# Patient Record
Sex: FEMALE | Race: WHITE | Employment: OTHER | URBAN - METROPOLITAN AREA
[De-identification: names, ages, dates, MRNs, and addresses within clinical notes are randomized per-mention and may not be internally consistent; named-entity substitution may affect disease eponyms.]

---

## 2017-01-23 DIAGNOSIS — F32.0 MAJOR DEPRESSIVE DISORDER, SINGLE EPISODE, MILD (H): Primary | ICD-10-CM

## 2017-01-23 DIAGNOSIS — F32.0 MAJOR DEPRESSIVE DISORDER, SINGLE EPISODE, MILD (H): ICD-10-CM

## 2017-01-23 NOTE — TELEPHONE ENCOUNTER
Fluphenazine    Last Written Prescription Date: 12/22/16  Last Fill Quantity: 15,  # refills: 0   Last Office Visit with [facility], [facility] or [facility] prescribing provider: 01/28/16

## 2017-01-24 RX ORDER — FLUPHENAZINE HYDROCHLORIDE 1 MG/1
0.5 TABLET ORAL DAILY
Qty: 15 TABLET | Refills: 0 | Status: ON HOLD | OUTPATIENT
Start: 2017-01-24 | End: 2019-12-31

## 2017-02-15 ENCOUNTER — ALLIED HEALTH/NURSE VISIT (OUTPATIENT)
Dept: CARDIOLOGY | Facility: CLINIC | Age: 82
End: 2017-02-15
Attending: INTERNAL MEDICINE
Payer: MEDICARE

## 2017-02-15 DIAGNOSIS — I44.2 CHB (COMPLETE HEART BLOCK) (H): Primary | ICD-10-CM

## 2017-02-15 PROCEDURE — 93294 REM INTERROG EVL PM/LDLS PM: CPT | Performed by: INTERNAL MEDICINE

## 2017-02-15 PROCEDURE — 93296 REM INTERROG EVL PM/IDS: CPT | Mod: ZF

## 2017-02-15 NOTE — MR AVS SNAPSHOT
"              After Visit Summary   2/15/2017    Michaela Soria    MRN: [id number]           Patient Information     Date Of Birth          1927        Visit Information        Provider Department      2/15/2017 6:00 AM  ICD [facility]        Today's Diagnoses     CHB (complete heart block) (H)    -  1       Follow-ups after your visit        Who to contact     If you have questions or need follow up information about today's clinic visit or your schedule please contact [facility] directly at [phone].  Normal or non-critical lab and imaging results will be communicated to you by CircleUphart, letter or phone within 4 business days after the clinic has received the results. If you do not hear from us within 7 days, please contact the clinic through CircleUphart or phone. If you have a critical or abnormal lab result, we will notify you by phone as soon as possible.  Submit refill requests through Fiberstar or call your pharmacy and they will forward the refill request to us. Please allow 3 business days for your refill to be completed.          Additional Information About Your Visit        MyChart Information     Fiberstar lets you send messages to your doctor, view your test results, renew your prescriptions, schedule appointments and more. To sign up, go to [email or web address] . Click on \"Log in\" on the left side of the screen, which will take you to the Welcome page. Then click on \"Sign up Now\" on the right side of the page.     You will be asked to enter the access code listed below, as well as some personal information. Please follow the directions to create your username and password.     Your access code is: [email or web address]  Expires: 2017  3:23 PM     Your access code will  in 90 days. If you need help or a new code, please call your [place] clinic or [phone].        Care EveryWhere ID     This is your Care EveryWhere ID. This could be used by other organizations to " access your [facility] [employer] medical records  [id number]         Blood Pressure from Last 3 Encounters:   09/16/16 99/60   03/08/16 142/72   02/09/16 100/47    Weight from Last 3 Encounters:   09/16/16 63.3 kg (139 lb 8 oz)   03/08/16 61.7 kg (136 lb)   01/28/16 61.7 kg (136 lb)              We Performed the Following     INTERROGATION DEVICE EVAL REMOTE, PACER/ICD        Primary Care Provider Office Phone # Fax #    Lulufilipekiah Read [phone]       [facility] [address] [address]        Thank you!     Thank you for choosing [facility]  for your care. Our goal is always to provide you with excellent care. Hearing back from our patients is one way we can continue to improve our services. Please take a few minutes to complete the written survey that you may receive in the mail after your visit with us. Thank you!             Your Updated Medication List - Protect others around you: Learn how to safely use, store and throw away your medicines at www.disposemymeds.org.          This list is accurate as of: 2/15/17 11:59 PM.  Always use your most recent med list.                   Brand Name Dispense Instructions for use    acetaminophen 650 MG 8 hour tablet     100 tablet    Take 650 mg by mouth every 4 hours as needed for mild pain       ampicillin 500 MG capsule    PRINCIPEN    21 capsule    Take 1 capsule (500 mg) by mouth 3 times daily       atenolol 25 MG tablet    TENORMIN    90 tablet    Take 1 tablet (25 mg) by mouth daily       atorvastatin 20 MG tablet    LIPITOR    90 tablet    Take 1 tablet (20 mg) by mouth daily (Needs fasting lab work)       benzonatate 200 MG capsule    TESSALON    21 capsule    Take 1 capsule (200 mg) by mouth 3 times daily as needed for cough       blood glucose monitoring test strip    no brand specified    3 Month    1 strip by In Vitro route 3 times daily       calcium carbonate 500 MG chewable tablet    TUMS     Take 1  chew tab by mouth 4 times daily as needed for heartburn       chlordiazePOXIDE 10 MG capsule    LIBRIUM    90 capsule    Take 1 capsule (10 mg) by mouth 3 times daily Needs to see PCP for further refills       docusate calcium 240 MG capsule    STOOL SOFTENER    90 capsule    Take 1 capsule (240 mg) by mouth daily IN AM       fluorometholone 0.1 % ophthalmic susp    FML LIQUIFILM     Place 1 drop into the right eye 3 times daily       fluPHENAZine 1 MG tablet    PROLIXIN    15 tablet    Take 0.5 tablets (0.5 mg) by mouth daily (Needs follow-up appointment for this medication)       furosemide 40 MG tablet    LASIX    90 tablet    Take 1 tablet (40 mg) by mouth daily       glipiZIDE 10 MG 24 hr tablet    GLUCOTROL XL    90 tablet    Take 1 tablet (10 mg) by mouth daily (Needs fasting lab work)       hydrocortisone 2.5 % cream    ANUSOL-HC     Place rectally 4 times daily as needed for hemorrhoids       lisinopril 10 MG tablet    PRINIVIL/ZESTRIL    90 tablet    Take 1 tablet (10 mg) by mouth daily       metFORMIN 500 MG 24 hr tablet    GLUCOPHAGE-XR    180 tablet    Take 1 tablet (500 mg) by mouth 2 times daily (with meals) (Needs fasting lab work)       polyethylene glycol 0.4%- propylene glycol 0.3% 0.4-0.3 % Soln ophthalmic solution    SYSTANE ULTRA     Place 1 drop into the right eye every 3 hours       potassium chloride 10 MEQ tablet    K-TAB,KLOR-CON    90 tablet    Take 1 tablet (10 mEq) by mouth daily       tamsulosin 0.4 MG capsule    FLOMAX    30 capsule    Take 1 capsule (0.4 mg) by mouth At Bedtime       timolol 0.5 % ophthalmic solution    TIMOPTIC    3 Bottle    Place 1 drop into both eyes daily       TUCKS 50 % Pads      Externally apply 1 pad topically 6 times daily

## 2017-02-16 NOTE — PROGRESS NOTES
Remote pacemaker transmission received and reviewed.  Device transmission sent per MD orders.  Patient has a Medtronic dual lead pacemaker.  Normal pacemaker function.  No arrythmias recorded.  Presenting EGM = AS- @ 68 bpm.  AP = 28.8%.   = 93.6%. Estimated battery longevity to ANGELLA = 6 years.  Patient's daughter notified of interrogation results.  Patient's daughter reports that her mother is feeling well and denies specific complaints.  Plan for patient to send a remote transmission in 3 months as scheduled.    Remote pacemaker transmission

## 2017-06-26 ENCOUNTER — ALLIED HEALTH/NURSE VISIT (OUTPATIENT)
Dept: CARDIOLOGY | Facility: CLINIC | Age: 82
End: 2017-06-26
Attending: INTERNAL MEDICINE
Payer: MEDICARE

## 2017-06-26 DIAGNOSIS — I44.2 CHB (COMPLETE HEART BLOCK) (H): Primary | ICD-10-CM

## 2017-06-26 PROCEDURE — 93295 DEV INTERROG REMOTE 1/2/MLT: CPT | Performed by: INTERNAL MEDICINE

## 2017-06-26 PROCEDURE — 93280 PM DEVICE PROGR EVAL DUAL: CPT | Mod: ZF

## 2017-06-26 PROCEDURE — 93296 REM INTERROG EVL PM/IDS: CPT | Mod: ZF

## 2017-06-26 NOTE — MR AVS SNAPSHOT
"              After Visit Summary   2017    Michaela Soria    MRN: [id number]           Patient Information     Date Of Birth          1927        Visit Information        Provider Department      2017 6:00 AM  ICD [facility]        Today's Diagnoses     CHB (complete heart block) (H)    -  1       Follow-ups after your visit        Who to contact     If you have questions or need follow up information about today's clinic visit or your schedule please contact [facility] directly at [phone].  Normal or non-critical lab and imaging results will be communicated to you by BioPolyhart, letter or phone within 4 business days after the clinic has received the results. If you do not hear from us within 7 days, please contact the clinic through BioPolyhart or phone. If you have a critical or abnormal lab result, we will notify you by phone as soon as possible.  Submit refill requests through Runtastic or call your pharmacy and they will forward the refill request to us. Please allow 3 business days for your refill to be completed.          Additional Information About Your Visit        MyChart Information     Runtastic lets you send messages to your doctor, view your test results, renew your prescriptions, schedule appointments and more. To sign up, go to [email or web address] . Click on \"Log in\" on the left side of the screen, which will take you to the Welcome page. Then click on \"Sign up Now\" on the right side of the page.     You will be asked to enter the access code listed below, as well as some personal information. Please follow the directions to create your username and password.     Your access code is: [email or web address]  Expires: 2017  9:59 AM     Your access code will  in 90 days. If you need help or a new code, please call your [place] clinic or [phone].        Care EveryWhere ID     This is your Care EveryWhere ID. This could be used by other organizations to " access your [facility] medical records  [id number]         Blood Pressure from Last 3 Encounters:   09/16/16 99/60   03/08/16 142/72   02/09/16 100/47    Weight from Last 3 Encounters:   09/16/16 63.3 kg (139 lb 8 oz)   03/08/16 61.7 kg (136 lb)   01/28/16 61.7 kg (136 lb)              We Performed the Following     INTERROGATION DEVICE EVAL REMOTE, PACER/ICD     PM DEVICE PROGRAMMING EVAL, DUAL LEAD PACER        Primary Care Provider Office Phone # Fax #    Do Josh Read [phone]       [facility] [address] [address]        Equal Access to Services     AMANDEEP ORTIZ : Hadii aad ku hadasho Soomaali, waaxda luqadaha, qaybta kaalmada adeegyada, waxay pilar lisa. So [facility] [phone].    ATENCIÓN: Si habla español, tiene a bingham disposición servicios gratuitos de asistencia lingüística. [facility] [phone].    We comply with applicable federal civil rights laws and [place] laws. We do not discriminate on the basis of race, color, national origin, age, disability sex, sexual orientation or gender identity.            Thank you!     Thank you for choosing [facility]  for your care. Our goal is always to provide you with excellent care. Hearing back from our patients is one way we can continue to improve our services. Please take a few minutes to complete the written survey that you may receive in the mail after your visit with us. Thank you!             Your Updated Medication List - Protect others around you: Learn how to safely use, store and throw away your medicines at www.disposemymeds.org.          This list is accurate as of: 6/26/17 11:59 PM.  Always use your most recent med list.                   Brand Name Dispense Instructions for use Diagnosis    acetaminophen 650 MG 8 hour tablet     100 tablet    Take 650 mg by mouth every 4 hours as needed for mild pain    Pneumonia of right lower lobe due to infectious  organism (H)       ampicillin 500 MG capsule    PRINCIPEN    21 capsule    Take 1 capsule (500 mg) by mouth 3 times daily    Personal history of urinary tract infection       atenolol 25 MG tablet    TENORMIN    90 tablet    Take 1 tablet (25 mg) by mouth daily    Essential hypertension, benign       atorvastatin 20 MG tablet    LIPITOR    90 tablet    Take 1 tablet (20 mg) by mouth daily (Needs fasting lab work)    Hyperlipidemia LDL goal <100       benzonatate 200 MG capsule    TESSALON    21 capsule    Take 1 capsule (200 mg) by mouth 3 times daily as needed for cough    Pneumonia, organism unspecified, unspecified laterality, unspecified part of lung       blood glucose monitoring test strip    no brand specified    3 Month    1 strip by In Vitro route 3 times daily    Type 2 diabetes, HbA1C goal < 8% (H)       calcium carbonate 500 MG chewable tablet    TUMS     Take 1 chew tab by mouth 4 times daily as needed for heartburn        chlordiazePOXIDE 10 MG capsule    LIBRIUM    90 capsule    Take 1 capsule (10 mg) by mouth 3 times daily Needs to see PCP for further refills    NISHI (generalized anxiety disorder)       docusate calcium 240 MG capsule    STOOL SOFTENER    90 capsule    Take 1 capsule (240 mg) by mouth daily IN AM    Congestive heart failure, unspecified, AV block, 2nd degree       fluorometholone 0.1 % ophthalmic susp    FML LIQUIFILM     Place 1 drop into the right eye 3 times daily        fluPHENAZine 1 MG tablet    PROLIXIN    15 tablet    Take 0.5 tablets (0.5 mg) by mouth daily (Needs follow-up appointment for this medication)    Major depressive disorder, single episode, mild (H), Major depressive disorder, single episode, mild (H)       furosemide 40 MG tablet    LASIX    90 tablet    Take 1 tablet (40 mg) by mouth daily    Essential hypertension, benign       glipiZIDE 10 MG 24 hr tablet    GLUCOTROL XL    90 tablet    Take 1 tablet (10 mg) by mouth daily (Needs fasting lab work)    Type 2  diabetes mellitus without complication (H)       hydrocortisone 2.5 % cream    ANUSOL-HC     Place rectally 4 times daily as needed for hemorrhoids        lisinopril 10 MG tablet    PRINIVIL/ZESTRIL    90 tablet    Take 1 tablet (10 mg) by mouth daily    Essential hypertension, benign       metFORMIN 500 MG 24 hr tablet    GLUCOPHAGE-XR    180 tablet    Take 1 tablet (500 mg) by mouth 2 times daily (with meals) (Needs fasting lab work)    Type 2 diabetes mellitus without complication (H)       polyethylene glycol 0.4%- propylene glycol 0.3% 0.4-0.3 % Soln ophthalmic solution    SYSTANE ULTRA     Place 1 drop into the right eye every 3 hours        potassium chloride 10 MEQ tablet    K-TAB,KLOR-CON    90 tablet    Take 1 tablet (10 mEq) by mouth daily    Essential hypertension, benign       tamsulosin 0.4 MG capsule    FLOMAX    30 capsule    Take 1 capsule (0.4 mg) by mouth At Bedtime    Urinary retention       timolol 0.5 % ophthalmic solution    TIMOPTIC    3 Bottle    Place 1 drop into both eyes daily    Glaucoma       TUCKS 50 % Pads      Externally apply 1 pad topically 6 times daily

## 2017-06-27 NOTE — PROGRESS NOTES
Scheduled Medtronic Dual Lead remote transmission received and reviewed. Device transmission sent per MD orders. Her presenting rhythm is AS/ @ 68 BPM. 0 episodes recorded. Normal device function. AP= 31.6% and = 99.6%. 0 short V-V intervals recorded. Lead trends appear stable. Battery estimates 5.5 years to ANGELLA. Pts daughter (Tessa) notified of transmission results. Plan for pt to RTC in September with Dr Smith and Device.     Pacemaker Dual Lead Remote

## 2017-08-13 ENCOUNTER — HOSPITAL ENCOUNTER (EMERGENCY)
Facility: CLINIC | Age: 82
Discharge: HOME | End: 2017-08-13
Attending: FAMILY MEDICINE | Admitting: FAMILY MEDICINE
Payer: MEDICARE

## 2017-08-13 ENCOUNTER — APPOINTMENT (OUTPATIENT)
Dept: GENERAL RADIOLOGY | Facility: CLINIC | Age: 82
End: 2017-08-13
Attending: EMERGENCY MEDICINE
Payer: MEDICARE

## 2017-08-13 VITALS
OXYGEN SATURATION: 99 % | TEMPERATURE: 98.6 F | WEIGHT: 138 LBS | RESPIRATION RATE: 16 BRPM | DIASTOLIC BLOOD PRESSURE: 53 MMHG | SYSTOLIC BLOOD PRESSURE: 127 MMHG | HEART RATE: 83 BPM | BODY MASS INDEX: 27.87 KG/M2

## 2017-08-13 DIAGNOSIS — J06.9 UPPER RESPIRATORY TRACT INFECTION, UNSPECIFIED TYPE: ICD-10-CM

## 2017-08-13 DIAGNOSIS — R05.9 COUGH: ICD-10-CM

## 2017-08-13 LAB
ALBUMIN SERPL-MCNC: 3.4 G/DL (ref 3.4–5)
ALP SERPL-CCNC: 111 U/L (ref 40–150)
ALT SERPL W P-5'-P-CCNC: 34 U/L (ref 0–50)
ANION GAP SERPL CALCULATED.3IONS-SCNC: 7 MMOL/L (ref 3–14)
AST SERPL W P-5'-P-CCNC: 25 U/L (ref 0–45)
BASOPHILS # BLD AUTO: 0 10E9/L (ref 0–0.2)
BASOPHILS NFR BLD AUTO: 0.5 %
BILIRUB SERPL-MCNC: 0.3 MG/DL (ref 0.2–1.3)
BUN SERPL-MCNC: 25 MG/DL (ref 7–30)
CALCIUM SERPL-MCNC: 9.3 MG/DL (ref 8.5–10.1)
CHLORIDE SERPL-SCNC: 108 MMOL/L (ref 94–109)
CO2 SERPL-SCNC: 26 MMOL/L (ref 20–32)
CREAT SERPL-MCNC: 1.32 MG/DL (ref 0.52–1.04)
DIFFERENTIAL METHOD BLD: ABNORMAL
EOSINOPHIL # BLD AUTO: 0.2 10E9/L (ref 0–0.7)
EOSINOPHIL NFR BLD AUTO: 2.8 %
ERYTHROCYTE [DISTWIDTH] IN BLOOD BY AUTOMATED COUNT: 16.2 % (ref 10–15)
GFR SERPL CREATININE-BSD FRML MDRD: 38 ML/MIN/1.7M2
GLUCOSE SERPL-MCNC: 177 MG/DL (ref 70–99)
HCT VFR BLD AUTO: 35.9 % (ref 35–47)
HGB BLD-MCNC: 11.1 G/DL (ref 11.7–15.7)
IMM GRANULOCYTES # BLD: 0 10E9/L (ref 0–0.4)
IMM GRANULOCYTES NFR BLD: 0.2 %
LYMPHOCYTES # BLD AUTO: 1 10E9/L (ref 0.8–5.3)
LYMPHOCYTES NFR BLD AUTO: 15.4 %
MCH RBC QN AUTO: 25.1 PG (ref 26.5–33)
MCHC RBC AUTO-ENTMCNC: 30.9 G/DL (ref 31.5–36.5)
MCV RBC AUTO: 81 FL (ref 78–100)
MONOCYTES # BLD AUTO: 0.9 10E9/L (ref 0–1.3)
MONOCYTES NFR BLD AUTO: 13.9 %
NEUTROPHILS # BLD AUTO: 4.3 10E9/L (ref 1.6–8.3)
NEUTROPHILS NFR BLD AUTO: 67.2 %
PLATELET # BLD AUTO: 189 10E9/L (ref 150–450)
POTASSIUM SERPL-SCNC: 4.5 MMOL/L (ref 3.4–5.3)
PROT SERPL-MCNC: 6.7 G/DL (ref 6.8–8.8)
RBC # BLD AUTO: 4.43 10E12/L (ref 3.8–5.2)
SODIUM SERPL-SCNC: 141 MMOL/L (ref 133–144)
WBC # BLD AUTO: 6.4 10E9/L (ref 4–11)

## 2017-08-13 PROCEDURE — 99284 EMERGENCY DEPT VISIT MOD MDM: CPT | Mod: 25

## 2017-08-13 PROCEDURE — 25000125 ZZHC RX 250: Performed by: EMERGENCY MEDICINE

## 2017-08-13 PROCEDURE — 71020 XR CHEST 2 VW: CPT

## 2017-08-13 PROCEDURE — 99284 EMERGENCY DEPT VISIT MOD MDM: CPT | Mod: Z6 | Performed by: EMERGENCY MEDICINE

## 2017-08-13 PROCEDURE — 94640 AIRWAY INHALATION TREATMENT: CPT

## 2017-08-13 PROCEDURE — 85025 COMPLETE CBC W/AUTO DIFF WBC: CPT | Performed by: EMERGENCY MEDICINE

## 2017-08-13 PROCEDURE — 96360 HYDRATION IV INFUSION INIT: CPT

## 2017-08-13 PROCEDURE — 96361 HYDRATE IV INFUSION ADD-ON: CPT

## 2017-08-13 PROCEDURE — 80053 COMPREHEN METABOLIC PANEL: CPT | Performed by: EMERGENCY MEDICINE

## 2017-08-13 PROCEDURE — 25000128 H RX IP 250 OP 636: Performed by: EMERGENCY MEDICINE

## 2017-08-13 RX ORDER — METFORMIN HCL 500 MG
1000 TABLET, EXTENDED RELEASE 24 HR ORAL 2 TIMES DAILY WITH MEALS
COMMUNITY
End: 2019-04-25 | Stop reason: DRUGHIGH

## 2017-08-13 RX ORDER — DOXYCYCLINE HYCLATE 100 MG
100 TABLET ORAL 2 TIMES DAILY
Qty: 14 TABLET | Refills: 0 | Status: SHIPPED | OUTPATIENT
Start: 2017-08-13 | End: 2018-05-25

## 2017-08-13 RX ORDER — IPRATROPIUM BROMIDE AND ALBUTEROL SULFATE 2.5; .5 MG/3ML; MG/3ML
3 SOLUTION RESPIRATORY (INHALATION) ONCE
Status: COMPLETED | OUTPATIENT
Start: 2017-08-13 | End: 2017-08-13

## 2017-08-13 RX ORDER — GUAIFENESIN/DEXTROMETHORPHAN 100-10MG/5
5 SYRUP ORAL EVERY 4 HOURS PRN
Qty: 560 ML | Refills: 0 | Status: SHIPPED | OUTPATIENT
Start: 2017-08-13 | End: 2018-05-25

## 2017-08-13 RX ADMIN — IPRATROPIUM BROMIDE AND ALBUTEROL SULFATE 3 ML: .5; 3 SOLUTION RESPIRATORY (INHALATION) at 12:38

## 2017-08-13 RX ADMIN — SODIUM CHLORIDE 500 ML: 9 INJECTION, SOLUTION INTRAVENOUS at 12:56

## 2017-08-13 ASSESSMENT — ENCOUNTER SYMPTOMS
SHORTNESS OF BREATH: 1
MUSCULOSKELETAL NEGATIVE: 1
HEMATOLOGIC/LYMPHATIC NEGATIVE: 1
PSYCHIATRIC NEGATIVE: 1
CONSTITUTIONAL NEGATIVE: 1
DIARRHEA: 1
NEUROLOGICAL NEGATIVE: 1
ENDOCRINE NEGATIVE: 1
ALLERGIC/IMMUNOLOGIC NEGATIVE: 1
COUGH: 1

## 2017-08-13 NOTE — ED AVS SNAPSHOT
[facility] Emergency Department    [address] [address]    Phone:  [phone]    Fax:  [phone]                                       Michaela Soria   MRN: [id number]    Department:  [facility] Emergency Department   Date of Visit:  8/13/2017           After Visit Summary Signature Page     I have received my discharge instructions, and my questions have been answered. I have discussed any challenges I see with this plan with the nurse or doctor.    ..........................................................................................................................................  Patient/Patient Representative Signature      ..........................................................................................................................................  Patient Representative Print Name and Relationship to Patient    ..................................................               ................................................  Date                                            Time    ..........................................................................................................................................  Reviewed by Signature/Title    ...................................................              ..............................................  Date                                                            Time

## 2017-08-13 NOTE — ED PROVIDER NOTES
"  History     Chief Complaint   Patient presents with     Cough     cough congestion and cold former smoker quit 18 years ago.      Diarrhea     HPI  Michaela Soria is a 89 year old female with a past medical history of pneumonia, CHF, hypertension, diverticulosis, hyperlipidemia, type II diabetes mellitus who presents to the ED with her son-in-law for the evaluation of cough and shortness of breath. Patient states her breath has been \"rattling around\" in her chest. Per son-in-law, patient has a history of pneumonia, most recently spring of 2015. He suspects pneumonia. Of note, patient lives with her son-in-law and her daughter and is [facility]. Her son-in-law has the same symptoms; he suspects she gave him the illness. She has a cardiac pacemaker. Current medications include atenolol, atorvastatin, metformin, furosemide, glipizide, tamsulosin, lisinopril, docusate.     Social History: Lives with and is cared for by son-in-law and daughter. From [place]. Presents with son-in-law via private vehicle.      Past Medical History:  Past Medical History:   Diagnosis Date     Congestive heart failure, unspecified      Diabetes mellitus (H)      Hypertension      Malignant neoplasm (H)     Breast       Medications:  Current Outpatient Prescriptions   Medication Sig Dispense Refill     GLIPIZIDE PO Take 5 mg by mouth 2 times daily       metFORMIN (GLUCOPHAGE-XR) 500 MG 24 hr tablet Take 1,000 mg by mouth 2 times daily (with meals)       guaiFENesin-dextromethorphan (ROBITUSSIN DM) 100-10 MG/5ML syrup Take 5 mLs by mouth every 4 hours as needed for cough 560 mL 0     doxycycline (VIBRA-TABS) 100 MG tablet Take 1 tablet (100 mg) by mouth 2 times daily 14 tablet 0     fluPHENAZine (PROLIXIN) 1 MG tablet Take 0.5 tablets (0.5 mg) by mouth daily (Needs follow-up appointment for this medication) 15 tablet 0     chlordiazePOXIDE (LIBRIUM) 10 MG capsule Take 1 capsule (10 mg) by mouth 3 times daily Needs to see PCP for further refills " 90 capsule 0     atenolol (TENORMIN) 25 MG tablet Take 1 tablet (25 mg) by mouth daily 90 tablet 0     atorvastatin (LIPITOR) 20 MG tablet Take 1 tablet (20 mg) by mouth daily (Needs fasting lab work) 90 tablet 1     furosemide (LASIX) 40 MG tablet Take 1 tablet (40 mg) by mouth daily 90 tablet 0     potassium chloride (K-TAB,KLOR-CON) 10 MEQ tablet Take 1 tablet (10 mEq) by mouth daily 90 tablet 3     timolol (TIMOPTIC) 0.5 % ophthalmic solution Place 1 drop into both eyes daily 3 Bottle 3     lisinopril (PRINIVIL,ZESTRIL) 10 MG tablet Take 1 tablet (10 mg) by mouth daily 90 tablet 3     [DISCONTINUED] glipiZIDE (GLUCOTROL XL) 10 MG 24 hr tablet Take 1 tablet (10 mg) by mouth daily (Needs fasting lab work) 90 tablet 0     hydrocortisone (ANUSOL-HC) 2.5 % rectal cream Place rectally 4 times daily as needed for hemorrhoids       Witch Hazel (TUCKS) 50 % PADS Externally apply 1 pad topically 6 times daily       calcium carbonate (TUMS) 500 MG chewable tablet Take 1 chew tab by mouth 4 times daily as needed for heartburn       polyethylene glycol 0.4%- propylene glycol 0.3% (SYSTANE ULTRA) 0.4-0.3 % SOLN ophthalmic solution Place 1 drop into the right eye every 3 hours       acetaminophen 650 MG TABS Take 650 mg by mouth every 4 hours as needed for mild pain 100 tablet      docusate calcium (STOOL SOFTENER) 240 MG capsule Take 1 capsule (240 mg) by mouth daily IN AM 90 capsule 3     blood glucose test strip 1 strip by In Vitro route 3 times daily 3 Month 2       Allergies:  Allergies   Allergen Reactions     Sulfa Drugs Nausea     mouth sores       Zomig [Zolmitriptan] Other (See Comments)     depression         I have reviewed the Medications, Allergies, Past Medical and Surgical History, and Social History in the Epic system.         Review of Systems   Constitutional: Negative.    HENT: Negative.    Respiratory: Positive for cough and shortness of breath.    Gastrointestinal: Positive for diarrhea.   Endocrine:  Negative.    Genitourinary: Negative.    Musculoskeletal: Negative.    Skin: Negative.    Allergic/Immunologic: Negative.    Neurological: Negative.    Hematological: Negative.    Psychiatric/Behavioral: Negative.        Physical Exam   BP: 128/72  Pulse: 83  Temp: 98.6  F (37  C)  Resp: 18  Weight: 62.6 kg (138 lb)  SpO2: 97 %  Physical Exam   Constitutional: She is oriented to person, place, and time. She appears well-developed and well-nourished. No distress.   HENT:   Head: Normocephalic and atraumatic.   Eyes: Conjunctivae and EOM are normal. Pupils are equal, round, and reactive to light. Right eye exhibits no discharge. Left eye exhibits no discharge. No scleral icterus.   Neck: Normal range of motion. Neck supple.   Pulmonary/Chest: Effort normal. She has rhonchi in the right middle field and the right lower field. She has rales in the right middle field.               Neurological: She is alert and oriented to person, place, and time.   Skin: No rash noted. She is not diaphoretic. No erythema. No pallor.   Psychiatric: She has a normal mood and affect. Her behavior is normal. Judgment and thought content normal.       ED Course     ED Course     Procedures             Critical Care time:  none                 ED medications:  Medications   0.9% sodium chloride BOLUS (500 mLs Intravenous New Bag 8/13/17 1256)   ipratropium - albuterol 0.5 mg/2.5 mg/3 mL (DUONEB) neb solution 3 mL (3 mLs Nebulization Given 8/13/17 1238)       ED labs and imaging:  Results for orders placed or performed during the hospital encounter of 08/13/17   Chest XR,  PA & LAT    Narrative    XR CHEST 2 VW 8/13/2017 1:18 PM    COMPARISON: 12/30/2015    HISTORY: Cough, history of pneumonia.      Impression    IMPRESSION: 2-lead implanted cardiac pacer over the left chest in  unchanged position. Cardiac silhouette within normal limits. No focal  airspace disease, pleural effusion or pneumothorax.    ALESSIO ESCOTO   CBC with platelets  differential   Result Value Ref Range    WBC 6.4 4.0 - 11.0 10e9/L    RBC Count 4.43 3.8 - 5.2 10e12/L    Hemoglobin 11.1 (L) 11.7 - 15.7 g/dL    Hematocrit 35.9 35.0 - 47.0 %    MCV 81 78 - 100 fl    MCH 25.1 (L) 26.5 - 33.0 pg    MCHC 30.9 (L) 31.5 - 36.5 g/dL    RDW 16.2 (H) 10.0 - 15.0 %    Platelet Count 189 150 - 450 10e9/L    Diff Method Automated Method     % Neutrophils 67.2 %    % Lymphocytes 15.4 %    % Monocytes 13.9 %    % Eosinophils 2.8 %    % Basophils 0.5 %    % Immature Granulocytes 0.2 %    Absolute Neutrophil 4.3 1.6 - 8.3 10e9/L    Absolute Lymphocytes 1.0 0.8 - 5.3 10e9/L    Absolute Monocytes 0.9 0.0 - 1.3 10e9/L    Absolute Eosinophils 0.2 0.0 - 0.7 10e9/L    Absolute Basophils 0.0 0.0 - 0.2 10e9/L    Abs Immature Granulocytes 0.0 0 - 0.4 10e9/L   Comprehensive metabolic panel   Result Value Ref Range    Sodium 141 133 - 144 mmol/L    Potassium 4.5 3.4 - 5.3 mmol/L    Chloride 108 94 - 109 mmol/L    Carbon Dioxide 26 20 - 32 mmol/L    Anion Gap 7 3 - 14 mmol/L    Glucose 177 (H) 70 - 99 mg/dL    Urea Nitrogen 25 7 - 30 mg/dL    Creatinine 1.32 (H) 0.52 - 1.04 mg/dL    GFR Estimate 38 (L) >60 mL/min/1.7m2    GFR Estimate If Black 46 (L) >60 mL/min/1.7m2    Calcium 9.3 8.5 - 10.1 mg/dL    Bilirubin Total 0.3 0.2 - 1.3 mg/dL    Albumin 3.4 3.4 - 5.0 g/dL    Protein Total 6.7 (L) 6.8 - 8.8 g/dL    Alkaline Phosphatase 111 40 - 150 U/L    ALT 34 0 - 50 U/L    AST 25 0 - 45 U/L         ED Vitals:  Vitals:    08/13/17 1330 08/13/17 1345 08/13/17 1400 08/13/17 1415   BP: 112/49 (!) 77/51 130/69 127/53   Pulse:       Resp: 17 (!) 0 22 16   Temp:       TempSrc:       SpO2: 99% 99% 98% 99%   Weight:         12:41 PM Patient assessed.    Assessments & Plan (with Medical Decision Making)   Clinical impression: 89-year-old female who presented with her son-in-law for concern for cough, shortness of breath and concern for possible pneumonia.  Son reports she's had pneumonia in the past most recently spring  of 2017.  On my exam she has coughing spells which sound wet.  She has some rales and rhonchi in the right chest.  She is 97% on room air normal blood pressure and afebrile.      ED Course and Plan:  Labs were obtained, x-ray of the chest was obtained as well as a dual neb.  She does live at home with her son-in-law.  She has normal labs today.  No white count.  Chest x-ray did not show any focal consolidation however given her age and report of history of pneumonia with patient, she has rattling in her chest she was discharged home with cough medication and also given doxycycline twice a day ×1 week.  I did review with her son-in-law who was comfortable taking her home that if she has worsening symptoms he may need to return for observation and admission for IV antibiotics. Family was comfortable taking the patient home.         Disclaimer: This note consists of symbols derived from keyboarding, dictation and/or voice recognition software. As a result, there may be errors in the script that have gone undetected. Please consider this when interpreting information found in this chart.  I have reviewed the nursing notes.    I have reviewed the findings, diagnosis, plan and need for follow up with the patient.       New Prescriptions    DOXYCYCLINE (VIBRA-TABS) 100 MG TABLET    Take 1 tablet (100 mg) by mouth 2 times daily    GUAIFENESIN-DEXTROMETHORPHAN (ROBITUSSIN DM) 100-10 MG/5ML SYRUP    Take 5 mLs by mouth every 4 hours as needed for cough       Final diagnoses:   Cough - history of history of pneumonia pneumonia   Upper respiratory tract infection, unspecified type     This document serves as a record of the services and decisions personally performed and made by Dustin Lew, *. The HPI was created on HIS/HER behalf by   Manuela Moya, a trained medical scribe. The creation of this document is based the provider's statements to the medical scribe.  Manuela Moya 12:41 PM 8/13/2017    Provider:   The  information in this document, created by the medical scribe for me, accurately reflects the services I personally performed and the decisions made by me. I have reviewed and approved this document for accuracy prior to leaving the patient care area.  Dustin Lew, * 12:41 PM 8/13/2017 8/13/2017   [facility] EMERGENCY DEPARTMENT     Dustin Lew MD  08/13/17 1432

## 2017-08-13 NOTE — ED AVS SNAPSHOT
[facility] Emergency Department    [address] [facility] [address]    Phone:  [phone]    Fax:  [phone]                                       Michaela Soria   MRN: [id number]    Department:  [facility] Emergency Department   Date of Visit:  8/13/2017           Patient Information     Date Of Birth          2/13/1927        Your diagnoses for this visit were:     Cough history of history of pneumonia pneumonia    Upper respiratory tract infection, unspecified type        You were seen by Jony Ivy MD and Dustin Lew MD.      Follow-up Information     Follow up with [facility] Emergency [facility].    Specialty:  EMERGENCY MEDICINE    Why:  As needed, If symptoms worsen    Contact information:    [address] [place] [address] [id number] [phone]    Additional information:    The medical center is located at   [address] [place] [address] (between [facility] and   [facility] in [place], [place] miles north   of [place]).        Follow up with Do Read MD In 3 days.    Specialty:  Family Practice    Why:  For recheck and follow-up    Contact information:    [address] [place] [phone]  [phone]        Discharge References/Attachments     DOXYCYCLINE HYCLATE ORAL TABLET (ENGLISH)      Future Appointments        Provider Department Dept Phone Center    10/9/2017 3:00 PM UC CV DEVICE 1              se [facility] [phone] [facility]    10/9/2017 3:20 PM Junaid Smith MD [facility] [phone] [facility]      24 Hour Appointment Hotline       To make an appointment at any [facility], call [phone] ([phone]). If you don't have a family doctor or clinic, we will help you find one. [facility] are conveniently located to serve the needs of you and your family.             Review of your medicines      START taking        Dose / Directions Last dose taken    doxycycline 100 MG tablet   Commonly known  as:  VIBRA-TABS   Dose:  100 mg   Quantity:  14 tablet        Take 1 tablet (100 mg) by mouth 2 times daily   Refills:  0        guaiFENesin-dextromethorphan 100-10 MG/5ML syrup   Commonly known as:  ROBITUSSIN DM   Dose:  5 mL   Quantity:  560 mL        Take 5 mLs by mouth every 4 hours as needed for cough   Refills:  0          Our records show that you are taking the medicines listed below. If these are incorrect, please call your family doctor or clinic.        Dose / Directions Last dose taken    acetaminophen 650 MG 8 hour tablet   Dose:  650 mg   Quantity:  100 tablet        Take 650 mg by mouth every 4 hours as needed for mild pain   Refills:  0        atenolol 25 MG tablet   Commonly known as:  TENORMIN   Dose:  25 mg   Quantity:  90 tablet        Take 1 tablet (25 mg) by mouth daily   Refills:  0        atorvastatin 20 MG tablet   Commonly known as:  LIPITOR   Dose:  20 mg   Quantity:  90 tablet        Take 1 tablet (20 mg) by mouth daily (Needs fasting lab work)   Refills:  1        blood glucose monitoring test strip   Commonly known as:  no brand specified   Dose:  1 strip   Quantity:  3 Month        1 strip by In Vitro route 3 times daily   Refills:  2        calcium carbonate 500 MG chewable tablet   Commonly known as:  TUMS   Dose:  1 chew tab        Take 1 chew tab by mouth 4 times daily as needed for heartburn   Refills:  0        chlordiazePOXIDE 10 MG capsule   Commonly known as:  LIBRIUM   Dose:  10 mg   Quantity:  90 capsule        Take 1 capsule (10 mg) by mouth 3 times daily Needs to see PCP for further refills   Refills:  0        docusate calcium 240 MG capsule   Commonly known as:  STOOL SOFTENER   Dose:  240 mg   Quantity:  90 capsule        Take 1 capsule (240 mg) by mouth daily IN AM   Refills:  3        fluPHENAZine 1 MG tablet   Commonly known as:  PROLIXIN   Dose:  0.5 mg   Quantity:  15 tablet        Take 0.5 tablets (0.5 mg) by mouth daily (Needs follow-up appointment for this  medication)   Refills:  0        furosemide 40 MG tablet   Commonly known as:  LASIX   Dose:  40 mg   Indication:  Congestive Heart Failure   Quantity:  90 tablet        Take 1 tablet (40 mg) by mouth daily   Refills:  0        GLIPIZIDE PO   Dose:  5 mg        Take 5 mg by mouth 2 times daily   Refills:  0        hydrocortisone 2.5 % cream   Commonly known as:  ANUSOL-HC        Place rectally 4 times daily as needed for hemorrhoids   Refills:  0        lisinopril 10 MG tablet   Commonly known as:  PRINIVIL/ZESTRIL   Dose:  10 mg   Quantity:  90 tablet        Take 1 tablet (10 mg) by mouth daily   Refills:  3        metFORMIN 500 MG 24 hr tablet   Commonly known as:  GLUCOPHAGE-XR   Dose:  1000 mg        Take 1,000 mg by mouth 2 times daily (with meals)   Refills:  0        polyethylene glycol 0.4%- propylene glycol 0.3% 0.4-0.3 % Soln ophthalmic solution   Commonly known as:  SYSTANE ULTRA   Dose:  1 drop        Place 1 drop into the right eye every 3 hours   Refills:  0        potassium chloride 10 MEQ tablet   Commonly known as:  K-TAB,KLOR-CON   Dose:  10 mEq   Quantity:  90 tablet        Take 1 tablet (10 mEq) by mouth daily   Refills:  3        timolol 0.5 % ophthalmic solution   Commonly known as:  TIMOPTIC   Dose:  1 drop   Quantity:  3 Bottle        Place 1 drop into both eyes daily   Refills:  3        TUCKS 50 % Pads   Dose:  1 pad        Externally apply 1 pad topically 6 times daily   Refills:  0                Prescriptions were sent or printed at these locations (2 Prescriptions)                   [address] [address]    Telephone:  [phone]   Fax:  [phone]   Hours:                  E-Prescribed (2 of 2)         guaiFENesin-dextromethorphan (ROBITUSSIN DM) 100-10 MG/5ML syrup               doxycycline (VIBRA-TABS) 100 MG tablet                Procedures and tests performed during your visit     CBC with platelets  differential    Chest XR,  PA & LAT    Comprehensive metabolic panel      Orders Needing Specimen Collection     None      Pending Results     No orders found from 8/11/2017 to 8/14/2017.            Pending Culture Results     No orders found from 8/11/2017 to 8/14/2017.            Pending Results Instructions     If you had any lab results that were not finalized at the time of your Discharge, you can call the ED Lab Result RN at [phone]. You will be contacted by this team for any positive Lab results or changes in treatment. The nurses are available 7 days a week from 10A to 6:30P.  You can leave a message 24 hours per day and they will return your call.        Test Results From Your Hospital Stay        8/13/2017  1:03 PM      Component Results     Component Value Ref Range & Units Status    WBC 6.4 4.0 - 11.0 10e9/L Final    RBC Count 4.43 3.8 - 5.2 10e12/L Final    Hemoglobin 11.1 (L) 11.7 - 15.7 g/dL Final    Hematocrit 35.9 35.0 - 47.0 % Final    MCV 81 78 - 100 fl Final    MCH 25.1 (L) 26.5 - 33.0 pg Final    MCHC 30.9 (L) 31.5 - 36.5 g/dL Final    RDW 16.2 (H) 10.0 - 15.0 % Final    Platelet Count 189 150 - 450 10e9/L Final    Diff Method Automated Method  Final    % Neutrophils 67.2 % Final    % Lymphocytes 15.4 % Final    % Monocytes 13.9 % Final    % Eosinophils 2.8 % Final    % Basophils 0.5 % Final    % Immature Granulocytes 0.2 % Final    Absolute Neutrophil 4.3 1.6 - 8.3 10e9/L Final    Absolute Lymphocytes 1.0 0.8 - 5.3 10e9/L Final    Absolute Monocytes 0.9 0.0 - 1.3 10e9/L Final    Absolute Eosinophils 0.2 0.0 - 0.7 10e9/L Final    Absolute Basophils 0.0 0.0 - 0.2 10e9/L Final    Abs Immature Granulocytes 0.0 0 - 0.4 10e9/L Final         8/13/2017  1:18 PM      Component Results     Component Value Ref Range & Units Status    Sodium 141 133 - 144 mmol/L Final    Potassium 4.5 3.4 - 5.3 mmol/L Final    Chloride 108 94 - 109 mmol/L Final    Carbon Dioxide 26 20 - 32 mmol/L Final    Anion Gap 7  "3 - 14 mmol/L Final    Glucose 177 (H) 70 - 99 mg/dL Final    Urea Nitrogen 25 7 - 30 mg/dL Final    Creatinine 1.32 (H) 0.52 - 1.04 mg/dL Final    GFR Estimate 38 (L) >60 mL/min/1.7m2 Final    Non  GFR Calc    GFR Estimate If Black 46 (L) >60 mL/min/1.7m2 Final    African American GFR Calc    Calcium 9.3 8.5 - 10.1 mg/dL Final    Bilirubin Total 0.3 0.2 - 1.3 mg/dL Final    Albumin 3.4 3.4 - 5.0 g/dL Final    Protein Total 6.7 (L) 6.8 - 8.8 g/dL Final    Alkaline Phosphatase 111 40 - 150 U/L Final    ALT 34 0 - 50 U/L Final    AST 25 0 - 45 U/L Final         2017  1:20 PM      Narrative     XR CHEST 2 VW 2017 1:18 PM    COMPARISON: 2015    HISTORY: Cough, history of pneumonia.        Impression     IMPRESSION: 2-lead implanted cardiac pacer over the left chest in  unchanged position. Cardiac silhouette within normal limits. No focal  airspace disease, pleural effusion or pneumothorax.    ALESSIO ESCOTO                Thank you for choosing [facility]       Thank you for choosing [facility] for your care. Our goal is always to provide you with excellent care. Hearing back from our patients is one way we can continue to improve our services. Please take a few minutes to complete the written survey that you may receive in the mail after you visit with us. Thank you!        [employer] Information     [employer] lets you send messages to your doctor, view your test results, renew your prescriptions, schedule appointments and more. To sign up, go to [email or web address] . Click on \"Log in\" on the left side of the screen, which will take you to the Welcome page. Then click on \"Sign up Now\" on the right side of the page.     You will be asked to enter the access code listed below, as well as some personal information. Please follow the directions to create your username and password.     Your access code is: [email or web address]  Expires: 2017  9:59 AM     Your access code will  in 90 days. If you " need help or a new code, please call your [facility] clinic or [phone].        Care EveryWhere ID     This is your Care EveryWhere ID. This could be used by other organizations to access your [facility] medical records  [id number]        Equal Access to Services     AMANDEEP ORTIZ : Marcos Vides, natalia jensen, autumn kaaltorrey pyle, akash lisa. So [facility] [phone].    ATENCIÓN: Si habla español, tiene a bingham disposición servicios gratuitos de asistencia lingüística. Llame al [phone].    We comply with applicable federal civil rights laws and [place] laws. We do not discriminate on the basis of race, color, national origin, age, disability sex, sexual orientation or gender identity.            After Visit Summary       This is your record. Keep this with you and show to your community pharmacist(s) and doctor(s) at your next visit.

## 2017-10-09 ENCOUNTER — OFFICE VISIT (OUTPATIENT)
Dept: CARDIOLOGY | Facility: CLINIC | Age: 82
End: 2017-10-09
Attending: INTERNAL MEDICINE
Payer: MEDICARE

## 2017-10-09 ENCOUNTER — PRE VISIT (OUTPATIENT)
Dept: CARDIOLOGY | Facility: CLINIC | Age: 82
End: 2017-10-09

## 2017-10-09 VITALS
WEIGHT: 130.4 LBS | DIASTOLIC BLOOD PRESSURE: 60 MMHG | HEART RATE: 71 BPM | HEIGHT: 59 IN | OXYGEN SATURATION: 97 % | SYSTOLIC BLOOD PRESSURE: 131 MMHG | BODY MASS INDEX: 26.29 KG/M2

## 2017-10-09 DIAGNOSIS — I44.2 CHB (COMPLETE HEART BLOCK) (H): Primary | ICD-10-CM

## 2017-10-09 DIAGNOSIS — I44.1 AV BLOCK, 2ND DEGREE: Primary | ICD-10-CM

## 2017-10-09 DIAGNOSIS — Z95.0 CARDIAC PACEMAKER IN SITU: ICD-10-CM

## 2017-10-09 PROCEDURE — 99212 OFFICE O/P EST SF 10 MIN: CPT | Mod: ZF

## 2017-10-09 PROCEDURE — 99214 OFFICE O/P EST MOD 30 MIN: CPT | Mod: 25 | Performed by: INTERNAL MEDICINE

## 2017-10-09 PROCEDURE — 93280 PM DEVICE PROGR EVAL DUAL: CPT | Mod: ZF

## 2017-10-09 PROCEDURE — 93280 PM DEVICE PROGR EVAL DUAL: CPT | Mod: 26 | Performed by: INTERNAL MEDICINE

## 2017-10-09 ASSESSMENT — PAIN SCALES - GENERAL: PAINLEVEL: NO PAIN (0)

## 2017-10-09 NOTE — LETTER
10/9/2017      RE: Michaela Soria  [address] [facility] [address]       Dear Colleague,    Thank you for the opportunity to participate in the care of your patient, Michaela Soria, at the [facility]. Please see a copy of my visit note below.    Cardiac Electrophysiology Clinic    Chief Complaint:  PPM for CHB, paroxysmal AF    HPI:  Ms Soria had a dual chamber PPM implanted on 3/17/2014 for complete heart block.  She reports feeling well since we last saw her one year ago.  She denies chest pain, dyspnea, lightheadedness, and syncope.  However, her daughter reports that her SBP has been as low as the 80s.  She has been on atenolol for hypertension.  Paroxysmal AF was diagnosed incidentally on a device check.  She had previously declined to take anticoagulation.  She is practically deaf and relies on an xena that converts speech to text in order to communicate.  She enjoys watching movies, reading on the computer, and knitting.    Device interrogation today showed 97.2% RV pacing, <1% AF, and estimated ANGELLA ~5 years.  HR histograms showed good HR variation.  No short V-V intervals were noted.      Current Outpatient Prescriptions on File Prior to Visit:  GLIPIZIDE PO Take 5 mg by mouth 2 times daily   metFORMIN (GLUCOPHAGE-XR) 500 MG 24 hr tablet Take 1,000 mg by mouth 2 times daily (with meals)   guaiFENesin-dextromethorphan (ROBITUSSIN DM) 100-10 MG/5ML syrup Take 5 mLs by mouth every 4 hours as needed for cough   doxycycline (VIBRA-TABS) 100 MG tablet Take 1 tablet (100 mg) by mouth 2 times daily   fluPHENAZine (PROLIXIN) 1 MG tablet Take 0.5 tablets (0.5 mg) by mouth daily (Needs follow-up appointment for this medication)   chlordiazePOXIDE (LIBRIUM) 10 MG capsule Take 1 capsule (10 mg) by mouth 3 times daily Needs to see PCP for further refills   atenolol (TENORMIN) 25 MG tablet Take 1 tablet (25 mg) by mouth daily   atorvastatin  "(LIPITOR) 20 MG tablet Take 1 tablet (20 mg) by mouth daily (Needs fasting lab work)   furosemide (LASIX) 40 MG tablet Take 1 tablet (40 mg) by mouth daily   potassium chloride (K-TAB,KLOR-CON) 10 MEQ tablet Take 1 tablet (10 mEq) by mouth daily   timolol (TIMOPTIC) 0.5 % ophthalmic solution Place 1 drop into both eyes daily   hydrocortisone (ANUSOL-HC) 2.5 % rectal cream Place rectally 4 times daily as needed for hemorrhoids   Witch Hazel (TUCKS) 50 % PADS Externally apply 1 pad topically 6 times daily   calcium carbonate (TUMS) 500 MG chewable tablet Take 1 chew tab by mouth 4 times daily as needed for heartburn   polyethylene glycol 0.4%- propylene glycol 0.3% (SYSTANE ULTRA) 0.4-0.3 % SOLN ophthalmic solution Place 1 drop into the right eye every 3 hours   acetaminophen 650 MG TABS Take 650 mg by mouth every 4 hours as needed for mild pain   lisinopril (PRINIVIL,ZESTRIL) 10 MG tablet Take 1 tablet (10 mg) by mouth daily   docusate calcium (STOOL SOFTENER) 240 MG capsule Take 1 capsule (240 mg) by mouth daily IN AM   blood glucose test strip 1 strip by In Vitro route 3 times daily     No current facility-administered medications on file prior to visit.      Allergies   Allergen Reactions     Sulfa Drugs Nausea     mouth sores       Zomig [Zolmitriptan] Other (See Comments)     depression       Past Medical History:   Diagnosis Date     Congestive heart failure, unspecified      Diabetes mellitus (H)      Hypertension      Malignant neoplasm (H)     Breast     ROS: uses a wheelchair    Physical Examination:  Vitals: /60 (BP Location: Right arm, Patient Position: Chair, Cuff Size: Adult Small)  Pulse 71  Ht 1.499 m (4' 11\")  Wt 59.1 kg (130 lb 6.4 oz)  SpO2 97%  BMI 26.34 kg/m2  GENERAL APPEARANCE:  Comfortable appearing, frail female with unlabored breathing.  HEENT:  Anicteric sclerae.  Conjugate gaze.  NECK:  JVP is not visible when seated upright.  CHEST:  lungs are clear on " auscultation  CARDIOVASCULAR:  RRR.  Heart sounds are barely audible over her breathing.  Pacemaker pocket is not tender or erythematous.  Extremities are warm.  Trace lower leg edema is present bilaterally.  NEURO:  Alert, answers questions appropriately.  Deaf.  SKIN:  Not jaundiced.  No petechiae/ecchymoses.    Labs, imaging & procedures were reviewed.  Recent Labs   Lab Test  08/13/17   1245   01/07/16   0743  01/03/16   0930  01/02/16   0500   03/17/14   1405   CR  1.32*   --   1.16*  1.10*  1.10*   < >  1.42*   TSH   --    --    --    --    --    --   3.79   HGB  11.1*   < >   --   9.6*   --    < >  13.0    < > = values in this interval not displayed.         Assessment and recommendations:  1) Complete heart block  2) S/P permanent pacemaker 3/2014  3) Paroxysmal atrial fibrillation, CHADSVasc 5 (htn, age, female, diabetes), previously declined anticoagulated  4) Low systolic BPs, reportedly as low as the 80s.  5) History of hypertension, treated with atenolol.    - Again reviewed the indication for anticoagulation for primary prevention of stroke due to her high CHADSVasc score but she feels strongly against taking yet another medication and is willing to accept the risk of stroke.  - discontinue atenolol.  - remote device interrogation every 3 months.  - return to clinic in 1 year for formal device interrogation and clinic visit.    I appreciate the chance to help with Ms Soria's care. Please let me know if you have any questions or concerns.    I discussed the patient with Dr. Junaid Smith..    Giovanny Perez MD  Cardiovascular disease fellow    Attending: Patient seen and examined with Dr. Perez. The history and physical findings are accurate as recorded. My additional findings, if any, have been incorporated into the body of the note. All labs, imaging studies, ECG and telemetry data have been reviewed personally. The assessment and plans outlined reflect our joint decision making.    Junaid Smith,  MD

## 2017-10-09 NOTE — MR AVS SNAPSHOT
After Visit Summary   10/9/2017    Michaela Soria    MRN: [id number]           Patient Information     Date Of Birth          2/13/1927        Visit Information        Provider Department      10/9/2017 3:20 PM Junaid Smith MD [facility]        Care Instructions    Return to clinic in one year with device check.     Please do not hesitate to call if you have any cardiology related questions or concerns, or need to schedule an appointment, at [phone].         Cardiology Medication Refill Request Information:  * Please contact your pharmacy regarding ANY request for medication refills.  ** [facility] Prescription Fax = [phone]  * Please allow 3 business days for routine medication refills.    Cardiology Test Result notification information:  *You will be notified with in 7-10 days of your appointment day regarding the results of your test. If you are on MyChart you will be notified as soon as the provider has reviewed the results and signed off on them. Please call RN Care Coordinator with questions regarding results.              Follow-ups after your visit        Follow-up notes from your care team     Discussed this visit Return in about 1 year (around 10/9/2018) for eric Smith HB, Routine Visit, device check .      Who to contact     If you have questions or need follow up information about today's clinic visit or your schedule please contact [facility] directly at [phone].  Normal or non-critical lab and imaging results will be communicated to you by MyChart, letter or phone within 4 business days after the clinic has received the results. If you do not hear from us within 7 days, please contact the clinic through MyChart or phone. If you have a critical or abnormal lab result, we will notify you by phone as soon as possible.  Submit refill requests through Networked [employer]isms or call your pharmacy and they will forward the refill request to us. Please allow 3 business  "days for your refill to be completed.          Additional Information About Your Visit        Knowlarity Communicationshart Information     AppsBuilder lets you send messages to your doctor, view your test results, renew your prescriptions, schedule appointments and more. To sign up, go to [email or web address] . Click on \"Log in\" on the left side of the screen, which will take you to the Welcome page. Then click on \"Sign up Now\" on the right side of the page.     You will be asked to enter the access code listed below, as well as some personal information. Please follow the directions to create your username and password.     Your access code is: [email or web address]  Expires: 2018  3:09 PM     Your access code will  in 90 days. If you need help or a new code, please call your [place] clinic or [phone].        Care EveryWhere ID     This is your Care EveryWhere ID. This could be used by other organizations to access your [place] medical records  [id number]        Your Vitals Were     Pulse Height Pulse Oximetry BMI (Body Mass Index)          71 1.499 m (4' 11\") 97% 26.34 kg/m2         Blood Pressure from Last 3 Encounters:   10/09/17 131/60   17 127/53   16 99/60    Weight from Last 3 Encounters:   10/09/17 59.1 kg (130 lb 6.4 oz)   17 62.6 kg (138 lb)   16 63.3 kg (139 lb 8 oz)              Today, you had the following     No orders found for display       Primary Care Provider Office Phone # Fax #    Ruddy Tabatha Whitten [phone]       [facility] [address] [address]        Equal Access to Services     AMANDEEP ORTIZ : Marcos Vides, natalia jensen, akash vazquez So [facility] [phone].    ATENCIÓN: Si habla español, tiene a bingham disposición servicios gratuitos de asistencia lingüística. Pool al [phone].    We comply with applicable federal civil rights laws and [place] " laws. We do not discriminate on the basis of race, color, national origin, age, disability, sex, sexual orientation, or gender identity.            Thank you!     Thank you for choosing [facility]  for your care. Our goal is always to provide you with excellent care. Hearing back from our patients is one way we can continue to improve our services. Please take a few minutes to complete the written survey that you may receive in the mail after your visit with us. Thank you!             Your Updated Medication List - Protect others around you: Learn how to safely use, store and throw away your medicines at www.disposemymeds.org.          This list is accurate as of: 10/9/17  4:03 PM.  Always use your most recent med list.                   Brand Name Dispense Instructions for use Diagnosis    acetaminophen 650 MG 8 hour tablet     100 tablet    Take 650 mg by mouth every 4 hours as needed for mild pain    Pneumonia of right lower lobe due to infectious organism (H)       atenolol 25 MG tablet    TENORMIN    90 tablet    Take 1 tablet (25 mg) by mouth daily    Essential hypertension, benign       atorvastatin 20 MG tablet    LIPITOR    90 tablet    Take 1 tablet (20 mg) by mouth daily (Needs fasting lab work)    Hyperlipidemia LDL goal <100       blood glucose monitoring test strip    no brand specified    3 Month    1 strip by In Vitro route 3 times daily    Type 2 diabetes, HbA1C goal < 8% (H)       calcium carbonate 500 MG chewable tablet    TUMS     Take 1 chew tab by mouth 4 times daily as needed for heartburn        chlordiazePOXIDE 10 MG capsule    LIBRIUM    90 capsule    Take 1 capsule (10 mg) by mouth 3 times daily Needs to see PCP for further refills    NISHI (generalized anxiety disorder)       docusate calcium 240 MG capsule    STOOL SOFTENER    90 capsule    Take 1 capsule (240 mg) by mouth daily IN AM    Congestive heart failure, unspecified, AV block, 2nd degree       doxycycline 100 MG tablet     VIBRA-TABS    14 tablet    Take 1 tablet (100 mg) by mouth 2 times daily        fluPHENAZine 1 MG tablet    PROLIXIN    15 tablet    Take 0.5 tablets (0.5 mg) by mouth daily (Needs follow-up appointment for this medication)    Major depressive disorder, single episode, mild (H), Major depressive disorder, single episode, mild (H)       furosemide 40 MG tablet    LASIX    90 tablet    Take 1 tablet (40 mg) by mouth daily    Essential hypertension, benign       GLIPIZIDE PO      Take 5 mg by mouth 2 times daily        guaiFENesin-dextromethorphan 100-10 MG/5ML syrup    ROBITUSSIN DM    560 mL    Take 5 mLs by mouth every 4 hours as needed for cough        hydrocortisone 2.5 % cream    ANUSOL-HC     Place rectally 4 times daily as needed for hemorrhoids        lisinopril 10 MG tablet    PRINIVIL/ZESTRIL    90 tablet    Take 1 tablet (10 mg) by mouth daily    Essential hypertension, benign       metFORMIN 500 MG 24 hr tablet    GLUCOPHAGE-XR     Take 1,000 mg by mouth 2 times daily (with meals)        polyethylene glycol 0.4%- propylene glycol 0.3% 0.4-0.3 % Soln ophthalmic solution    SYSTANE ULTRA     Place 1 drop into the right eye every 3 hours        potassium chloride 10 MEQ tablet    K-TAB,KLOR-CON    90 tablet    Take 1 tablet (10 mEq) by mouth daily    Essential hypertension, benign       timolol 0.5 % ophthalmic solution    TIMOPTIC    3 Bottle    Place 1 drop into both eyes daily    Glaucoma       TUCKS 50 % Pads      Externally apply 1 pad topically 6 times daily

## 2017-10-09 NOTE — PROGRESS NOTES
Pt seen in clinic for evaluation and iterative programming of her Medtronic dual chamber pacemaker per MD order.  She looks well and she states that she is doing fine and she does not offer any complaints.  Her pacemaker check does not show any episodes of atrial or ventricular arrhythmias.  She is RV pacing 97.2% of the time, her heart rate histograms show good heart rate variation and her pacemaker battery estimates 5 years left.  She has a routine follow up with Dr Smith today in clinic.  We will plan to do quarterly remote checks and see her back in clinic in 1 year.  Normal pacemaker function.    Dual pacemaker

## 2017-10-09 NOTE — NURSING NOTE
Chief Complaint   Patient presents with     Follow Up For     1 year follow up for complete heart block s/p PPM, atrial fibrillation      Vitals were taken and medications were reconciled.     RASHAD Milian  3:01 PM

## 2017-10-09 NOTE — MR AVS SNAPSHOT
After Visit Summary   10/9/2017    Michaela Soria    MRN: [id number]           Patient Information     Date Of Birth          2/13/1927        Visit Information        Provider Department      10/9/2017 3:00 PM 1,  Cv Device [facility]        Today's Diagnoses     AV block, 2nd degree    -  1      Care Instructions    It was a pleasure to see you in clinic today.  Please do not hesitate to call us if you have any questions or concerns.  Please return to clinic in 1 year.    Next remote transmissions are scheduled on 1/9/18, 4/11/18 and 7/12/18.    Nita Ballard R.N.  Electrophysiology nurse specialist  [facility]  [address] [address]     Flora Banda  [phone] business hours    After business hours please call [phone], option #4, and ask for Job code [phone].                  Follow-ups after your visit        Follow-up notes from your care team     Discussed this visit Return in about 1 year (around 10/9/2018) for Pacemaker check, Dr Smith.      Who to contact     If you have questions or need follow up information about today's clinic visit or your schedule please contact [facility] directly at [phone].  Normal or non-critical lab and imaging results will be communicated to you by ParaShoothart, letter or phone within 4 business days after the clinic has received the results. If you do not hear from us within 7 days, please contact the clinic through ParaShoothart or phone. If you have a critical or abnormal lab result, we will notify you by phone as soon as possible.  Submit refill requests through Cur[employer]Bird or call your pharmacy and they will forward the refill request to us. Please allow 3 business days for your refill to be completed.          Additional Information About Your Visit        ParaShootharShoutly Information     CurrencyBird lets you send messages to your doctor, view your test results, renew your prescriptions,  "schedule appointments and more. To sign up, go to [email or web address] . Click on \"Log in\" on the left side of the screen, which will take you to the Welcome page. Then click on \"Sign up Now\" on the right side of the page.     You will be asked to enter the access code listed below, as well as some personal information. Please follow the directions to create your username and password.     Your access code is: [email or web address]  Expires: 2018  3:09 PM     Your access code will  in 90 days. If you need help or a new code, please call your [place] clinic or [phone].        Care EveryWhere ID     This is your Care EveryWhere ID. This could be used by other organizations to access your [place] medical records  [id number]         Blood Pressure from Last 3 Encounters:   10/09/17 131/60   17 127/53   16 99/60    Weight from Last 3 Encounters:   10/09/17 59.1 kg (130 lb 6.4 oz)   17 62.6 kg (138 lb)   16 63.3 kg (139 lb 8 oz)              We Performed the Following     PM DEVICE PROGRAMMING EVAL, DUAL LEAD PACER        Primary Care Provider Office Phone # Fax #    Ruddy Whitten [phone]       [facility] [address] [address]        Equal Access to Services     [facility]: Hadii julio cesar Vides, waaxda luqadaha, qaybta kaalmada lashanda, akash lisa. So [facility] [phone].    ATENCIÓN: Si habla español, tiene a bingham disposición servicios gratuitos de asistencia lingüística. Llame al [phone].    We comply with applicable federal civil rights laws and [place] laws. We do not discriminate on the basis of race, color, national origin, age, disability, sex, sexual orientation, or gender identity.            Thank you!     Thank you for choosing [facility]  for your care. Our goal is always to provide you with excellent care. Hearing back from our patients is one way we " can continue to improve our services. Please take a few minutes to complete the written survey that you may receive in the mail after your visit with us. Thank you!             Your Updated Medication List - Protect others around you: Learn how to safely use, store and throw away your medicines at www.disposemymeds.org.          This list is accurate as of: 10/9/17  4:09 PM.  Always use your most recent med list.                   Brand Name Dispense Instructions for use Diagnosis    acetaminophen 650 MG 8 hour tablet     100 tablet    Take 650 mg by mouth every 4 hours as needed for mild pain    Pneumonia of right lower lobe due to infectious organism (H)       atenolol 25 MG tablet    TENORMIN    90 tablet    Take 1 tablet (25 mg) by mouth daily    Essential hypertension, benign       atorvastatin 20 MG tablet    LIPITOR    90 tablet    Take 1 tablet (20 mg) by mouth daily (Needs fasting lab work)    Hyperlipidemia LDL goal <100       blood glucose monitoring test strip    no brand specified    3 Month    1 strip by In Vitro route 3 times daily    Type 2 diabetes, HbA1C goal < 8% (H)       calcium carbonate 500 MG chewable tablet    TUMS     Take 1 chew tab by mouth 4 times daily as needed for heartburn        chlordiazePOXIDE 10 MG capsule    LIBRIUM    90 capsule    Take 1 capsule (10 mg) by mouth 3 times daily Needs to see PCP for further refills    NISHI (generalized anxiety disorder)       docusate calcium 240 MG capsule    STOOL SOFTENER    90 capsule    Take 1 capsule (240 mg) by mouth daily IN AM    Congestive heart failure, unspecified, AV block, 2nd degree       doxycycline 100 MG tablet    VIBRA-TABS    14 tablet    Take 1 tablet (100 mg) by mouth 2 times daily        fluPHENAZine 1 MG tablet    PROLIXIN    15 tablet    Take 0.5 tablets (0.5 mg) by mouth daily (Needs follow-up appointment for this medication)    Major depressive disorder, single episode, mild (H), Major depressive disorder, single  episode, mild (H)       furosemide 40 MG tablet    LASIX    90 tablet    Take 1 tablet (40 mg) by mouth daily    Essential hypertension, benign       GLIPIZIDE PO      Take 5 mg by mouth 2 times daily        guaiFENesin-dextromethorphan 100-10 MG/5ML syrup    ROBITUSSIN DM    560 mL    Take 5 mLs by mouth every 4 hours as needed for cough        hydrocortisone 2.5 % cream    ANUSOL-HC     Place rectally 4 times daily as needed for hemorrhoids        lisinopril 10 MG tablet    PRINIVIL/ZESTRIL    90 tablet    Take 1 tablet (10 mg) by mouth daily    Essential hypertension, benign       metFORMIN 500 MG 24 hr tablet    GLUCOPHAGE-XR     Take 1,000 mg by mouth 2 times daily (with meals)        polyethylene glycol 0.4%- propylene glycol 0.3% 0.4-0.3 % Soln ophthalmic solution    SYSTANE ULTRA     Place 1 drop into the right eye every 3 hours        potassium chloride 10 MEQ tablet    K-TAB,KLOR-CON    90 tablet    Take 1 tablet (10 mEq) by mouth daily    Essential hypertension, benign       timolol 0.5 % ophthalmic solution    TIMOPTIC    3 Bottle    Place 1 drop into both eyes daily    Glaucoma       TUCKS 50 % Pads      Externally apply 1 pad topically 6 times daily

## 2017-10-09 NOTE — NURSING NOTE
Chief Complaint   Patient presents with     Follow Up For     1 year follow up for complete heart block s/p PPM, atrial fibrillation      Vitals were taken and medications were reconciled.     Hua Farrell MA  3:08 PM

## 2017-10-09 NOTE — PROGRESS NOTES
Cardiac Electrophysiology Clinic    Chief Complaint:  PPM for CHB, paroxysmal AF    HPI:  Ms Soria had a dual chamber PPM implanted on 3/17/2014 for complete heart block.  She reports feeling well since we last saw her one year ago.  She denies chest pain, dyspnea, lightheadedness, and syncope.  However, her daughter reports that her SBP has been as low as the 80s.  She has been on atenolol for hypertension.  Paroxysmal AF was diagnosed incidentally on a device check.  She had previously declined to take anticoagulation.  She is practically deaf and relies on an xena that converts speech to text in order to communicate.  She enjoys watching movies, reading on the computer, and knitting.    Device interrogation today showed 97.2% RV pacing, <1% AF, and estimated ANGELLA ~5 years.  HR histograms showed good HR variation.  No short V-V intervals were noted.      Current Outpatient Prescriptions on File Prior to Visit:  GLIPIZIDE PO Take 5 mg by mouth 2 times daily   metFORMIN (GLUCOPHAGE-XR) 500 MG 24 hr tablet Take 1,000 mg by mouth 2 times daily (with meals)   guaiFENesin-dextromethorphan (ROBITUSSIN DM) 100-10 MG/5ML syrup Take 5 mLs by mouth every 4 hours as needed for cough   doxycycline (VIBRA-TABS) 100 MG tablet Take 1 tablet (100 mg) by mouth 2 times daily   fluPHENAZine (PROLIXIN) 1 MG tablet Take 0.5 tablets (0.5 mg) by mouth daily (Needs follow-up appointment for this medication)   chlordiazePOXIDE (LIBRIUM) 10 MG capsule Take 1 capsule (10 mg) by mouth 3 times daily Needs to see PCP for further refills   atenolol (TENORMIN) 25 MG tablet Take 1 tablet (25 mg) by mouth daily   atorvastatin (LIPITOR) 20 MG tablet Take 1 tablet (20 mg) by mouth daily (Needs fasting lab work)   furosemide (LASIX) 40 MG tablet Take 1 tablet (40 mg) by mouth daily   potassium chloride (K-TAB,KLOR-CON) 10 MEQ tablet Take 1 tablet (10 mEq) by mouth daily   timolol (TIMOPTIC) 0.5 % ophthalmic solution Place 1 drop into both eyes daily  "  hydrocortisone (ANUSOL-HC) 2.5 % rectal cream Place rectally 4 times daily as needed for hemorrhoids   Witch Hazel (TUCKS) 50 % PADS Externally apply 1 pad topically 6 times daily   calcium carbonate (TUMS) 500 MG chewable tablet Take 1 chew tab by mouth 4 times daily as needed for heartburn   polyethylene glycol 0.4%- propylene glycol 0.3% (SYSTANE ULTRA) 0.4-0.3 % SOLN ophthalmic solution Place 1 drop into the right eye every 3 hours   acetaminophen 650 MG TABS Take 650 mg by mouth every 4 hours as needed for mild pain   lisinopril (PRINIVIL,ZESTRIL) 10 MG tablet Take 1 tablet (10 mg) by mouth daily   docusate calcium (STOOL SOFTENER) 240 MG capsule Take 1 capsule (240 mg) by mouth daily IN AM   blood glucose test strip 1 strip by In Vitro route 3 times daily     No current facility-administered medications on file prior to visit.      Allergies   Allergen Reactions     Sulfa Drugs Nausea     mouth sores       Zomig [Zolmitriptan] Other (See Comments)     depression       Past Medical History:   Diagnosis Date     Congestive heart failure, unspecified      Diabetes mellitus (H)      Hypertension      Malignant neoplasm (H)     Breast     ROS: uses a wheelchair    Physical Examination:  Vitals: /60 (BP Location: Right arm, Patient Position: Chair, Cuff Size: Adult Small)  Pulse 71  Ht 1.499 m (4' 11\")  Wt 59.1 kg (130 lb 6.4 oz)  SpO2 97%  BMI 26.34 kg/m2  GENERAL APPEARANCE:  Comfortable appearing, frail female with unlabored breathing.  HEENT:  Anicteric sclerae.  Conjugate gaze.  NECK:  JVP is not visible when seated upright.  CHEST:  lungs are clear on auscultation  CARDIOVASCULAR:  RRR.  Heart sounds are barely audible over her breathing.  Pacemaker pocket is not tender or erythematous.  Extremities are warm.  Trace lower leg edema is present bilaterally.  NEURO:  Alert, answers questions appropriately.  Deaf.  SKIN:  Not jaundiced.  No petechiae/ecchymoses.    Labs, imaging & procedures were " reviewed.  Recent Labs   Lab Test  08/13/17   1245   01/07/16   0743  01/03/16   0930  01/02/16   0500   03/17/14   1405   CR  1.32*   --   1.16*  1.10*  1.10*   < >  1.42*   TSH   --    --    --    --    --    --   3.79   HGB  11.1*   < >   --   9.6*   --    < >  13.0    < > = values in this interval not displayed.         Assessment and recommendations:  1) Complete heart block  2) S/P permanent pacemaker 3/2014  3) Paroxysmal atrial fibrillation, CHADSVasc 5 (htn, age, female, diabetes), previously declined anticoagulated  4) Low systolic BPs, reportedly as low as the 80s.  5) History of hypertension, treated with atenolol.    - Again reviewed the indication for anticoagulation for primary prevention of stroke due to her high CHADSVasc score but she feels strongly against taking yet another medication and is willing to accept the risk of stroke.  - discontinue atenolol.  - remote device interrogation every 3 months.  - return to clinic in 1 year for formal device interrogation and clinic visit.    I appreciate the chance to help with Ms Soria's care. Please let me know if you have any questions or concerns.    I discussed the patient with Dr. Junaid Smith..    Giovanny Perez MD  Cardiovascular disease fellow    Attending: Patient seen and examined with Dr. Perez. The history and physical findings are accurate as recorded. My additional findings, if any, have been incorporated into the body of the note. All labs, imaging studies, ECG and telemetry data have been reviewed personally. The assessment and plans outlined reflect our joint decision making.    Junaid Smith MD

## 2017-10-09 NOTE — NURSING NOTE
Chief Complaint   Patient presents with     Follow Up For     1 year follow up for complete heart block s/p PPM, atrial fibrillation      Vitals were taken and medications were reconciled.  RASHAD Kilpatrick  3:21 PM

## 2017-10-09 NOTE — PATIENT INSTRUCTIONS
It was a pleasure to see you in clinic today.  Please do not hesitate to call us if you have any questions or concerns.  Please return to clinic in 1 year.    Next remote transmissions are scheduled on 1/9/18, 4/11/18 and 7/12/18.    Nita Ballard R.N.  Electrophysiology nurse specialist  [facility]  [address] [address]     Flora Banda  [phone] business hours    After business hours please call [phone], option #4, and ask for Job code [id number].

## 2017-10-09 NOTE — PATIENT INSTRUCTIONS
Return to clinic in one year with device check.     Please do not hesitate to call if you have any cardiology related questions or concerns, or need to schedule an appointment, at [phone].         Cardiology Medication Refill Request Information:  * Please contact your pharmacy regarding ANY request for medication refills.  ** [facility] Prescription Fax = [phone]  * Please allow 3 business days for routine medication refills.    Cardiology Test Result notification information:  *You will be notified with in 7-10 days of your appointment day regarding the results of your test. If you are on MyChart you will be notified as soon as the provider has reviewed the results and signed off on them. Please call RN Care Coordinator with questions regarding results.

## 2018-01-19 ENCOUNTER — ALLIED HEALTH/NURSE VISIT (OUTPATIENT)
Dept: CARDIOLOGY | Facility: CLINIC | Age: 83
End: 2018-01-19
Attending: INTERNAL MEDICINE
Payer: MEDICARE

## 2018-01-19 DIAGNOSIS — I44.1 AV BLOCK, 2ND DEGREE: Primary | ICD-10-CM

## 2018-01-19 PROCEDURE — 93296 REM INTERROG EVL PM/IDS: CPT | Mod: ZF

## 2018-01-19 PROCEDURE — 93294 REM INTERROG EVL PM/LDLS PM: CPT | Performed by: INTERNAL MEDICINE

## 2018-01-19 NOTE — MR AVS SNAPSHOT
"              After Visit Summary   2018    Michaela Soria    MRN: [id number]           Patient Information     Date Of Birth          1927        Visit Information        Provider Department      2018 6:00 AM  ICD REMOTE [facility]        Today's Diagnoses     AV block, 2nd degree    -  1       Follow-ups after your visit        Who to contact     If you have questions or need follow up information about today's clinic visit or your schedule please contact [facility] directly at [phone].  Normal or non-critical lab and imaging results will be communicated to you by JDCPhosphatehart, letter or phone within 4 business days after the clinic has received the results. If you do not hear from us within 7 days, please contact the clinic through JDCPhosphatehart or phone. If you have a critical or abnormal lab result, we will notify you by phone as soon as possible.  Submit refill requests through evidanza or call your pharmacy and they will forward the refill request to us. Please allow 3 business days for your refill to be completed.          Additional Information About Your Visit        MyChart Information     evidanza lets you send messages to your doctor, view your test results, renew your prescriptions, schedule appointments and more. To sign up, go to [email or web address] . Click on \"Log in\" on the left side of the screen, which will take you to the Welcome page. Then click on \"Sign up Now\" on the right side of the page.     You will be asked to enter the access code listed below, as well as some personal information. Please follow the directions to create your username and password.     Your access code is: [email or web address]  Expires: 2018  2:32 PM     Your access code will  in 90 days. If you need help or a new code, please call your [place] clinic or [phone].        Care EveryWhere ID     This is your Care EveryWhere ID. This could be used by other organizations to access your " [place] medical records  [id number]         Blood Pressure from Last 3 Encounters:   10/09/17 131/60   08/13/17 127/53   09/16/16 99/60    Weight from Last 3 Encounters:   10/09/17 59.1 kg (130 lb 6.4 oz)   08/13/17 62.6 kg (138 lb)   09/16/16 63.3 kg (139 lb 8 oz)              We Performed the Following     INTERROGATION DEVICE EVAL REMOTE, PACER/ICD        Primary Care Provider Office Phone # Fax #    Ruddy Whitten [phone]       [facility] [address] [address]        Equal Access to Services     AMANDEEP ORTIZ : Hadii aad ku hadasho Socandelario, waaxda luqadaha, qaybta kaalmada adeegyada, akash calix . So [facility] [phone].    ATENCIÓN: Si habla español, tiene a bingham disposición servicios gratuitos de asistencia lingüística. Llame al [phone].    We comply with applicable federal civil rights laws and [place] laws. We do not discriminate on the basis of race, color, national origin, age, disability, sex, sexual orientation, or gender identity.            Thank you!     Thank you for choosing [facility]  for your care. Our goal is always to provide you with excellent care. Hearing back from our patients is one way we can continue to improve our services. Please take a few minutes to complete the written survey that you may receive in the mail after your visit with us. Thank you!             Your Updated Medication List - Protect others around you: Learn how to safely use, store and throw away your medicines at www.disposemymeds.org.          This list is accurate as of 1/19/18 11:59 PM.  Always use your most recent med list.                   Brand Name Dispense Instructions for use Diagnosis    acetaminophen 650 MG 8 hour tablet     100 tablet    Take 650 mg by mouth every 4 hours as needed for mild pain    Pneumonia of right lower lobe due to infectious organism (H)       atenolol 25 MG tablet    TENORMIN     90 tablet    Take 1 tablet (25 mg) by mouth daily    Essential hypertension, benign       atorvastatin 20 MG tablet    LIPITOR    90 tablet    Take 1 tablet (20 mg) by mouth daily (Needs fasting lab work)    Hyperlipidemia LDL goal <100       blood glucose monitoring test strip    no brand specified    3 Month    1 strip by In Vitro route 3 times daily    Type 2 diabetes, HbA1C goal < 8% (H)       calcium carbonate 500 MG chewable tablet    TUMS     Take 1 chew tab by mouth 4 times daily as needed for heartburn        chlordiazePOXIDE 10 MG capsule    LIBRIUM    90 capsule    Take 1 capsule (10 mg) by mouth 3 times daily Needs to see PCP for further refills    NISHI (generalized anxiety disorder)       docusate calcium 240 MG capsule    STOOL SOFTENER    90 capsule    Take 1 capsule (240 mg) by mouth daily IN AM    Congestive heart failure, unspecified, AV block, 2nd degree       doxycycline 100 MG tablet    VIBRA-TABS    14 tablet    Take 1 tablet (100 mg) by mouth 2 times daily        fluPHENAZine 1 MG tablet    PROLIXIN    15 tablet    Take 0.5 tablets (0.5 mg) by mouth daily (Needs follow-up appointment for this medication)    Major depressive disorder, single episode, mild (H), Major depressive disorder, single episode, mild (H)       furosemide 40 MG tablet    LASIX    90 tablet    Take 1 tablet (40 mg) by mouth daily    Essential hypertension, benign       GLIPIZIDE PO      Take 5 mg by mouth 2 times daily        guaiFENesin-dextromethorphan 100-10 MG/5ML syrup    ROBITUSSIN DM    560 mL    Take 5 mLs by mouth every 4 hours as needed for cough        hydrocortisone 2.5 % cream    ANUSOL-HC     Place rectally 4 times daily as needed for hemorrhoids        lisinopril 10 MG tablet    PRINIVIL/ZESTRIL    90 tablet    Take 1 tablet (10 mg) by mouth daily    Essential hypertension, benign       metFORMIN 500 MG 24 hr tablet    GLUCOPHAGE-XR     Take 1,000 mg by mouth 2 times daily (with meals)        polyethylene  glycol 0.4%- propylene glycol 0.3% 0.4-0.3 % Soln ophthalmic solution    SYSTANE ULTRA     Place 1 drop into the right eye every 3 hours        potassium chloride 10 MEQ tablet    K-TAB,KLOR-CON    90 tablet    Take 1 tablet (10 mEq) by mouth daily    Essential hypertension, benign       timolol 0.5 % ophthalmic solution    TIMOPTIC    3 Bottle    Place 1 drop into both eyes daily    Glaucoma       TUCKS 50 % Pads      Externally apply 1 pad topically 6 times daily

## 2018-01-24 NOTE — PROGRESS NOTES
Pt sent in a routine remote pacemaker check for evaluation of her Medtronic dual chamber pacemaker per MD order.  Pts pacemaker check does not show any episodes of atrial or ventricular arrhythmias, she is RV pacing 100% of the time, her heart rate histograms show good heart rate variation and her pacemaker battery estimates 5 years left.  Pts daughter was called with the results and a message was left for her to call us back with any questions or concerns.  Next remote is 4/20/2018.    Remote pacemaker

## 2018-05-09 ENCOUNTER — ALLIED HEALTH/NURSE VISIT (OUTPATIENT)
Dept: CARDIOLOGY | Facility: CLINIC | Age: 83
End: 2018-05-09
Attending: INTERNAL MEDICINE
Payer: MEDICARE

## 2018-05-09 DIAGNOSIS — I44.2 CHB (COMPLETE HEART BLOCK) (H): Primary | ICD-10-CM

## 2018-05-09 PROCEDURE — 93296 REM INTERROG EVL PM/IDS: CPT | Mod: ZF

## 2018-05-09 PROCEDURE — 93294 REM INTERROG EVL PM/LDLS PM: CPT | Performed by: INTERNAL MEDICINE

## 2018-05-09 NOTE — MR AVS SNAPSHOT
"              After Visit Summary   5/9/2018    Michaela Soria    MRN: [id number]           Patient Information     Date Of Birth          2/13/1927        Visit Information        Provider Department      5/9/2018 6:00 AM UC ICD REMOTE [facility]        Today's Diagnoses     CHB (complete heart block) (H)    -  1       Follow-ups after your visit        Your next 10 appointments already scheduled     Oct 08, 2018  2:30 PM CDT   (Arrive by 2:15 PM)   Pacemaker Check with  Cv Device 1   [facility])    [address] [place] [address]  [address] [place] [id number] [phone]            Oct 08, 2018  3:20 PM CDT   (Arrive by 3:05 PM)   RETURN ARRHYTHMIA with Junaid Smith MD   [facility])    [address] [place] [address]  [address] [place] [address] [phone]              Who to contact     If you have questions or need follow up information about today's clinic visit or your schedule please contact [facility] directly at [phone].  Normal or non-critical lab and imaging results will be communicated to you by Avega Systemshart, letter or phone within 4 business days after the clinic has received the results. If you do not hear from us within 7 days, please contact the clinic through Illumiot or phone. If you have a critical or abnormal lab result, we will notify you by phone as soon as possible.  Submit refill requests through Beamr or call your pharmacy and they will forward the refill request to us. Please allow 3 business days for your refill to be completed.          Additional Information About Your Visit        Beamr Information     Beamr lets you send messages to your doctor, view your test results, renew your prescriptions, schedule appointments and more. To sign up, go to [email or web address] . Click on \"Log in\" on the left side of the screen, which will take you to the Welcome page. " "Then click on \"Sign up Now\" on the right side of the page.     You will be asked to enter the access code listed below, as well as some personal information. Please follow the directions to create your username and password.     Your access code is: [email or web address]  Expires: 2018 10:02 PM     Your access code will  in 90 days. If you need help or a new code, please call your [place] clinic or [phone].        Care EveryWhere ID     This is your Care EveryWhere ID. This could be used by other organizations to access your [place] medical records  [id number]         Blood Pressure from Last 3 Encounters:   18 120/55   18 121/51   10/09/17 131/60    Weight from Last 3 Encounters:   18 54.4 kg (120 lb)   18 54.4 kg (120 lb)   10/09/17 59.1 kg (130 lb 6.4 oz)              We Performed the Following     INTERROGATION DEVICE EVAL REMOTE, PACER/ICD        Primary Care Provider Office Phone # Fax #    Ruddy WhittenDO [phone]       [place] [id number] [facility] [address]        Equal Access to Services     AMANDEEP ORTIZ : Hadii julio cesar ku hadasho Soomaali, waaxda luqadaha, qaybta kaalmada adeegyada, waxay pilar lisa. So [facility] [phone].    ATENCIÓN: Si habla español, tiene a bingham disposición servicios gratuitos de asistencia lingüística. Llame al [phone].    We comply with applicable federal civil rights laws and [place] laws. We do not discriminate on the basis of race, color, national origin, age, disability, sex, sexual orientation, or gender identity.            Thank you!     Thank you for choosing [facility]  for your care. Our goal is always to provide you with excellent care. Hearing back from our patients is one way we can continue to improve our services. Please take a few minutes to complete the written survey that you may receive in the mail after your visit with us. Thank you!      "        Your Updated Medication List - Protect others around you: Learn how to safely use, store and throw away your medicines at www.disposemymeds.org.          This list is accurate as of 5/9/18 11:59 PM.  Always use your most recent med list.                   Brand Name Dispense Instructions for use Diagnosis    atenolol 25 MG tablet    TENORMIN    90 tablet    Take 1 tablet (25 mg) by mouth daily    Essential hypertension, benign       atorvastatin 20 MG tablet    LIPITOR    90 tablet    Take 1 tablet (20 mg) by mouth daily (Needs fasting lab work)    Hyperlipidemia LDL goal <100       blood glucose monitoring test strip    no brand specified    3 Month    1 strip by In Vitro route 3 times daily    Type 2 diabetes, HbA1C goal < 8% (H)       calcium carbonate 500 MG chewable tablet    TUMS     Take 1 chew tab by mouth 4 times daily as needed for heartburn        chlordiazePOXIDE 10 MG capsule    LIBRIUM    90 capsule    Take 1 capsule (10 mg) by mouth 3 times daily Needs to see PCP for further refills    NISHI (generalized anxiety disorder)       docusate calcium 240 MG capsule    STOOL SOFTENER    90 capsule    Take 1 capsule (240 mg) by mouth daily IN AM    Congestive heart failure, unspecified, AV block, 2nd degree       fluPHENAZine 1 MG tablet    PROLIXIN    15 tablet    Take 0.5 tablets (0.5 mg) by mouth daily (Needs follow-up appointment for this medication)    Major depressive disorder, single episode, mild (H), Major depressive disorder, single episode, mild (H)       furosemide 40 MG tablet    LASIX    90 tablet    Take 1 tablet (40 mg) by mouth daily    Essential hypertension, benign       GLIPIZIDE PO      Take 5 mg by mouth every morning (before breakfast)        lisinopril 10 MG tablet    PRINIVIL/ZESTRIL    90 tablet    Take 1 tablet (10 mg) by mouth daily    Essential hypertension, benign       metFORMIN 500 MG 24 hr tablet    GLUCOPHAGE-XR     Take 1,000 mg by mouth 2 times daily (with meals)         polyethylene glycol 0.4%- propylene glycol 0.3% 0.4-0.3 % Soln ophthalmic solution    SYSTANE ULTRA     Place 1 drop into the right eye every 3 hours        potassium chloride 10 MEQ tablet    K-TAB,KLOR-CON    90 tablet    Take 1 tablet (10 mEq) by mouth daily    Essential hypertension, benign       timolol 0.5 % ophthalmic solution    TIMOPTIC    3 Bottle    Place 1 drop into both eyes daily    Glaucoma

## 2018-05-25 ENCOUNTER — APPOINTMENT (OUTPATIENT)
Dept: GENERAL RADIOLOGY | Facility: CLINIC | Age: 83
End: 2018-05-25
Attending: EMERGENCY MEDICINE
Payer: MEDICARE

## 2018-05-25 ENCOUNTER — HOSPITAL ENCOUNTER (EMERGENCY)
Facility: CLINIC | Age: 83
Discharge: HOME | End: 2018-05-25
Attending: EMERGENCY MEDICINE | Admitting: EMERGENCY MEDICINE
Payer: MEDICARE

## 2018-05-25 VITALS
RESPIRATION RATE: 17 BRPM | WEIGHT: 120 LBS | OXYGEN SATURATION: 98 % | TEMPERATURE: 98.4 F | DIASTOLIC BLOOD PRESSURE: 51 MMHG | BODY MASS INDEX: 24.24 KG/M2 | SYSTOLIC BLOOD PRESSURE: 121 MMHG | HEART RATE: 90 BPM

## 2018-05-25 DIAGNOSIS — R22.43 LOCALIZED SWELLING OF BOTH LOWER LEGS: ICD-10-CM

## 2018-05-25 DIAGNOSIS — D64.9 ANEMIA, UNSPECIFIED TYPE: ICD-10-CM

## 2018-05-25 DIAGNOSIS — K64.4 EXTERNAL HEMORRHOIDS: ICD-10-CM

## 2018-05-25 LAB
ALBUMIN SERPL-MCNC: 3.4 G/DL (ref 3.4–5)
ALP SERPL-CCNC: 91 U/L (ref 40–150)
ALT SERPL W P-5'-P-CCNC: 30 U/L (ref 0–50)
ANION GAP SERPL CALCULATED.3IONS-SCNC: 8 MMOL/L (ref 3–14)
AST SERPL W P-5'-P-CCNC: 21 U/L (ref 0–45)
BASOPHILS # BLD AUTO: 0 10E9/L (ref 0–0.2)
BASOPHILS NFR BLD AUTO: 0.5 %
BILIRUB SERPL-MCNC: 0.2 MG/DL (ref 0.2–1.3)
BUN SERPL-MCNC: 33 MG/DL (ref 7–30)
CALCIUM SERPL-MCNC: 9.2 MG/DL (ref 8.5–10.1)
CHLORIDE SERPL-SCNC: 107 MMOL/L (ref 94–109)
CO2 SERPL-SCNC: 29 MMOL/L (ref 20–32)
CREAT SERPL-MCNC: 1.18 MG/DL (ref 0.52–1.04)
DIFFERENTIAL METHOD BLD: ABNORMAL
EOSINOPHIL # BLD AUTO: 0.3 10E9/L (ref 0–0.7)
EOSINOPHIL NFR BLD AUTO: 4.1 %
ERYTHROCYTE [DISTWIDTH] IN BLOOD BY AUTOMATED COUNT: 16.7 % (ref 10–15)
GFR SERPL CREATININE-BSD FRML MDRD: 43 ML/MIN/1.7M2
GLUCOSE SERPL-MCNC: 97 MG/DL (ref 70–99)
HCT VFR BLD AUTO: 26.2 % (ref 35–47)
HGB BLD-MCNC: 7.6 G/DL (ref 11.7–15.7)
IMM GRANULOCYTES # BLD: 0 10E9/L (ref 0–0.4)
IMM GRANULOCYTES NFR BLD: 0.1 %
INR PPP: 1.04 (ref 0.86–1.14)
LYMPHOCYTES # BLD AUTO: 1.8 10E9/L (ref 0.8–5.3)
LYMPHOCYTES NFR BLD AUTO: 21.9 %
MCH RBC QN AUTO: 19.4 PG (ref 26.5–33)
MCHC RBC AUTO-ENTMCNC: 29 G/DL (ref 31.5–36.5)
MCV RBC AUTO: 67 FL (ref 78–100)
MONOCYTES # BLD AUTO: 0.8 10E9/L (ref 0–1.3)
MONOCYTES NFR BLD AUTO: 10.4 %
NEUTROPHILS # BLD AUTO: 5.1 10E9/L (ref 1.6–8.3)
NEUTROPHILS NFR BLD AUTO: 63 %
NT-PROBNP SERPL-MCNC: 337 PG/ML (ref 0–1800)
PLATELET # BLD AUTO: 282 10E9/L (ref 150–450)
POTASSIUM SERPL-SCNC: 3.9 MMOL/L (ref 3.4–5.3)
PROT SERPL-MCNC: 6.6 G/DL (ref 6.8–8.8)
RBC # BLD AUTO: 3.92 10E12/L (ref 3.8–5.2)
SODIUM SERPL-SCNC: 144 MMOL/L (ref 133–144)
TROPONIN I SERPL-MCNC: <0.015 UG/L (ref 0–0.04)
WBC # BLD AUTO: 8.1 10E9/L (ref 4–11)

## 2018-05-25 PROCEDURE — 71046 X-RAY EXAM CHEST 2 VIEWS: CPT

## 2018-05-25 PROCEDURE — 93010 ELECTROCARDIOGRAM REPORT: CPT | Mod: Z6 | Performed by: EMERGENCY MEDICINE

## 2018-05-25 PROCEDURE — 83880 ASSAY OF NATRIURETIC PEPTIDE: CPT | Performed by: EMERGENCY MEDICINE

## 2018-05-25 PROCEDURE — 85025 COMPLETE CBC W/AUTO DIFF WBC: CPT | Performed by: EMERGENCY MEDICINE

## 2018-05-25 PROCEDURE — 99285 EMERGENCY DEPT VISIT HI MDM: CPT | Mod: 25 | Performed by: EMERGENCY MEDICINE

## 2018-05-25 PROCEDURE — 85610 PROTHROMBIN TIME: CPT | Performed by: EMERGENCY MEDICINE

## 2018-05-25 PROCEDURE — 93005 ELECTROCARDIOGRAM TRACING: CPT | Performed by: EMERGENCY MEDICINE

## 2018-05-25 PROCEDURE — 84484 ASSAY OF TROPONIN QUANT: CPT | Performed by: EMERGENCY MEDICINE

## 2018-05-25 PROCEDURE — 80053 COMPREHEN METABOLIC PANEL: CPT | Performed by: EMERGENCY MEDICINE

## 2018-05-25 ASSESSMENT — ENCOUNTER SYMPTOMS
UNEXPECTED WEIGHT CHANGE: 0
SHORTNESS OF BREATH: 1
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
## 2018-05-25 NOTE — ED AVS SNAPSHOT
[facility] Emergency Department    [address] [address]    Phone:  [phone]    Fax:  [phone]                                       Michaela Soria   MRN: [id number]    Department:  [facility] Emergency Department   Date of Visit:  5/25/2018           After Visit Summary Signature Page     I have received my discharge instructions, and my questions have been answered. I have discussed any challenges I see with this plan with the nurse or doctor.    ..........................................................................................................................................  Patient/Patient Representative Signature      ..........................................................................................................................................  Patient Representative Print Name and Relationship to Patient    ..................................................               ................................................  Date                                            Time    ..........................................................................................................................................  Reviewed by Signature/Title    ...................................................              ..............................................  Date                                                            Time

## 2018-05-25 NOTE — ED AVS SNAPSHOT
[facility]    [address]    [facility] [address]    Phone:  [phone]    Fax:  [phone]                                       Michaela Soria   MRN: [id number]    Department:  [facility]   Date of Visit:  5/25/2018           Patient Information     Date Of Birth          2/13/1927        Your diagnoses for this visit were:     Localized swelling of both lower legs     Anemia, unspecified type     External hemorrhoids        You were seen by Woody Weir MD.      Follow-up Information     Follow up with Ruddy Whitten DO.    Specialty:  Family Practice    Why:  Next week to recheck her hemoglobin and to do other iron studies.    Contact information:    [facility]  [address] [address]  [phone]          Discharge Instructions       Use the Anusol cream to help with your hemorrhoid pain.  Follow-up with your doctor next week to recheck your hemoglobin and to do additional studies for your anemia.  Consider a colonoscopy which her doctor can arrange.      Discharge References/Attachments     HEMORRHOIDS (ENGLISH)    ANEMIA (ENGLISH)    LEG SWELLING IN BOTH LEGS (ENGLISH)      Your next 10 appointments already scheduled     Oct 08, 2018  2:30 PM CDT   (Arrive by 2:15 PM)   Pacemaker Check with Uc Cv Device 1   [facility] ([facility])    [address] [place] [address]  [address] [place] [address] [phone]            Oct 08, 2018  3:20 PM CDT   (Arrive by 3:05 PM)   RETURN ARRHYTHMIA with Junaid Smith MD   [facility] ([facility])    [address] [place] [address]  [address] [place] [address] [phone]              24 Hour Appointment Hotline       To make an appointment at any [facility], call [phone] ([phone]). If you don't have a family doctor or clinic, we will help you find one. [facility] are  conveniently located to serve the needs of you and your family.             Review of your medicines      START taking        Dose / Directions Last dose taken    hydrocortisone 2.5 % cream   Commonly known as:  ANUSOL-HC   Quantity:  30 g        Applied to the rectum twice daily and after bowel movement.   Refills:  1          Our records show that you are taking the medicines listed below. If these are incorrect, please call your family doctor or clinic.        Dose / Directions Last dose taken    atenolol 25 MG tablet   Commonly known as:  TENORMIN   Dose:  25 mg   Quantity:  90 tablet        Take 1 tablet (25 mg) by mouth daily   Refills:  0        atorvastatin 20 MG tablet   Commonly known as:  LIPITOR   Dose:  20 mg   Quantity:  90 tablet        Take 1 tablet (20 mg) by mouth daily (Needs fasting lab work)   Refills:  1        blood glucose monitoring test strip   Commonly known as:  no brand specified   Dose:  1 strip   Quantity:  3 Month        1 strip by In Vitro route 3 times daily   Refills:  2        calcium carbonate 500 MG chewable tablet   Commonly known as:  TUMS   Dose:  1 chew tab        Take 1 chew tab by mouth 4 times daily as needed for heartburn   Refills:  0        chlordiazePOXIDE 10 MG capsule   Commonly known as:  LIBRIUM   Dose:  10 mg   Quantity:  90 capsule        Take 1 capsule (10 mg) by mouth 3 times daily Needs to see PCP for further refills   Refills:  0        docusate calcium 240 MG capsule   Commonly known as:  STOOL SOFTENER   Dose:  240 mg   Quantity:  90 capsule        Take 1 capsule (240 mg) by mouth daily IN AM   Refills:  3        fluPHENAZine 1 MG tablet   Commonly known as:  PROLIXIN   Dose:  0.5 mg   Quantity:  15 tablet        Take 0.5 tablets (0.5 mg) by mouth daily (Needs follow-up appointment for this medication)   Refills:  0        furosemide 40 MG tablet   Commonly known as:  LASIX   Dose:  40 mg   Indication:  Congestive Heart Failure   Quantity:  90 tablet         Take 1 tablet (40 mg) by mouth daily   Refills:  0        GLIPIZIDE PO   Dose:  5 mg        Take 5 mg by mouth every morning (before breakfast)   Refills:  0        lisinopril 10 MG tablet   Commonly known as:  PRINIVIL/ZESTRIL   Dose:  10 mg   Quantity:  90 tablet        Take 1 tablet (10 mg) by mouth daily   Refills:  3        metFORMIN 500 MG 24 hr tablet   Commonly known as:  GLUCOPHAGE-XR   Dose:  1000 mg        Take 1,000 mg by mouth 2 times daily (with meals)   Refills:  0        polyethylene glycol 0.4%- propylene glycol 0.3% 0.4-0.3 % Soln ophthalmic solution   Commonly known as:  SYSTANE ULTRA   Dose:  1 drop        Place 1 drop into the right eye every 3 hours   Refills:  0        potassium chloride 10 MEQ tablet   Commonly known as:  K-TAB,KLOR-CON   Dose:  10 mEq   Quantity:  90 tablet        Take 1 tablet (10 mEq) by mouth daily   Refills:  3        timolol 0.5 % ophthalmic solution   Commonly known as:  TIMOPTIC   Dose:  1 drop   Quantity:  3 Bottle        Place 1 drop into both eyes daily   Refills:  3                Prescriptions were sent or printed at these locations (1 Prescription)                   [facility] [place], [place] - [address]   [address] [address]    Telephone:  [phone]   Fax:  [phone]   Hours:                  Printed at Department/Unit printer (1 of 1)         hydrocortisone (ANUSOL-HC) 2.5 % cream                Procedures and tests performed during your visit     CBC with platelets differential    Comprehensive metabolic panel    EKG 12-lead, tracing only    INR    Nt probnp inpatient (BNP)    Troponin I    XR Chest 2 Views      Orders Needing Specimen Collection     None      Pending Results     Date and Time Order Name Status Description    5/25/2018 1853 XR Chest 2 Views Preliminary             Pending Culture Results     No orders found from 5/23/2018 to 5/26/2018.            Pending Results Instructions     If you had any  lab results that were not finalized at the time of your Discharge, you can call the ED Lab Result RN at [phone]. You will be contacted by this team for any positive Lab results or changes in treatment. The nurses are available 7 days a week from 10A to 6:30P.  You can leave a message 24 hours per day and they will return your call.        Test Results From Your Hospital Stay        5/25/2018  7:40 PM      Component Results     Component Value Ref Range & Units Status    WBC 8.1 4.0 - 11.0 10e9/L Final    RBC Count 3.92 3.8 - 5.2 10e12/L Final    Hemoglobin 7.6 (L) 11.7 - 15.7 g/dL Final    Hematocrit 26.2 (L) 35.0 - 47.0 % Final    MCV 67 (L) 78 - 100 fl Final    MCH 19.4 (L) 26.5 - 33.0 pg Final    MCHC 29.0 (L) 31.5 - 36.5 g/dL Final    RDW 16.7 (H) 10.0 - 15.0 % Final    Platelet Count 282 150 - 450 10e9/L Final    Diff Method Automated Method  Final    % Neutrophils 63.0 % Final    % Lymphocytes 21.9 % Final    % Monocytes 10.4 % Final    % Eosinophils 4.1 % Final    % Basophils 0.5 % Final    % Immature Granulocytes 0.1 % Final    Absolute Neutrophil 5.1 1.6 - 8.3 10e9/L Final    Absolute Lymphocytes 1.8 0.8 - 5.3 10e9/L Final    Absolute Monocytes 0.8 0.0 - 1.3 10e9/L Final    Absolute Eosinophils 0.3 0.0 - 0.7 10e9/L Final    Absolute Basophils 0.0 0.0 - 0.2 10e9/L Final    Abs Immature Granulocytes 0.0 0 - 0.4 10e9/L Final         5/25/2018  7:44 PM      Component Results     Component Value Ref Range & Units Status    INR 1.04 0.86 - 1.14 Final         5/25/2018  7:55 PM      Component Results     Component Value Ref Range & Units Status    Sodium 144 133 - 144 mmol/L Final    Potassium 3.9 3.4 - 5.3 mmol/L Final    Chloride 107 94 - 109 mmol/L Final    Carbon Dioxide 29 20 - 32 mmol/L Final    Anion Gap 8 3 - 14 mmol/L Final    Glucose 97 70 - 99 mg/dL Final    Urea Nitrogen 33 (H) 7 - 30 mg/dL Final    Creatinine 1.18 (H) 0.52 - 1.04 mg/dL Final    GFR Estimate 43 (L) >60 mL/min/1.7m2 Final    Non   GFR Calc    GFR Estimate If Black 52 (L) >60 mL/min/1.7m2 Final    African American GFR Calc    Calcium 9.2 8.5 - 10.1 mg/dL Final    Bilirubin Total 0.2 0.2 - 1.3 mg/dL Final    Albumin 3.4 3.4 - 5.0 g/dL Final    Protein Total 6.6 (L) 6.8 - 8.8 g/dL Final    Alkaline Phosphatase 91 40 - 150 U/L Final    ALT 30 0 - 50 U/L Final    AST 21 0 - 45 U/L Final         5/25/2018  7:55 PM      Component Results     Component Value Ref Range & Units Status    Troponin I ES <0.015 0.000 - 0.045 ug/L Final    The 99th percentile for upper reference range is 0.045 ug/L.  Troponin values   in the range of 0.045 - 0.120 ug/L may be associated with risks of adverse   clinical events.           5/25/2018  7:55 PM      Component Results     Component Value Ref Range & Units Status    N-Terminal Pro BNP Inpatient 337 0 - 1800 pg/mL Final       Reference range shown and results flagged as abnormal are suggested inpatient   cut points for confirming diagnosis if CHF in an acute setting. Establishing a   baseline value for each individual patient is useful for follow-up. An   inpatient or emergency department NT-proPBNP <300 pg/mL effectively rules out   acute CHF, with 99% negative predictive value.  The outpatient non-acute reference range for ruling out CHF is:   0-125 pg/mL (age 18 to less than 75)   0-450 pg/mL (age 75 yrs and older)           5/25/2018  8:43 PM      Narrative     CHEST TWO VIEWS  5/25/2018 8:31 PM     COMPARISON: Two view chest x-ray 8/13/2017.    HISTORY: Shortness of breath.    FINDINGS: A dual-lead pacemaker module implanted over the left chest  with the leads in the right atrium and right ventricle is again noted  without identifiable change.    The cardiac silhouette, pulmonary vasculature, lungs and pleural  spaces are within normal limits.        Impression     IMPRESSION: Clear lungs.                Thank you for choosing [facility]       Thank you for choosing [facility] for your care. Our  "goal is always to provide you with excellent care. Hearing back from our patients is one way we can continue to improve our services. Please take a few minutes to complete the written survey that you may receive in the mail after you visit with us. Thank you!        [employer] Information     [employer] lets you send messages to your doctor, view your test results, renew your prescriptions, schedule appointments and more. To sign up, go to [email or web address] . Click on \"Log in\" on the left side of the screen, which will take you to the Welcome page. Then click on \"Sign up Now\" on the right side of the page.     You will be asked to enter the access code listed below, as well as some personal information. Please follow the directions to create your username and password.     Your access code is: [email or web address]  Expires: 2018 10:02 PM     Your access code will  in 90 days. If you need help or a new code, please call your [facility] clinic or [phone].        Care EveryWhere ID     This is your Care EveryWhere ID. This could be used by other organizations to access your [facility] medical records  [id number]        Equal Access to Services     AMANDEEP ORTIZ AH: Marcos Vides, wakayla jensen, qajerome kanitin pyle, akash lisa. So [facility] [phone].    ATENCIÓN: Si habla español, tiene a bingham disposición servicios gratuitos de asistencia lingüística. Pool al [phone].    We comply with applicable federal civil rights laws and [place] laws. We do not discriminate on the basis of race, color, national origin, age, disability, sex, sexual orientation, or gender identity.            After Visit Summary       This is your record. Keep this with you and show to your community pharmacist(s) and doctor(s) at your next visit.                  "

## 2018-05-25 NOTE — ED NOTES
bilat + 4 pitting LE edema bilat. Pt has CHF, lives with daughter and son in law. Increasing SOA. No noted dyspnea when transferring from bed w/c to bed. Pt is deaf and uses a phone xena to read communication. Pt does not have CP.

## 2018-05-26 NOTE — DISCHARGE INSTRUCTIONS
Use the Anusol cream to help with your hemorrhoid pain.  Follow-up with your doctor next week to recheck your hemoglobin and to do additional studies for your anemia.  Consider a colonoscopy which her doctor can arrange.

## 2018-06-04 ENCOUNTER — SURGERY (OUTPATIENT)
Age: 83
End: 2018-06-04

## 2018-06-04 ENCOUNTER — ANESTHESIA (OUTPATIENT)
Dept: GASTROENTEROLOGY | Facility: CLINIC | Age: 83
End: 2018-06-04
Payer: MEDICARE

## 2018-06-04 ENCOUNTER — HOSPITAL ENCOUNTER (OUTPATIENT)
Facility: CLINIC | Age: 83
Discharge: HOME | End: 2018-06-04
Attending: SURGERY | Admitting: SURGERY
Payer: MEDICARE

## 2018-06-04 ENCOUNTER — ANESTHESIA EVENT (OUTPATIENT)
Dept: GASTROENTEROLOGY | Facility: CLINIC | Age: 83
End: 2018-06-04
Payer: MEDICARE

## 2018-06-04 VITALS
OXYGEN SATURATION: 97 % | RESPIRATION RATE: 16 BRPM | BODY MASS INDEX: 24.24 KG/M2 | DIASTOLIC BLOOD PRESSURE: 55 MMHG | WEIGHT: 120 LBS | SYSTOLIC BLOOD PRESSURE: 120 MMHG | HEART RATE: 68 BPM

## 2018-06-04 LAB
COLONOSCOPY: NORMAL
GLUCOSE BLDC GLUCOMTR-MCNC: 104 MG/DL (ref 70–99)

## 2018-06-04 PROCEDURE — 82962 GLUCOSE BLOOD TEST: CPT

## 2018-06-04 PROCEDURE — 25000128 H RX IP 250 OP 636: Performed by: SURGERY

## 2018-06-04 PROCEDURE — 25000125 ZZHC RX 250: Performed by: NURSE ANESTHETIST, CERTIFIED REGISTERED

## 2018-06-04 PROCEDURE — 45378 DIAGNOSTIC COLONOSCOPY: CPT | Performed by: SURGERY

## 2018-06-04 PROCEDURE — 25000128 H RX IP 250 OP 636: Performed by: NURSE ANESTHETIST, CERTIFIED REGISTERED

## 2018-06-04 PROCEDURE — 37000008 ZZH ANESTHESIA TECHNICAL FEE, 1ST 30 MIN: Performed by: SURGERY

## 2018-06-04 RX ORDER — SODIUM CHLORIDE, SODIUM LACTATE, POTASSIUM CHLORIDE, CALCIUM CHLORIDE 600; 310; 30; 20 MG/100ML; MG/100ML; MG/100ML; MG/100ML
INJECTION, SOLUTION INTRAVENOUS CONTINUOUS
Status: DISCONTINUED | OUTPATIENT
Start: 2018-06-04 | End: 2018-06-04 | Stop reason: HOSPADM

## 2018-06-04 RX ORDER — PROPOFOL 10 MG/ML
INJECTION, EMULSION INTRAVENOUS CONTINUOUS PRN
Status: DISCONTINUED | OUTPATIENT
Start: 2018-06-04 | End: 2018-06-04

## 2018-06-04 RX ORDER — LIDOCAINE HYDROCHLORIDE 10 MG/ML
INJECTION, SOLUTION INFILTRATION; PERINEURAL PRN
Status: DISCONTINUED | OUTPATIENT
Start: 2018-06-04 | End: 2018-06-04

## 2018-06-04 RX ADMIN — SODIUM CHLORIDE, POTASSIUM CHLORIDE, SODIUM LACTATE AND CALCIUM CHLORIDE: 600; 310; 30; 20 INJECTION, SOLUTION INTRAVENOUS at 14:06

## 2018-06-04 RX ADMIN — PROPOFOL 200 MCG/KG/MIN: 10 INJECTION, EMULSION INTRAVENOUS at 15:07

## 2018-06-04 RX ADMIN — LIDOCAINE HYDROCHLORIDE 50 MG: 10 INJECTION, SOLUTION INFILTRATION; PERINEURAL at 15:07

## 2018-06-04 ASSESSMENT — LIFESTYLE VARIABLES: TOBACCO_USE: 1

## 2018-06-04 ASSESSMENT — ENCOUNTER SYMPTOMS: DYSRHYTHMIAS: 1

## 2018-06-04 NOTE — ANESTHESIA CARE TRANSFER NOTE
Patient: Michaela Soria    Procedure(s):  colonoscopy - Wound Class: II-Clean Contaminated    Diagnosis: rectal bleeding    diagnostic  Diagnosis Additional Information: No value filed.    Anesthesia Type:   No value filed.     Note:  Airway :Room Air  Patient transferred to:Phase II  Handoff Report: Identifed the Patient, Identified the Reponsible Provider, Reviewed the pertinent medical history, Discussed the surgical course, Reviewed Intra-OP anesthesia mangement and issues during anesthesia, Set expectations for post-procedure period and Allowed opportunity for questions and acknowledgement of understanding      Vitals: (Last set prior to Anesthesia Care Transfer)    CRNA VITALS  6/4/2018 1451 - 6/4/2018 1522      6/4/2018             Pulse: 64    Ht Rate: 80    SpO2: 100 %                Electronically Signed By: VIV Reyes CRNA  June 4, 2018  3:22 PM

## 2018-06-04 NOTE — H&P
89 year old year old female here for colonoscopy for blood in stool.    Patient Active Problem List   Diagnosis     DEPRESSION,  PSYCHOSIS-MILD     Congestive heart failure (H)     HYPERTENSION     Fitting and adjustment of hearing aid     Diverticulosis of large intestine     Dyskinesia of esophagus     Nonspecific abnormal results of liver function study     Asymptomatic postmenopausal status     Personal history of malignant neoplasm of breast     Glaucoma     KNEE ARTHRITIS     External hemorrhoids     Hearing loss     Hyperlipidemia LDL goal <100     Advanced directives, counseling/discussion     Health Care Home     AV block, 2nd degree     Cardiac pacemaker - Medtronic, dual chamber- DEPENDENT (Advisa: MRI conditional)     Senile nuclear sclerosis     Type 2 diabetes mellitus without complication (H)     Sepsis (H)     Pneumonia of right lower lobe due to Haemophilus influenzae (H)     Generalized anxiety disorder     CHB (complete heart block) (H)       Past Medical History:   Diagnosis Date     Congestive heart failure, unspecified      Diabetes mellitus (H)      Hypertension      Malignant neoplasm (H)     Breast       Past Surgical History:   Procedure Laterality Date     DILATION AND CURETTAGE       ENT SURGERY      R ear     HC COLONOSCOPY THRU STOMA, DIAGNOSTIC  04/23/2001     HC REMOVE TONSILS/ADENOIDS,<13 Y/O       HEMORRHOIDECTOMY       IMPLANT PACEMAKER  03/17/2014    symptomatic bradycardia     MASTECTOMY, MOD RADICAL (ANY)  1989    BILATERAL     PHACOEMULSIFICATION WITH STANDARD INTRAOCULAR LENS IMPLANT Left 5/4/2015    Procedure: PHACOEMULSIFICATION WITH STANDARD INTRAOCULAR LENS IMPLANT;  Surgeon: Naif Campbell MD;  Location: [facility] OR     PHACOEMULSIFICATION WITH STANDARD INTRAOCULAR LENS IMPLANT Right 8/13/2015    Procedure: PHACOEMULSIFICATION WITH STANDARD INTRAOCULAR LENS IMPLANT;  Surgeon: Naif Campbell MD;  Location: [facility] OR       @[facility]@     current outpatient prescriptions on  file.       Allergies   Allergen Reactions     Sulfa Drugs Nausea     mouth sores       Zomig [Zolmitriptan] Other (See Comments)     depression         Pt reports that she quit smoking about 37 years ago. Her smoking use included Cigarettes. She has a 2.00 pack-year smoking history. She has never used smokeless tobacco. She reports that she does not drink alcohol or use illicit drugs.    Exam:  /55  Resp 20  Wt 54.4 kg (120 lb)  Breastfeeding? No  BMI 24.24 kg/m2    Awake, Alert OX3  Lungs - CTA bilaterally  CV - RRR, no murmurs, distal pulses intact  Abd - soft, non-distended, non-tender, +BS  Extr - No cyanosis or edema    A/P 89 year old year old female in need of colonoscopy for blood in stool. Risks, benefits, alternatives, and complications were discussed including the possibility of perforation and the patient agreed to proceed.    Andres Casarez MD

## 2018-06-04 NOTE — ANESTHESIA POSTPROCEDURE EVALUATION
Patient: Michaela Soria    Procedure(s):  colonoscopy - Wound Class: II-Clean Contaminated    Diagnosis:rectal bleeding    diagnostic  Diagnosis Additional Information: No value filed.    Anesthesia Type:  No value filed.    Note:  Anesthesia Post Evaluation    Patient location during evaluation: Phase 2  Patient participation: Able to fully participate in evaluation  Level of consciousness: awake  Pain management: adequate  Airway patency: patent  Cardiovascular status: acceptable and hemodynamically stable  Respiratory status: acceptable, room air and spontaneous ventilation  Hydration status: acceptable  PONV: none     Anesthetic complications: None          Last vitals:  Vitals:    06/04/18 1402   BP: 126/55   Resp: 20         Electronically Signed By: VIV Reyes CRNA  June 4, 2018  3:22 PM

## 2018-06-04 NOTE — ANESTHESIA PREPROCEDURE EVALUATION
Anesthesia Evaluation     . Pt has had prior anesthetic. Type: General and MAC    No history of anesthetic complications          ROS/MED HX    ENT/Pulmonary:     (+)tobacco use, Past use , . .    Neurologic:  - neg neurologic ROS   (+)other neuro severe [place]    Cardiovascular:     (+) Dyslipidemia, hypertension----. : . CHF . . pacemaker :. dysrhythmias 2nd Deg Heart Block and Other, . Previous cardiac testing date:results:date: results:ECG reviewed date:5-2018 results:Electronic ventricular pacemaker -possibly demand type   Pacemaker ECG, No further analysis   INSUFFICIENT DATA date: results:          METS/Exercise Tolerance:  1 - Eating, dressing   Hematologic:     (+) Anemia, -      Musculoskeletal:   (+) arthritis, , , -       GI/Hepatic:     (+) GERD Other GI/Hepatic rectal bleeding, diverticulosis      Renal/Genitourinary:         Endo:     (+) type II DM Not using insulin - not using insulin pump .      Psychiatric:     (+) psychiatric history depression and anxiety      Infectious Disease:  - neg infectious disease ROS       Malignancy:   (+) Malignancy History of Breast  Breast CA Remission status post Surgery.         Other:    - neg other ROS                 Physical Exam  Normal systems: cardiovascular, pulmonary and dental    Airway   Mallampati: II  TM distance: >3 FB  Neck ROM: limited    Dental     Cardiovascular       Pulmonary                     Anesthesia Plan      History & Physical Review  History and physical reviewed and following examination; no interval change.    ASA Status:  3 .    NPO Status:  > 4 hours    Plan for MAC Maintenance will be Balanced.  Reason for MAC:  Deep or markedly invasive procedure (G8)         Postoperative Care      Consents  Anesthetic plan, risks, benefits and alternatives discussed with:  Patient..                          .

## 2018-06-11 NOTE — PROGRESS NOTES
Preliminary Device Interrogation Results.  Final physician signed paceart report to be scanned and attached.    Remote pacemaker transmission received and reviewed.  Device transmission sent per MD orders.  Patient has a Medtronic dual lead pacemaker.  Normal pacemaker function.  1 AT/AF episode recorded - 2 seconds in duration.  Presenting EGM = AS- @ 94 bpm.  AP = 17.6%.   = 90.5%.  Estimated battery longevity to ANGELLA = 4.5 years.  Pts daughter (Tessa) notified of transmission results. Plan for pt to return to clinic on 10/8/2018 with Dr Smith.     Remote pacemaker transmission

## 2019-01-29 ENCOUNTER — ANCILLARY PROCEDURE (OUTPATIENT)
Dept: CARDIOLOGY | Facility: CLINIC | Age: 84
End: 2019-01-29
Attending: INTERNAL MEDICINE
Payer: MEDICARE

## 2019-01-29 DIAGNOSIS — I44.30 AV BLOCK: ICD-10-CM

## 2019-01-29 PROCEDURE — 93296 REM INTERROG EVL PM/IDS: CPT | Mod: ZF

## 2019-01-29 PROCEDURE — 93294 REM INTERROG EVL PM/LDLS PM: CPT | Performed by: INTERNAL MEDICINE

## 2019-02-13 LAB
MDC_IDC_EPISODE_DTM: NORMAL
MDC_IDC_EPISODE_DURATION: 131 S
MDC_IDC_EPISODE_ID: 958
MDC_IDC_EPISODE_TYPE: NORMAL
MDC_IDC_LEAD_IMPLANT_DT: NORMAL
MDC_IDC_LEAD_IMPLANT_DT: NORMAL
MDC_IDC_LEAD_LOCATION: NORMAL
MDC_IDC_LEAD_LOCATION: NORMAL
MDC_IDC_LEAD_LOCATION_DETAIL_1: NORMAL
MDC_IDC_LEAD_LOCATION_DETAIL_1: NORMAL
MDC_IDC_LEAD_MFG: NORMAL
MDC_IDC_LEAD_MFG: NORMAL
MDC_IDC_LEAD_MODEL: NORMAL
MDC_IDC_LEAD_MODEL: NORMAL
MDC_IDC_LEAD_POLARITY_TYPE: NORMAL
MDC_IDC_LEAD_POLARITY_TYPE: NORMAL
MDC_IDC_LEAD_SERIAL: NORMAL
MDC_IDC_LEAD_SERIAL: NORMAL
MDC_IDC_MSMT_BATTERY_DTM: NORMAL
MDC_IDC_MSMT_BATTERY_REMAINING_LONGEVITY: 50 MO
MDC_IDC_MSMT_BATTERY_RRT_TRIGGER: 2.83
MDC_IDC_MSMT_BATTERY_STATUS: NORMAL
MDC_IDC_MSMT_BATTERY_VOLTAGE: 2.98 V
MDC_IDC_MSMT_LEADCHNL_RA_IMPEDANCE_VALUE: 380 OHM
MDC_IDC_MSMT_LEADCHNL_RA_IMPEDANCE_VALUE: 418 OHM
MDC_IDC_MSMT_LEADCHNL_RA_PACING_THRESHOLD_AMPLITUDE: 0.5 V
MDC_IDC_MSMT_LEADCHNL_RA_PACING_THRESHOLD_PULSEWIDTH: 0.4 MS
MDC_IDC_MSMT_LEADCHNL_RA_SENSING_INTR_AMPL: 2.38 MV
MDC_IDC_MSMT_LEADCHNL_RA_SENSING_INTR_AMPL: 2.38 MV
MDC_IDC_MSMT_LEADCHNL_RV_IMPEDANCE_VALUE: 380 OHM
MDC_IDC_MSMT_LEADCHNL_RV_IMPEDANCE_VALUE: 475 OHM
MDC_IDC_MSMT_LEADCHNL_RV_PACING_THRESHOLD_AMPLITUDE: 0.75 V
MDC_IDC_MSMT_LEADCHNL_RV_PACING_THRESHOLD_PULSEWIDTH: 0.4 MS
MDC_IDC_MSMT_LEADCHNL_RV_SENSING_INTR_AMPL: 8.75 MV
MDC_IDC_MSMT_LEADCHNL_RV_SENSING_INTR_AMPL: 8.75 MV
MDC_IDC_PG_IMPLANT_DTM: NORMAL
MDC_IDC_PG_MFG: NORMAL
MDC_IDC_PG_MODEL: NORMAL
MDC_IDC_PG_SERIAL: NORMAL
MDC_IDC_PG_TYPE: NORMAL
MDC_IDC_SESS_CLINIC_NAME: NORMAL
MDC_IDC_SESS_DTM: NORMAL
MDC_IDC_SESS_TYPE: NORMAL
MDC_IDC_SET_BRADY_AT_MODE_SWITCH_RATE: 171 {BEATS}/MIN
MDC_IDC_SET_BRADY_HYSTRATE: NORMAL
MDC_IDC_SET_BRADY_LOWRATE: 60 {BEATS}/MIN
MDC_IDC_SET_BRADY_MAX_SENSOR_RATE: 120 {BEATS}/MIN
MDC_IDC_SET_BRADY_MAX_TRACKING_RATE: 120 {BEATS}/MIN
MDC_IDC_SET_BRADY_MODE: NORMAL
MDC_IDC_SET_BRADY_PAV_DELAY_LOW: 180 MS
MDC_IDC_SET_BRADY_SAV_DELAY_LOW: 150 MS
MDC_IDC_SET_LEADCHNL_RA_PACING_AMPLITUDE: 1.5 V
MDC_IDC_SET_LEADCHNL_RA_PACING_ANODE_ELECTRODE_1: NORMAL
MDC_IDC_SET_LEADCHNL_RA_PACING_ANODE_LOCATION_1: NORMAL
MDC_IDC_SET_LEADCHNL_RA_PACING_CAPTURE_MODE: NORMAL
MDC_IDC_SET_LEADCHNL_RA_PACING_CATHODE_ELECTRODE_1: NORMAL
MDC_IDC_SET_LEADCHNL_RA_PACING_CATHODE_LOCATION_1: NORMAL
MDC_IDC_SET_LEADCHNL_RA_PACING_POLARITY: NORMAL
MDC_IDC_SET_LEADCHNL_RA_PACING_PULSEWIDTH: 0.4 MS
MDC_IDC_SET_LEADCHNL_RA_SENSING_ANODE_ELECTRODE_1: NORMAL
MDC_IDC_SET_LEADCHNL_RA_SENSING_ANODE_LOCATION_1: NORMAL
MDC_IDC_SET_LEADCHNL_RA_SENSING_CATHODE_ELECTRODE_1: NORMAL
MDC_IDC_SET_LEADCHNL_RA_SENSING_CATHODE_LOCATION_1: NORMAL
MDC_IDC_SET_LEADCHNL_RA_SENSING_POLARITY: NORMAL
MDC_IDC_SET_LEADCHNL_RA_SENSING_SENSITIVITY: 0.3 MV
MDC_IDC_SET_LEADCHNL_RV_PACING_AMPLITUDE: 2 V
MDC_IDC_SET_LEADCHNL_RV_PACING_ANODE_ELECTRODE_1: NORMAL
MDC_IDC_SET_LEADCHNL_RV_PACING_ANODE_LOCATION_1: NORMAL
MDC_IDC_SET_LEADCHNL_RV_PACING_CAPTURE_MODE: NORMAL
MDC_IDC_SET_LEADCHNL_RV_PACING_CATHODE_ELECTRODE_1: NORMAL
MDC_IDC_SET_LEADCHNL_RV_PACING_CATHODE_LOCATION_1: NORMAL
MDC_IDC_SET_LEADCHNL_RV_PACING_POLARITY: NORMAL
MDC_IDC_SET_LEADCHNL_RV_PACING_PULSEWIDTH: 0.4 MS
MDC_IDC_SET_LEADCHNL_RV_SENSING_ANODE_ELECTRODE_1: NORMAL
MDC_IDC_SET_LEADCHNL_RV_SENSING_ANODE_LOCATION_1: NORMAL
MDC_IDC_SET_LEADCHNL_RV_SENSING_CATHODE_ELECTRODE_1: NORMAL
MDC_IDC_SET_LEADCHNL_RV_SENSING_CATHODE_LOCATION_1: NORMAL
MDC_IDC_SET_LEADCHNL_RV_SENSING_POLARITY: NORMAL
MDC_IDC_SET_LEADCHNL_RV_SENSING_SENSITIVITY: 0.9 MV
MDC_IDC_SET_ZONE_DETECTION_INTERVAL: 200 MS
MDC_IDC_SET_ZONE_DETECTION_INTERVAL: 350 MS
MDC_IDC_SET_ZONE_DETECTION_INTERVAL: 400 MS
MDC_IDC_SET_ZONE_TYPE: NORMAL
MDC_IDC_STAT_AT_BURDEN_PERCENT: 0 %
MDC_IDC_STAT_AT_DTM_END: NORMAL
MDC_IDC_STAT_AT_DTM_START: NORMAL
MDC_IDC_STAT_BRADY_AP_VP_PERCENT: 7.1 %
MDC_IDC_STAT_BRADY_AP_VS_PERCENT: 1.92 %
MDC_IDC_STAT_BRADY_AS_VP_PERCENT: 86.49 %
MDC_IDC_STAT_BRADY_AS_VS_PERCENT: 4.49 %
MDC_IDC_STAT_BRADY_DTM_END: NORMAL
MDC_IDC_STAT_BRADY_DTM_START: NORMAL
MDC_IDC_STAT_BRADY_RA_PERCENT_PACED: 9.01 %
MDC_IDC_STAT_BRADY_RV_PERCENT_PACED: 93.48 %
MDC_IDC_STAT_EPISODE_RECENT_COUNT: 0
MDC_IDC_STAT_EPISODE_RECENT_COUNT: 1
MDC_IDC_STAT_EPISODE_RECENT_COUNT_DTM_END: NORMAL
MDC_IDC_STAT_EPISODE_RECENT_COUNT_DTM_START: NORMAL
MDC_IDC_STAT_EPISODE_TOTAL_COUNT: 0
MDC_IDC_STAT_EPISODE_TOTAL_COUNT: 1380
MDC_IDC_STAT_EPISODE_TOTAL_COUNT_DTM_END: NORMAL
MDC_IDC_STAT_EPISODE_TOTAL_COUNT_DTM_START: NORMAL
MDC_IDC_STAT_EPISODE_TYPE: NORMAL

## 2019-04-25 ENCOUNTER — HOSPITAL ENCOUNTER (EMERGENCY)
Facility: CLINIC | Age: 84
Discharge: HOME | End: 2019-04-25
Attending: EMERGENCY MEDICINE | Admitting: EMERGENCY MEDICINE
Payer: MEDICARE

## 2019-04-25 VITALS
SYSTOLIC BLOOD PRESSURE: 155 MMHG | OXYGEN SATURATION: 96 % | TEMPERATURE: 98.3 F | DIASTOLIC BLOOD PRESSURE: 145 MMHG | HEART RATE: 67 BPM | WEIGHT: 135 LBS | RESPIRATION RATE: 53 BRPM | BODY MASS INDEX: 27.27 KG/M2

## 2019-04-25 DIAGNOSIS — L89.151 PRESSURE INJURY OF SACRAL REGION, STAGE 1: ICD-10-CM

## 2019-04-25 DIAGNOSIS — R10.84 ABDOMINAL PAIN, GENERALIZED: ICD-10-CM

## 2019-04-25 LAB
ALBUMIN SERPL-MCNC: 3.3 G/DL (ref 3.4–5)
ALBUMIN UR-MCNC: NEGATIVE MG/DL
ALP SERPL-CCNC: 92 U/L (ref 40–150)
ALT SERPL W P-5'-P-CCNC: 34 U/L (ref 0–50)
ANION GAP SERPL CALCULATED.3IONS-SCNC: 6 MMOL/L (ref 3–14)
APPEARANCE UR: CLEAR
AST SERPL W P-5'-P-CCNC: 25 U/L (ref 0–45)
BASOPHILS # BLD AUTO: 0 10E9/L (ref 0–0.2)
BASOPHILS NFR BLD AUTO: 0.6 %
BILIRUB SERPL-MCNC: 0.3 MG/DL (ref 0.2–1.3)
BILIRUB UR QL STRIP: NEGATIVE
BUN SERPL-MCNC: 24 MG/DL (ref 7–30)
CALCIUM SERPL-MCNC: 8.8 MG/DL (ref 8.5–10.1)
CHLORIDE SERPL-SCNC: 108 MMOL/L (ref 94–109)
CO2 SERPL-SCNC: 29 MMOL/L (ref 20–32)
COLOR UR AUTO: ABNORMAL
CREAT SERPL-MCNC: 1.07 MG/DL (ref 0.52–1.04)
DIFFERENTIAL METHOD BLD: ABNORMAL
EOSINOPHIL # BLD AUTO: 0.3 10E9/L (ref 0–0.7)
EOSINOPHIL NFR BLD AUTO: 5.3 %
ERYTHROCYTE [DISTWIDTH] IN BLOOD BY AUTOMATED COUNT: 13.2 % (ref 10–15)
GFR SERPL CREATININE-BSD FRML MDRD: 45 ML/MIN/{1.73_M2}
GLUCOSE SERPL-MCNC: 116 MG/DL (ref 70–99)
GLUCOSE UR STRIP-MCNC: NEGATIVE MG/DL
HCT VFR BLD AUTO: 38.6 % (ref 35–47)
HGB BLD-MCNC: 12 G/DL (ref 11.7–15.7)
HGB UR QL STRIP: NEGATIVE
IMM GRANULOCYTES # BLD: 0 10E9/L (ref 0–0.4)
IMM GRANULOCYTES NFR BLD: 0.2 %
KETONES UR STRIP-MCNC: NEGATIVE MG/DL
LACTATE BLD-SCNC: 0.8 MMOL/L (ref 0.7–2)
LEUKOCYTE ESTERASE UR QL STRIP: ABNORMAL
LYMPHOCYTES # BLD AUTO: 1.4 10E9/L (ref 0.8–5.3)
LYMPHOCYTES NFR BLD AUTO: 27 %
MCH RBC QN AUTO: 28.4 PG (ref 26.5–33)
MCHC RBC AUTO-ENTMCNC: 31.1 G/DL (ref 31.5–36.5)
MCV RBC AUTO: 91 FL (ref 78–100)
MONOCYTES # BLD AUTO: 0.5 10E9/L (ref 0–1.3)
MONOCYTES NFR BLD AUTO: 8.6 %
NEUTROPHILS # BLD AUTO: 3.1 10E9/L (ref 1.6–8.3)
NEUTROPHILS NFR BLD AUTO: 58.3 %
NITRATE UR QL: NEGATIVE
NRBC # BLD AUTO: 0 10*3/UL
NRBC BLD AUTO-RTO: 0 /100
PH UR STRIP: 7 PH (ref 5–7)
PLATELET # BLD AUTO: 180 10E9/L (ref 150–450)
POTASSIUM SERPL-SCNC: 3.7 MMOL/L (ref 3.4–5.3)
PROT SERPL-MCNC: 6.1 G/DL (ref 6.8–8.8)
RBC # BLD AUTO: 4.23 10E12/L (ref 3.8–5.2)
RBC #/AREA URNS AUTO: 0 /HPF (ref 0–2)
SODIUM SERPL-SCNC: 143 MMOL/L (ref 133–144)
SOURCE: ABNORMAL
SP GR UR STRIP: 1.01 (ref 1–1.03)
SQUAMOUS #/AREA URNS AUTO: <1 /HPF (ref 0–1)
TROPONIN I SERPL-MCNC: <0.015 UG/L (ref 0–0.04)
UROBILINOGEN UR STRIP-MCNC: 0 MG/DL (ref 0–2)
WBC # BLD AUTO: 5.3 10E9/L (ref 4–11)
WBC #/AREA URNS AUTO: 7 /HPF (ref 0–5)

## 2019-04-25 PROCEDURE — 99283 EMERGENCY DEPT VISIT LOW MDM: CPT | Performed by: EMERGENCY MEDICINE

## 2019-04-25 PROCEDURE — 81001 URINALYSIS AUTO W/SCOPE: CPT | Performed by: EMERGENCY MEDICINE

## 2019-04-25 PROCEDURE — 87040 BLOOD CULTURE FOR BACTERIA: CPT | Performed by: EMERGENCY MEDICINE

## 2019-04-25 PROCEDURE — 36415 COLL VENOUS BLD VENIPUNCTURE: CPT | Performed by: EMERGENCY MEDICINE

## 2019-04-25 PROCEDURE — 83605 ASSAY OF LACTIC ACID: CPT | Performed by: EMERGENCY MEDICINE

## 2019-04-25 PROCEDURE — 87086 URINE CULTURE/COLONY COUNT: CPT | Performed by: EMERGENCY MEDICINE

## 2019-04-25 PROCEDURE — 84484 ASSAY OF TROPONIN QUANT: CPT | Performed by: EMERGENCY MEDICINE

## 2019-04-25 PROCEDURE — 85025 COMPLETE CBC W/AUTO DIFF WBC: CPT | Performed by: EMERGENCY MEDICINE

## 2019-04-25 PROCEDURE — 80053 COMPREHEN METABOLIC PANEL: CPT | Performed by: EMERGENCY MEDICINE

## 2019-04-25 PROCEDURE — 99284 EMERGENCY DEPT VISIT MOD MDM: CPT | Mod: Z6 | Performed by: EMERGENCY MEDICINE

## 2019-04-25 RX ORDER — CHLORDIAZEPOXIDE HYDROCHLORIDE 10 MG/1
10 CAPSULE, GELATIN COATED ORAL ONCE
Status: DISCONTINUED | OUTPATIENT
Start: 2019-04-25 | End: 2019-04-25 | Stop reason: RX

## 2019-04-25 RX ORDER — FERROUS GLUCONATE 324(38)MG
324 TABLET ORAL 2 TIMES DAILY WITH MEALS
Status: ON HOLD | COMMUNITY
Start: 2019-04-04 | End: 2019-12-31

## 2019-04-25 RX ORDER — PRIMIDONE 50 MG/1
50 TABLET ORAL 2 TIMES DAILY
Refills: 2 | COMMUNITY
Start: 2019-03-26 | End: 2019-12-05

## 2019-04-25 NOTE — ED AVS SNAPSHOT
[facility]  [address] [address]  Phone:  [phone]  Fax:  [phone]                                    Michaela Soria   MRN: [id number]    Department:  [facility]   Date of Visit:  4/25/2019           After Visit Summary Signature Page    I have received my discharge instructions, and my questions have been answered. I have discussed any challenges I see with this plan with the nurse or doctor.    ..........................................................................................................................................  Patient/Patient Representative Signature      ..........................................................................................................................................  Patient Representative Print Name and Relationship to Patient    ..................................................               ................................................  Date                                   Time    ..........................................................................................................................................  Reviewed by Signature/Title    ...................................................              ..............................................  Date                                               Time          22EPIC Rev 08/18
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
## 2019-04-25 NOTE — DISCHARGE INSTRUCTIONS
Continue current medication regimen, follow-up primary care for further evaluation.  Return here for fever greater than 100.4, vomiting, chest or abdominal pain or any other concerns.    Await urine culture results, we will call if significant growth and recommend antibiotic at that time.

## 2019-04-26 LAB
BACTERIA SPEC CULT: NO GROWTH
Lab: NORMAL
SPECIMEN SOURCE: NORMAL

## 2019-05-01 LAB
BACTERIA SPEC CULT: NO GROWTH
Lab: NORMAL
SPECIMEN SOURCE: NORMAL

## 2019-05-01 NOTE — RESULT ENCOUNTER NOTE
Final blood culture report is NEGATIVE.    No treatment or change in treatment per [facility] ED Lab Result protocol.

## 2019-05-16 ENCOUNTER — ANCILLARY PROCEDURE (OUTPATIENT)
Dept: CARDIOLOGY | Facility: CLINIC | Age: 84
End: 2019-05-16
Attending: INTERNAL MEDICINE
Payer: MEDICARE

## 2019-05-16 DIAGNOSIS — I44.30 AV BLOCK: ICD-10-CM

## 2019-05-16 PROCEDURE — 93294 REM INTERROG EVL PM/LDLS PM: CPT | Performed by: INTERNAL MEDICINE

## 2019-05-16 PROCEDURE — 93296 REM INTERROG EVL PM/IDS: CPT | Mod: ZF

## 2019-06-07 LAB
MDC_IDC_EPISODE_DTM: NORMAL
MDC_IDC_EPISODE_DURATION: 1028 S
MDC_IDC_EPISODE_DURATION: 103 S
MDC_IDC_EPISODE_DURATION: 1042 S
MDC_IDC_EPISODE_DURATION: 1084 S
MDC_IDC_EPISODE_DURATION: 109 S
MDC_IDC_EPISODE_DURATION: 1183 S
MDC_IDC_EPISODE_DURATION: 1267 S
MDC_IDC_EPISODE_DURATION: 135 S
MDC_IDC_EPISODE_DURATION: 1482 S
MDC_IDC_EPISODE_DURATION: 150 S
MDC_IDC_EPISODE_DURATION: 160 S
MDC_IDC_EPISODE_DURATION: 161 S
MDC_IDC_EPISODE_DURATION: 1661 S
MDC_IDC_EPISODE_DURATION: 1687 S
MDC_IDC_EPISODE_DURATION: 1722 S
MDC_IDC_EPISODE_DURATION: 1848 S
MDC_IDC_EPISODE_DURATION: 187 S
MDC_IDC_EPISODE_DURATION: 2014 S
MDC_IDC_EPISODE_DURATION: 204 S
MDC_IDC_EPISODE_DURATION: 221 S
MDC_IDC_EPISODE_DURATION: 223 S
MDC_IDC_EPISODE_DURATION: 2306 S
MDC_IDC_EPISODE_DURATION: 231 S
MDC_IDC_EPISODE_DURATION: 237 S
MDC_IDC_EPISODE_DURATION: 240 S
MDC_IDC_EPISODE_DURATION: 2571 S
MDC_IDC_EPISODE_DURATION: 2839 S
MDC_IDC_EPISODE_DURATION: 287 S
MDC_IDC_EPISODE_DURATION: 288 S
MDC_IDC_EPISODE_DURATION: 288 S
MDC_IDC_EPISODE_DURATION: 295 S
MDC_IDC_EPISODE_DURATION: 31 S
MDC_IDC_EPISODE_DURATION: 3182 S
MDC_IDC_EPISODE_DURATION: 33 S
MDC_IDC_EPISODE_DURATION: 330 S
MDC_IDC_EPISODE_DURATION: 3315 S
MDC_IDC_EPISODE_DURATION: 335 S
MDC_IDC_EPISODE_DURATION: 34 S
MDC_IDC_EPISODE_DURATION: 36 S
MDC_IDC_EPISODE_DURATION: 406 S
MDC_IDC_EPISODE_DURATION: 41 S
MDC_IDC_EPISODE_DURATION: 421 S
MDC_IDC_EPISODE_DURATION: 43 S
MDC_IDC_EPISODE_DURATION: 44 S
MDC_IDC_EPISODE_DURATION: 4610 S
MDC_IDC_EPISODE_DURATION: 463 S
MDC_IDC_EPISODE_DURATION: 47 S
MDC_IDC_EPISODE_DURATION: 47 S
MDC_IDC_EPISODE_DURATION: 4741 S
MDC_IDC_EPISODE_DURATION: 479 S
MDC_IDC_EPISODE_DURATION: 4863 S
MDC_IDC_EPISODE_DURATION: 51 S
MDC_IDC_EPISODE_DURATION: 52 S
MDC_IDC_EPISODE_DURATION: 54 S
MDC_IDC_EPISODE_DURATION: 55 S
MDC_IDC_EPISODE_DURATION: 565 S
MDC_IDC_EPISODE_DURATION: 60 S
MDC_IDC_EPISODE_DURATION: 61 S
MDC_IDC_EPISODE_DURATION: 631 S
MDC_IDC_EPISODE_DURATION: 65 S
MDC_IDC_EPISODE_DURATION: 68 S
MDC_IDC_EPISODE_DURATION: 69 S
MDC_IDC_EPISODE_DURATION: 76 S
MDC_IDC_EPISODE_DURATION: 760 S
MDC_IDC_EPISODE_DURATION: 77 S
MDC_IDC_EPISODE_DURATION: 78 S
MDC_IDC_EPISODE_DURATION: 796 S
MDC_IDC_EPISODE_DURATION: 82 S
MDC_IDC_EPISODE_DURATION: 8326 S
MDC_IDC_EPISODE_DURATION: 86 S
MDC_IDC_EPISODE_DURATION: 877 S
MDC_IDC_EPISODE_DURATION: 89 S
MDC_IDC_EPISODE_DURATION: 90 S
MDC_IDC_EPISODE_DURATION: 92 S
MDC_IDC_EPISODE_DURATION: 95 S
MDC_IDC_EPISODE_DURATION: 99 S
MDC_IDC_EPISODE_DURATION: NORMAL S
MDC_IDC_EPISODE_ID: 1000
MDC_IDC_EPISODE_ID: 1001
MDC_IDC_EPISODE_ID: 1002
MDC_IDC_EPISODE_ID: 1003
MDC_IDC_EPISODE_ID: 1004
MDC_IDC_EPISODE_ID: 1005
MDC_IDC_EPISODE_ID: 1006
MDC_IDC_EPISODE_ID: 1007
MDC_IDC_EPISODE_ID: 1008
MDC_IDC_EPISODE_ID: 1009
MDC_IDC_EPISODE_ID: 1010
MDC_IDC_EPISODE_ID: 1011
MDC_IDC_EPISODE_ID: 1012
MDC_IDC_EPISODE_ID: 1013
MDC_IDC_EPISODE_ID: 1014
MDC_IDC_EPISODE_ID: 1015
MDC_IDC_EPISODE_ID: 1016
MDC_IDC_EPISODE_ID: 1017
MDC_IDC_EPISODE_ID: 1018
MDC_IDC_EPISODE_ID: 1019
MDC_IDC_EPISODE_ID: 1020
MDC_IDC_EPISODE_ID: 1021
MDC_IDC_EPISODE_ID: 1022
MDC_IDC_EPISODE_ID: 1023
MDC_IDC_EPISODE_ID: 1024
MDC_IDC_EPISODE_ID: 1025
MDC_IDC_EPISODE_ID: 1026
MDC_IDC_EPISODE_ID: 1027
MDC_IDC_EPISODE_ID: 1028
MDC_IDC_EPISODE_ID: 1029
MDC_IDC_EPISODE_ID: 1030
MDC_IDC_EPISODE_ID: 1031
MDC_IDC_EPISODE_ID: 1032
MDC_IDC_EPISODE_ID: 1033
MDC_IDC_EPISODE_ID: 1034
MDC_IDC_EPISODE_ID: 1035
MDC_IDC_EPISODE_ID: 959
MDC_IDC_EPISODE_ID: 960
MDC_IDC_EPISODE_ID: 961
MDC_IDC_EPISODE_ID: 962
MDC_IDC_EPISODE_ID: 963
MDC_IDC_EPISODE_ID: 964
MDC_IDC_EPISODE_ID: 965
MDC_IDC_EPISODE_ID: 966
MDC_IDC_EPISODE_ID: 967
MDC_IDC_EPISODE_ID: 968
MDC_IDC_EPISODE_ID: 969
MDC_IDC_EPISODE_ID: 970
MDC_IDC_EPISODE_ID: 971
MDC_IDC_EPISODE_ID: 972
MDC_IDC_EPISODE_ID: 973
MDC_IDC_EPISODE_ID: 974
MDC_IDC_EPISODE_ID: 975
MDC_IDC_EPISODE_ID: 976
MDC_IDC_EPISODE_ID: 977
MDC_IDC_EPISODE_ID: 978
MDC_IDC_EPISODE_ID: 979
MDC_IDC_EPISODE_ID: 980
MDC_IDC_EPISODE_ID: 981
MDC_IDC_EPISODE_ID: 982
MDC_IDC_EPISODE_ID: 983
MDC_IDC_EPISODE_ID: 984
MDC_IDC_EPISODE_ID: 985
MDC_IDC_EPISODE_ID: 986
MDC_IDC_EPISODE_ID: 987
MDC_IDC_EPISODE_ID: 988
MDC_IDC_EPISODE_ID: 989
MDC_IDC_EPISODE_ID: 990
MDC_IDC_EPISODE_ID: 991
MDC_IDC_EPISODE_ID: 992
MDC_IDC_EPISODE_ID: 993
MDC_IDC_EPISODE_ID: 994
MDC_IDC_EPISODE_ID: 995
MDC_IDC_EPISODE_ID: 996
MDC_IDC_EPISODE_ID: 997
MDC_IDC_EPISODE_ID: 998
MDC_IDC_EPISODE_ID: 999
MDC_IDC_EPISODE_TYPE: NORMAL
MDC_IDC_LEAD_IMPLANT_DT: NORMAL
MDC_IDC_LEAD_IMPLANT_DT: NORMAL
MDC_IDC_LEAD_LOCATION: NORMAL
MDC_IDC_LEAD_LOCATION: NORMAL
MDC_IDC_LEAD_LOCATION_DETAIL_1: NORMAL
MDC_IDC_LEAD_LOCATION_DETAIL_1: NORMAL
MDC_IDC_LEAD_MFG: NORMAL
MDC_IDC_LEAD_MFG: NORMAL
MDC_IDC_LEAD_MODEL: NORMAL
MDC_IDC_LEAD_MODEL: NORMAL
MDC_IDC_LEAD_POLARITY_TYPE: NORMAL
MDC_IDC_LEAD_POLARITY_TYPE: NORMAL
MDC_IDC_LEAD_SERIAL: NORMAL
MDC_IDC_LEAD_SERIAL: NORMAL
MDC_IDC_MSMT_BATTERY_DTM: NORMAL
MDC_IDC_MSMT_BATTERY_REMAINING_LONGEVITY: 44 MO
MDC_IDC_MSMT_BATTERY_RRT_TRIGGER: 2.83
MDC_IDC_MSMT_BATTERY_STATUS: NORMAL
MDC_IDC_MSMT_BATTERY_VOLTAGE: 2.98 V
MDC_IDC_MSMT_LEADCHNL_RA_IMPEDANCE_VALUE: 323 OHM
MDC_IDC_MSMT_LEADCHNL_RA_IMPEDANCE_VALUE: 380 OHM
MDC_IDC_MSMT_LEADCHNL_RA_PACING_THRESHOLD_AMPLITUDE: 0.62 V
MDC_IDC_MSMT_LEADCHNL_RA_PACING_THRESHOLD_PULSEWIDTH: 0.4 MS
MDC_IDC_MSMT_LEADCHNL_RA_SENSING_INTR_AMPL: 2.12 MV
MDC_IDC_MSMT_LEADCHNL_RA_SENSING_INTR_AMPL: 2.12 MV
MDC_IDC_MSMT_LEADCHNL_RV_IMPEDANCE_VALUE: 342 OHM
MDC_IDC_MSMT_LEADCHNL_RV_IMPEDANCE_VALUE: 418 OHM
MDC_IDC_MSMT_LEADCHNL_RV_PACING_THRESHOLD_AMPLITUDE: 0.88 V
MDC_IDC_MSMT_LEADCHNL_RV_PACING_THRESHOLD_PULSEWIDTH: 0.4 MS
MDC_IDC_MSMT_LEADCHNL_RV_SENSING_INTR_AMPL: 9 MV
MDC_IDC_MSMT_LEADCHNL_RV_SENSING_INTR_AMPL: 9 MV
MDC_IDC_PG_IMPLANT_DTM: NORMAL
MDC_IDC_PG_MFG: NORMAL
MDC_IDC_PG_MODEL: NORMAL
MDC_IDC_PG_SERIAL: NORMAL
MDC_IDC_PG_TYPE: NORMAL
MDC_IDC_SESS_CLINIC_NAME: NORMAL
MDC_IDC_SESS_DTM: NORMAL
MDC_IDC_SESS_TYPE: NORMAL
MDC_IDC_SET_BRADY_AT_MODE_SWITCH_RATE: 171 {BEATS}/MIN
MDC_IDC_SET_BRADY_HYSTRATE: NORMAL
MDC_IDC_SET_BRADY_LOWRATE: 60 {BEATS}/MIN
MDC_IDC_SET_BRADY_MAX_SENSOR_RATE: 120 {BEATS}/MIN
MDC_IDC_SET_BRADY_MAX_TRACKING_RATE: 120 {BEATS}/MIN
MDC_IDC_SET_BRADY_MODE: NORMAL
MDC_IDC_SET_BRADY_PAV_DELAY_LOW: 180 MS
MDC_IDC_SET_BRADY_SAV_DELAY_LOW: 150 MS
MDC_IDC_SET_LEADCHNL_RA_PACING_AMPLITUDE: 1.5 V
MDC_IDC_SET_LEADCHNL_RA_PACING_ANODE_ELECTRODE_1: NORMAL
MDC_IDC_SET_LEADCHNL_RA_PACING_ANODE_LOCATION_1: NORMAL
MDC_IDC_SET_LEADCHNL_RA_PACING_CAPTURE_MODE: NORMAL
MDC_IDC_SET_LEADCHNL_RA_PACING_CATHODE_ELECTRODE_1: NORMAL
MDC_IDC_SET_LEADCHNL_RA_PACING_CATHODE_LOCATION_1: NORMAL
MDC_IDC_SET_LEADCHNL_RA_PACING_POLARITY: NORMAL
MDC_IDC_SET_LEADCHNL_RA_PACING_PULSEWIDTH: 0.4 MS
MDC_IDC_SET_LEADCHNL_RA_SENSING_ANODE_ELECTRODE_1: NORMAL
MDC_IDC_SET_LEADCHNL_RA_SENSING_ANODE_LOCATION_1: NORMAL
MDC_IDC_SET_LEADCHNL_RA_SENSING_CATHODE_ELECTRODE_1: NORMAL
MDC_IDC_SET_LEADCHNL_RA_SENSING_CATHODE_LOCATION_1: NORMAL
MDC_IDC_SET_LEADCHNL_RA_SENSING_POLARITY: NORMAL
MDC_IDC_SET_LEADCHNL_RA_SENSING_SENSITIVITY: 0.3 MV
MDC_IDC_SET_LEADCHNL_RV_PACING_AMPLITUDE: 2 V
MDC_IDC_SET_LEADCHNL_RV_PACING_ANODE_ELECTRODE_1: NORMAL
MDC_IDC_SET_LEADCHNL_RV_PACING_ANODE_LOCATION_1: NORMAL
MDC_IDC_SET_LEADCHNL_RV_PACING_CAPTURE_MODE: NORMAL
MDC_IDC_SET_LEADCHNL_RV_PACING_CATHODE_ELECTRODE_1: NORMAL
MDC_IDC_SET_LEADCHNL_RV_PACING_CATHODE_LOCATION_1: NORMAL
MDC_IDC_SET_LEADCHNL_RV_PACING_POLARITY: NORMAL
MDC_IDC_SET_LEADCHNL_RV_PACING_PULSEWIDTH: 0.4 MS
MDC_IDC_SET_LEADCHNL_RV_SENSING_ANODE_ELECTRODE_1: NORMAL
MDC_IDC_SET_LEADCHNL_RV_SENSING_ANODE_LOCATION_1: NORMAL
MDC_IDC_SET_LEADCHNL_RV_SENSING_CATHODE_ELECTRODE_1: NORMAL
MDC_IDC_SET_LEADCHNL_RV_SENSING_CATHODE_LOCATION_1: NORMAL
MDC_IDC_SET_LEADCHNL_RV_SENSING_POLARITY: NORMAL
MDC_IDC_SET_LEADCHNL_RV_SENSING_SENSITIVITY: 0.9 MV
MDC_IDC_SET_ZONE_DETECTION_INTERVAL: 200 MS
MDC_IDC_SET_ZONE_DETECTION_INTERVAL: 350 MS
MDC_IDC_SET_ZONE_DETECTION_INTERVAL: 400 MS
MDC_IDC_SET_ZONE_TYPE: NORMAL
MDC_IDC_STAT_AT_BURDEN_PERCENT: 1 %
MDC_IDC_STAT_AT_DTM_END: NORMAL
MDC_IDC_STAT_AT_DTM_START: NORMAL
MDC_IDC_STAT_BRADY_AP_VP_PERCENT: 14.91 %
MDC_IDC_STAT_BRADY_AP_VS_PERCENT: 5.51 %
MDC_IDC_STAT_BRADY_AS_VP_PERCENT: 74.05 %
MDC_IDC_STAT_BRADY_AS_VS_PERCENT: 5.54 %
MDC_IDC_STAT_BRADY_DTM_END: NORMAL
MDC_IDC_STAT_BRADY_DTM_START: NORMAL
MDC_IDC_STAT_BRADY_RA_PERCENT_PACED: 20.26 %
MDC_IDC_STAT_BRADY_RV_PERCENT_PACED: 88.73 %
MDC_IDC_STAT_EPISODE_RECENT_COUNT: 0
MDC_IDC_STAT_EPISODE_RECENT_COUNT: 77
MDC_IDC_STAT_EPISODE_RECENT_COUNT_DTM_END: NORMAL
MDC_IDC_STAT_EPISODE_RECENT_COUNT_DTM_START: NORMAL
MDC_IDC_STAT_EPISODE_TOTAL_COUNT: 0
MDC_IDC_STAT_EPISODE_TOTAL_COUNT: 1509
MDC_IDC_STAT_EPISODE_TOTAL_COUNT_DTM_END: NORMAL
MDC_IDC_STAT_EPISODE_TOTAL_COUNT_DTM_START: NORMAL
MDC_IDC_STAT_EPISODE_TYPE: NORMAL

## 2019-09-30 ENCOUNTER — TELEPHONE (OUTPATIENT)
Dept: CARDIOLOGY | Facility: CLINIC | Age: 84
End: 2019-09-30

## 2019-09-30 NOTE — TELEPHONE ENCOUNTER
Health Call Center    Phone Message    May a detailed message be left on voicemail: yes    Reason for Call: Other: Tessa would like to know if Dr. Smith would still like her to run a device check on Michaela.  They are not able to bring her into clinic.  Futhermore, Tessa states that she may not be able to run the device until the last week of October due to Michaela' health and personal conflicts/schedules.  Is there any objection to waiting for the device check?     Action Taken: Message routed to:  [facility] ([facility] cardiology

## 2019-09-30 NOTE — TELEPHONE ENCOUNTER
Spoke with patient's daughter, Tessa by phone.  Due to the patient's health and transportation needs it is very difficult for them to get the patient to the clinic.  Tessa will send a remote device interrogation the last week of October.  Interrogation will be reviewed at that time.  Tessa states understanding and agrees to call with any further questions or concerns.

## 2019-10-01 ENCOUNTER — HOSPITAL ENCOUNTER (EMERGENCY)
Facility: CLINIC | Age: 84
Discharge: HOME | End: 2019-10-01
Attending: FAMILY MEDICINE | Admitting: FAMILY MEDICINE
Payer: MEDICARE

## 2019-10-01 VITALS
SYSTOLIC BLOOD PRESSURE: 108 MMHG | BODY MASS INDEX: 26.26 KG/M2 | HEART RATE: 68 BPM | TEMPERATURE: 98.3 F | WEIGHT: 130 LBS | OXYGEN SATURATION: 97 % | DIASTOLIC BLOOD PRESSURE: 76 MMHG

## 2019-10-01 DIAGNOSIS — R41.0 CONFUSION: ICD-10-CM

## 2019-10-01 DIAGNOSIS — R41.3 MEMORY LOSS: ICD-10-CM

## 2019-10-01 LAB
ALBUMIN SERPL-MCNC: 3.7 G/DL (ref 3.4–5)
ALBUMIN UR-MCNC: NEGATIVE MG/DL
ALP SERPL-CCNC: 91 U/L (ref 40–150)
ALT SERPL W P-5'-P-CCNC: 28 U/L (ref 0–50)
AMMONIA PLAS-SCNC: NORMAL UMOL/L (ref 10–50)
ANION GAP SERPL CALCULATED.3IONS-SCNC: 8 MMOL/L (ref 3–14)
APPEARANCE UR: CLEAR
AST SERPL W P-5'-P-CCNC: 28 U/L (ref 0–45)
BASOPHILS # BLD AUTO: 0 10E9/L (ref 0–0.2)
BASOPHILS NFR BLD AUTO: 0.6 %
BILIRUB SERPL-MCNC: 0.3 MG/DL (ref 0.2–1.3)
BILIRUB UR QL STRIP: NEGATIVE
BUN SERPL-MCNC: 25 MG/DL (ref 7–30)
CALCIUM SERPL-MCNC: 9.3 MG/DL (ref 8.5–10.1)
CHLORIDE SERPL-SCNC: 106 MMOL/L (ref 94–109)
CO2 SERPL-SCNC: 27 MMOL/L (ref 20–32)
COLOR UR AUTO: ABNORMAL
CREAT SERPL-MCNC: 1 MG/DL (ref 0.52–1.04)
DIFFERENTIAL METHOD BLD: NORMAL
EOSINOPHIL # BLD AUTO: 0.2 10E9/L (ref 0–0.7)
EOSINOPHIL NFR BLD AUTO: 3.1 %
ERYTHROCYTE [DISTWIDTH] IN BLOOD BY AUTOMATED COUNT: 13.8 % (ref 10–15)
GFR SERPL CREATININE-BSD FRML MDRD: 49 ML/MIN/{1.73_M2}
GLUCOSE SERPL-MCNC: 108 MG/DL (ref 70–99)
GLUCOSE UR STRIP-MCNC: 50 MG/DL
HCT VFR BLD AUTO: 42.3 % (ref 35–47)
HGB BLD-MCNC: 13.4 G/DL (ref 11.7–15.7)
HGB UR QL STRIP: NEGATIVE
IMM GRANULOCYTES # BLD: 0 10E9/L (ref 0–0.4)
IMM GRANULOCYTES NFR BLD: 0.3 %
KETONES UR STRIP-MCNC: NEGATIVE MG/DL
LEUKOCYTE ESTERASE UR QL STRIP: ABNORMAL
LYMPHOCYTES # BLD AUTO: 1.8 10E9/L (ref 0.8–5.3)
LYMPHOCYTES NFR BLD AUTO: 25.5 %
MCH RBC QN AUTO: 28.6 PG (ref 26.5–33)
MCHC RBC AUTO-ENTMCNC: 31.7 G/DL (ref 31.5–36.5)
MCV RBC AUTO: 90 FL (ref 78–100)
MONOCYTES # BLD AUTO: 0.7 10E9/L (ref 0–1.3)
MONOCYTES NFR BLD AUTO: 9.4 %
MUCOUS THREADS #/AREA URNS LPF: PRESENT /LPF
NEUTROPHILS # BLD AUTO: 4.3 10E9/L (ref 1.6–8.3)
NEUTROPHILS NFR BLD AUTO: 61.1 %
NITRATE UR QL: NEGATIVE
NRBC # BLD AUTO: 0 10*3/UL
NRBC BLD AUTO-RTO: 0 /100
PH UR STRIP: 6 PH (ref 5–7)
PLATELET # BLD AUTO: 180 10E9/L (ref 150–450)
POTASSIUM SERPL-SCNC: 4.3 MMOL/L (ref 3.4–5.3)
PROT SERPL-MCNC: 6.9 G/DL (ref 6.8–8.8)
RBC # BLD AUTO: 4.69 10E12/L (ref 3.8–5.2)
RBC #/AREA URNS AUTO: <1 /HPF (ref 0–2)
SODIUM SERPL-SCNC: 141 MMOL/L (ref 133–144)
SOURCE: ABNORMAL
SP GR UR STRIP: 1.01 (ref 1–1.03)
SQUAMOUS #/AREA URNS AUTO: <1 /HPF (ref 0–1)
T4 FREE SERPL-MCNC: 0.79 NG/DL (ref 0.76–1.46)
TSH SERPL DL<=0.005 MIU/L-ACNC: 4.47 MU/L (ref 0.4–4)
UROBILINOGEN UR STRIP-MCNC: 0 MG/DL (ref 0–2)
WBC # BLD AUTO: 7.1 10E9/L (ref 4–11)
WBC #/AREA URNS AUTO: 1 /HPF (ref 0–5)

## 2019-10-01 PROCEDURE — 84443 ASSAY THYROID STIM HORMONE: CPT | Performed by: FAMILY MEDICINE

## 2019-10-01 PROCEDURE — 99284 EMERGENCY DEPT VISIT MOD MDM: CPT | Performed by: FAMILY MEDICINE

## 2019-10-01 PROCEDURE — 85025 COMPLETE CBC W/AUTO DIFF WBC: CPT | Performed by: FAMILY MEDICINE

## 2019-10-01 PROCEDURE — 80053 COMPREHEN METABOLIC PANEL: CPT | Performed by: FAMILY MEDICINE

## 2019-10-01 PROCEDURE — 93005 ELECTROCARDIOGRAM TRACING: CPT | Performed by: FAMILY MEDICINE

## 2019-10-01 PROCEDURE — 84439 ASSAY OF FREE THYROXINE: CPT | Performed by: FAMILY MEDICINE

## 2019-10-01 PROCEDURE — 93010 ELECTROCARDIOGRAM REPORT: CPT | Mod: Z6 | Performed by: FAMILY MEDICINE

## 2019-10-01 PROCEDURE — 81001 URINALYSIS AUTO W/SCOPE: CPT | Performed by: FAMILY MEDICINE

## 2019-10-01 PROCEDURE — 82140 ASSAY OF AMMONIA: CPT | Performed by: FAMILY MEDICINE

## 2019-10-01 PROCEDURE — 99285 EMERGENCY DEPT VISIT HI MDM: CPT | Mod: 25 | Performed by: FAMILY MEDICINE

## 2019-10-01 NOTE — ED AVS SNAPSHOT
[facility]  [address] [address]  Phone:  [phone]  Fax:  [phone]                                    Michaela Soria   MRN: [id number]    Department:  [facility]   Date of Visit:  10/1/2019           After Visit Summary Signature Page    I have received my discharge instructions, and my questions have been answered. I have discussed any challenges I see with this plan with the nurse or doctor.    ..........................................................................................................................................  Patient/Patient Representative Signature      ..........................................................................................................................................  Patient Representative Print Name and Relationship to Patient    ..................................................               ................................................  Date                                   Time    ..........................................................................................................................................  Reviewed by Signature/Title    ...................................................              ..............................................  Date                                               Time          22EPIC Rev 08/18
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
## 2019-10-01 NOTE — DISCHARGE INSTRUCTIONS
Return to the Emergency Room if the following occurs:     Severely worsened confusion, violent, fever >101, or for any concern at anytime.    Or, follow-up with the following provider as we discussed:     Return to your primary doctor later this week or next for reevaluation.    Medications discussed:    Stop Primidone.  Stop propanolol.    If you received pain-relieving or sedating medication during your time in the ER, avoid alcohol, driving automobiles, or working with machinery.  Also, a responsible adult must stay with you.        Call the Nurse Advice Line at [phone] or [phone] for any concern at anytime.

## 2019-10-01 NOTE — ED NOTES
Son states that ever since pt started on propanolol pt has been very confused.  Pt has no complaints.  Pt able to communicate needs to staff and follows commands.  Denies recent illness, fevers, ha's.  No urinary symptoms.

## 2019-10-28 ENCOUNTER — ANCILLARY PROCEDURE (OUTPATIENT)
Dept: CARDIOLOGY | Facility: CLINIC | Age: 84
End: 2019-10-28
Attending: INTERNAL MEDICINE
Payer: MEDICARE

## 2019-10-28 DIAGNOSIS — I44.30 AV BLOCK: ICD-10-CM

## 2019-10-28 LAB
MDC_IDC_EPISODE_DTM: NORMAL
MDC_IDC_EPISODE_DURATION: 1 S
MDC_IDC_EPISODE_ID: 1036
MDC_IDC_EPISODE_TYPE: NORMAL
MDC_IDC_LEAD_IMPLANT_DT: NORMAL
MDC_IDC_LEAD_IMPLANT_DT: NORMAL
MDC_IDC_LEAD_LOCATION: NORMAL
MDC_IDC_LEAD_LOCATION: NORMAL
MDC_IDC_LEAD_LOCATION_DETAIL_1: NORMAL
MDC_IDC_LEAD_LOCATION_DETAIL_1: NORMAL
MDC_IDC_LEAD_MFG: NORMAL
MDC_IDC_LEAD_MFG: NORMAL
MDC_IDC_LEAD_MODEL: NORMAL
MDC_IDC_LEAD_MODEL: NORMAL
MDC_IDC_LEAD_POLARITY_TYPE: NORMAL
MDC_IDC_LEAD_POLARITY_TYPE: NORMAL
MDC_IDC_LEAD_SERIAL: NORMAL
MDC_IDC_LEAD_SERIAL: NORMAL
MDC_IDC_MSMT_BATTERY_DTM: NORMAL
MDC_IDC_MSMT_BATTERY_REMAINING_LONGEVITY: 42 MO
MDC_IDC_MSMT_BATTERY_RRT_TRIGGER: 2.83
MDC_IDC_MSMT_BATTERY_STATUS: NORMAL
MDC_IDC_MSMT_BATTERY_VOLTAGE: 2.97 V
MDC_IDC_MSMT_LEADCHNL_RA_IMPEDANCE_VALUE: 342 OHM
MDC_IDC_MSMT_LEADCHNL_RA_IMPEDANCE_VALUE: 380 OHM
MDC_IDC_MSMT_LEADCHNL_RA_PACING_THRESHOLD_AMPLITUDE: 0.5 V
MDC_IDC_MSMT_LEADCHNL_RA_PACING_THRESHOLD_PULSEWIDTH: 0.4 MS
MDC_IDC_MSMT_LEADCHNL_RA_SENSING_INTR_AMPL: 2.12 MV
MDC_IDC_MSMT_LEADCHNL_RA_SENSING_INTR_AMPL: 2.12 MV
MDC_IDC_MSMT_LEADCHNL_RV_IMPEDANCE_VALUE: 361 OHM
MDC_IDC_MSMT_LEADCHNL_RV_IMPEDANCE_VALUE: 418 OHM
MDC_IDC_MSMT_LEADCHNL_RV_PACING_THRESHOLD_AMPLITUDE: 0.75 V
MDC_IDC_MSMT_LEADCHNL_RV_PACING_THRESHOLD_PULSEWIDTH: 0.4 MS
MDC_IDC_MSMT_LEADCHNL_RV_SENSING_INTR_AMPL: 28.25 MV
MDC_IDC_MSMT_LEADCHNL_RV_SENSING_INTR_AMPL: 28.25 MV
MDC_IDC_PG_IMPLANT_DTM: NORMAL
MDC_IDC_PG_MFG: NORMAL
MDC_IDC_PG_MODEL: NORMAL
MDC_IDC_PG_SERIAL: NORMAL
MDC_IDC_PG_TYPE: NORMAL
MDC_IDC_SESS_CLINIC_NAME: NORMAL
MDC_IDC_SESS_DTM: NORMAL
MDC_IDC_SESS_TYPE: NORMAL
MDC_IDC_SET_BRADY_AT_MODE_SWITCH_RATE: 171 {BEATS}/MIN
MDC_IDC_SET_BRADY_HYSTRATE: NORMAL
MDC_IDC_SET_BRADY_LOWRATE: 60 {BEATS}/MIN
MDC_IDC_SET_BRADY_MAX_SENSOR_RATE: 120 {BEATS}/MIN
MDC_IDC_SET_BRADY_MAX_TRACKING_RATE: 120 {BEATS}/MIN
MDC_IDC_SET_BRADY_MODE: NORMAL
MDC_IDC_SET_BRADY_PAV_DELAY_LOW: 180 MS
MDC_IDC_SET_BRADY_SAV_DELAY_LOW: 150 MS
MDC_IDC_SET_LEADCHNL_RA_PACING_AMPLITUDE: 1.5 V
MDC_IDC_SET_LEADCHNL_RA_PACING_ANODE_ELECTRODE_1: NORMAL
MDC_IDC_SET_LEADCHNL_RA_PACING_ANODE_LOCATION_1: NORMAL
MDC_IDC_SET_LEADCHNL_RA_PACING_CAPTURE_MODE: NORMAL
MDC_IDC_SET_LEADCHNL_RA_PACING_CATHODE_ELECTRODE_1: NORMAL
MDC_IDC_SET_LEADCHNL_RA_PACING_CATHODE_LOCATION_1: NORMAL
MDC_IDC_SET_LEADCHNL_RA_PACING_POLARITY: NORMAL
MDC_IDC_SET_LEADCHNL_RA_PACING_PULSEWIDTH: 0.4 MS
MDC_IDC_SET_LEADCHNL_RA_SENSING_ANODE_ELECTRODE_1: NORMAL
MDC_IDC_SET_LEADCHNL_RA_SENSING_ANODE_LOCATION_1: NORMAL
MDC_IDC_SET_LEADCHNL_RA_SENSING_CATHODE_ELECTRODE_1: NORMAL
MDC_IDC_SET_LEADCHNL_RA_SENSING_CATHODE_LOCATION_1: NORMAL
MDC_IDC_SET_LEADCHNL_RA_SENSING_POLARITY: NORMAL
MDC_IDC_SET_LEADCHNL_RA_SENSING_SENSITIVITY: 0.3 MV
MDC_IDC_SET_LEADCHNL_RV_PACING_AMPLITUDE: 2 V
MDC_IDC_SET_LEADCHNL_RV_PACING_ANODE_ELECTRODE_1: NORMAL
MDC_IDC_SET_LEADCHNL_RV_PACING_ANODE_LOCATION_1: NORMAL
MDC_IDC_SET_LEADCHNL_RV_PACING_CAPTURE_MODE: NORMAL
MDC_IDC_SET_LEADCHNL_RV_PACING_CATHODE_ELECTRODE_1: NORMAL
MDC_IDC_SET_LEADCHNL_RV_PACING_CATHODE_LOCATION_1: NORMAL
MDC_IDC_SET_LEADCHNL_RV_PACING_POLARITY: NORMAL
MDC_IDC_SET_LEADCHNL_RV_PACING_PULSEWIDTH: 0.4 MS
MDC_IDC_SET_LEADCHNL_RV_SENSING_ANODE_ELECTRODE_1: NORMAL
MDC_IDC_SET_LEADCHNL_RV_SENSING_ANODE_LOCATION_1: NORMAL
MDC_IDC_SET_LEADCHNL_RV_SENSING_CATHODE_ELECTRODE_1: NORMAL
MDC_IDC_SET_LEADCHNL_RV_SENSING_CATHODE_LOCATION_1: NORMAL
MDC_IDC_SET_LEADCHNL_RV_SENSING_POLARITY: NORMAL
MDC_IDC_SET_LEADCHNL_RV_SENSING_SENSITIVITY: 0.9 MV
MDC_IDC_SET_ZONE_DETECTION_INTERVAL: 200 MS
MDC_IDC_SET_ZONE_DETECTION_INTERVAL: 350 MS
MDC_IDC_SET_ZONE_DETECTION_INTERVAL: 400 MS
MDC_IDC_SET_ZONE_TYPE: NORMAL
MDC_IDC_STAT_AT_BURDEN_PERCENT: 0 %
MDC_IDC_STAT_AT_DTM_END: NORMAL
MDC_IDC_STAT_AT_DTM_START: NORMAL
MDC_IDC_STAT_BRADY_AP_VP_PERCENT: 21.4 %
MDC_IDC_STAT_BRADY_AP_VS_PERCENT: 0.11 %
MDC_IDC_STAT_BRADY_AS_VP_PERCENT: 78.45 %
MDC_IDC_STAT_BRADY_AS_VS_PERCENT: 0.04 %
MDC_IDC_STAT_BRADY_DTM_END: NORMAL
MDC_IDC_STAT_BRADY_DTM_START: NORMAL
MDC_IDC_STAT_BRADY_RA_PERCENT_PACED: 21.5 %
MDC_IDC_STAT_BRADY_RV_PERCENT_PACED: 99.82 %
MDC_IDC_STAT_EPISODE_RECENT_COUNT: 0
MDC_IDC_STAT_EPISODE_RECENT_COUNT: 1
MDC_IDC_STAT_EPISODE_RECENT_COUNT_DTM_END: NORMAL
MDC_IDC_STAT_EPISODE_RECENT_COUNT_DTM_START: NORMAL
MDC_IDC_STAT_EPISODE_TOTAL_COUNT: 0
MDC_IDC_STAT_EPISODE_TOTAL_COUNT: 0
MDC_IDC_STAT_EPISODE_TOTAL_COUNT: 1
MDC_IDC_STAT_EPISODE_TOTAL_COUNT: 1509
MDC_IDC_STAT_EPISODE_TOTAL_COUNT_DTM_END: NORMAL
MDC_IDC_STAT_EPISODE_TOTAL_COUNT_DTM_START: NORMAL
MDC_IDC_STAT_EPISODE_TYPE: NORMAL

## 2019-10-28 PROCEDURE — 93294 REM INTERROG EVL PM/LDLS PM: CPT | Mod: ZP | Performed by: INTERNAL MEDICINE

## 2019-10-28 PROCEDURE — 93296 REM INTERROG EVL PM/IDS: CPT | Mod: ZF

## 2019-11-30 ENCOUNTER — HOSPITAL ENCOUNTER (EMERGENCY)
Facility: CLINIC | Age: 84
Discharge: HOME | End: 2019-11-30
Payer: MEDICARE

## 2019-11-30 VITALS
HEIGHT: 59 IN | WEIGHT: 100 LBS | RESPIRATION RATE: 18 BRPM | OXYGEN SATURATION: 96 % | TEMPERATURE: 98.1 F | SYSTOLIC BLOOD PRESSURE: 148 MMHG | BODY MASS INDEX: 20.16 KG/M2 | DIASTOLIC BLOOD PRESSURE: 63 MMHG

## 2019-11-30 ASSESSMENT — MIFFLIN-ST. JEOR: SCORE: 769.23

## 2019-12-01 NOTE — ED NOTES
Pt's son in law reports pt is feeling better and would like to go home.  AMA signed.  Pt left after triage.

## 2019-12-05 ENCOUNTER — HOSPITAL ENCOUNTER (OUTPATIENT)
Facility: CLINIC | Age: 84
Setting detail: OBSERVATION
Discharge: OTHER INSTITUTION | End: 2019-12-06
Attending: EMERGENCY MEDICINE
Payer: MEDICARE

## 2019-12-05 ENCOUNTER — APPOINTMENT (OUTPATIENT)
Dept: CT IMAGING | Facility: CLINIC | Age: 84
End: 2019-12-05
Attending: EMERGENCY MEDICINE
Payer: MEDICARE

## 2019-12-05 ENCOUNTER — NURSE TRIAGE (OUTPATIENT)
Dept: NURSING | Facility: CLINIC | Age: 84
End: 2019-12-05

## 2019-12-05 DIAGNOSIS — N39.0 URINARY TRACT INFECTION WITHOUT HEMATURIA, SITE UNSPECIFIED: ICD-10-CM

## 2019-12-05 DIAGNOSIS — F20.3 UNDIFFERENTIATED SCHIZOPHRENIA (H): ICD-10-CM

## 2019-12-05 DIAGNOSIS — F20.9 SUBCHRONIC SCHIZOPHRENIA (H): ICD-10-CM

## 2019-12-05 PROBLEM — F03.918 DEMENTIA WITH AGGRESSIVE BEHAVIOR (H): Status: ACTIVE | Noted: 2019-12-05

## 2019-12-05 LAB
ALBUMIN SERPL-MCNC: 3.8 G/DL (ref 3.4–5)
ALBUMIN UR-MCNC: NEGATIVE MG/DL
ALP SERPL-CCNC: 83 U/L (ref 40–150)
ALT SERPL W P-5'-P-CCNC: 35 U/L (ref 0–50)
ANION GAP SERPL CALCULATED.3IONS-SCNC: 8 MMOL/L (ref 3–14)
APPEARANCE UR: CLEAR
AST SERPL W P-5'-P-CCNC: 27 U/L (ref 0–45)
BACTERIA #/AREA URNS HPF: ABNORMAL /HPF
BASOPHILS # BLD AUTO: 0 10E9/L (ref 0–0.2)
BASOPHILS NFR BLD AUTO: 0.6 %
BILIRUB SERPL-MCNC: 0.6 MG/DL (ref 0.2–1.3)
BILIRUB UR QL STRIP: NEGATIVE
BUN SERPL-MCNC: 25 MG/DL (ref 7–30)
CALCIUM SERPL-MCNC: 9.9 MG/DL (ref 8.5–10.1)
CHLORIDE SERPL-SCNC: 106 MMOL/L (ref 94–109)
CO2 SERPL-SCNC: 30 MMOL/L (ref 20–32)
COLOR UR AUTO: YELLOW
CREAT SERPL-MCNC: 1.14 MG/DL (ref 0.52–1.04)
DIFFERENTIAL METHOD BLD: NORMAL
EOSINOPHIL # BLD AUTO: 0.2 10E9/L (ref 0–0.7)
EOSINOPHIL NFR BLD AUTO: 2.4 %
ERYTHROCYTE [DISTWIDTH] IN BLOOD BY AUTOMATED COUNT: 13.4 % (ref 10–15)
GFR SERPL CREATININE-BSD FRML MDRD: 41 ML/MIN/{1.73_M2}
GLUCOSE SERPL-MCNC: 114 MG/DL (ref 70–99)
GLUCOSE UR STRIP-MCNC: NEGATIVE MG/DL
HCT VFR BLD AUTO: 43.8 % (ref 35–47)
HGB BLD-MCNC: 13.8 G/DL (ref 11.7–15.7)
HGB UR QL STRIP: NEGATIVE
IMM GRANULOCYTES # BLD: 0 10E9/L (ref 0–0.4)
IMM GRANULOCYTES NFR BLD: 0.3 %
KETONES UR STRIP-MCNC: NEGATIVE MG/DL
LEUKOCYTE ESTERASE UR QL STRIP: ABNORMAL
LYMPHOCYTES # BLD AUTO: 1.5 10E9/L (ref 0.8–5.3)
LYMPHOCYTES NFR BLD AUTO: 20.4 %
MCH RBC QN AUTO: 28.9 PG (ref 26.5–33)
MCHC RBC AUTO-ENTMCNC: 31.5 G/DL (ref 31.5–36.5)
MCV RBC AUTO: 92 FL (ref 78–100)
MONOCYTES # BLD AUTO: 0.6 10E9/L (ref 0–1.3)
MONOCYTES NFR BLD AUTO: 8.4 %
MUCOUS THREADS #/AREA URNS LPF: PRESENT /LPF
NEUTROPHILS # BLD AUTO: 4.9 10E9/L (ref 1.6–8.3)
NEUTROPHILS NFR BLD AUTO: 67.9 %
NITRATE UR QL: NEGATIVE
NRBC # BLD AUTO: 0 10*3/UL
NRBC BLD AUTO-RTO: 0 /100
PH UR STRIP: 7 PH (ref 5–7)
PLATELET # BLD AUTO: 168 10E9/L (ref 150–450)
POTASSIUM SERPL-SCNC: 4 MMOL/L (ref 3.4–5.3)
PROT SERPL-MCNC: 6.9 G/DL (ref 6.8–8.8)
RBC # BLD AUTO: 4.77 10E12/L (ref 3.8–5.2)
RBC #/AREA URNS AUTO: 2 /HPF (ref 0–2)
SODIUM SERPL-SCNC: 144 MMOL/L (ref 133–144)
SOURCE: ABNORMAL
SP GR UR STRIP: 1.01 (ref 1–1.03)
UROBILINOGEN UR STRIP-MCNC: 0 MG/DL (ref 0–2)
WBC # BLD AUTO: 7.2 10E9/L (ref 4–11)
WBC #/AREA URNS AUTO: 5 /HPF (ref 0–5)

## 2019-12-05 PROCEDURE — 80053 COMPREHEN METABOLIC PANEL: CPT | Performed by: EMERGENCY MEDICINE

## 2019-12-05 PROCEDURE — 93010 ELECTROCARDIOGRAM REPORT: CPT | Mod: Z6 | Performed by: EMERGENCY MEDICINE

## 2019-12-05 PROCEDURE — G0378 HOSPITAL OBSERVATION PER HR: HCPCS

## 2019-12-05 PROCEDURE — 99285 EMERGENCY DEPT VISIT HI MDM: CPT | Mod: 25 | Performed by: EMERGENCY MEDICINE

## 2019-12-05 PROCEDURE — 90791 PSYCH DIAGNOSTIC EVALUATION: CPT

## 2019-12-05 PROCEDURE — 25000132 ZZH RX MED GY IP 250 OP 250 PS 637: Mod: GY | Performed by: EMERGENCY MEDICINE

## 2019-12-05 PROCEDURE — 70450 CT HEAD/BRAIN W/O DYE: CPT

## 2019-12-05 PROCEDURE — 93005 ELECTROCARDIOGRAM TRACING: CPT | Performed by: EMERGENCY MEDICINE

## 2019-12-05 PROCEDURE — 85025 COMPLETE CBC W/AUTO DIFF WBC: CPT | Performed by: EMERGENCY MEDICINE

## 2019-12-05 PROCEDURE — 81001 URINALYSIS AUTO W/SCOPE: CPT | Performed by: EMERGENCY MEDICINE

## 2019-12-05 PROCEDURE — 87086 URINE CULTURE/COLONY COUNT: CPT | Performed by: EMERGENCY MEDICINE

## 2019-12-05 RX ORDER — ONDANSETRON 2 MG/ML
4 INJECTION INTRAMUSCULAR; INTRAVENOUS EVERY 6 HOURS PRN
Status: DISCONTINUED | OUTPATIENT
Start: 2019-12-05 | End: 2019-12-06 | Stop reason: HOSPADM

## 2019-12-05 RX ORDER — CEPHALEXIN 500 MG/1
500 CAPSULE ORAL ONCE
Status: COMPLETED | OUTPATIENT
Start: 2019-12-05 | End: 2019-12-05

## 2019-12-05 RX ORDER — ONDANSETRON 4 MG/1
4 TABLET, ORALLY DISINTEGRATING ORAL EVERY 6 HOURS PRN
Status: DISCONTINUED | OUTPATIENT
Start: 2019-12-05 | End: 2019-12-06 | Stop reason: HOSPADM

## 2019-12-05 RX ORDER — LIDOCAINE 40 MG/G
CREAM TOPICAL
Status: DISCONTINUED | OUTPATIENT
Start: 2019-12-05 | End: 2019-12-05

## 2019-12-05 RX ADMIN — CEPHALEXIN 250 MG: 250 CAPSULE ORAL at 23:10

## 2019-12-05 RX ADMIN — CEPHALEXIN 500 MG: 500 CAPSULE ORAL at 14:28

## 2019-12-05 ASSESSMENT — MIFFLIN-ST. JEOR: SCORE: 769.23

## 2019-12-05 NOTE — TELEPHONE ENCOUNTER
Daughter Tessa is calling and states that mom is having serious issues mentally.  Daughter feels that she is at harm to herself and possible others.  Michaela is extremely depressed and has mental health issues all her life.  Daughter states that mom is  hallucinating.      Reason for Disposition    [1] Acting confused (e.g., disoriented, slurred speech) AND [2] brief (now gone)    Additional Information    Negative: [1] Difficult to awaken or acting confused (e.g., disoriented, slurred speech) AND [2] present now AND [3] has diabetes (diabetes mellitus)    Negative: [1] Difficult to awaken or acting confused (e.g., disoriented, slurred speech) AND [2] present now AND [3] new onset    Negative: [1] Weakness of the face, arm, or leg on one side of the body AND [2] new onset    Negative: [1] Numbness of the face, arm, or leg on one side of the body AND [2] new onset    Negative: [1] Loss of speech or garbled speech AND [2] new onset    Negative: Difficulty breathing or bluish lips    Negative: Shock suspected (e.g., cold/pale/clammy skin, too weak to stand, low BP, rapid pulse)    Negative: Seeing, hearing, or feeling things that are not there (i.e., visual, auditory, or tactile hallucinations)    Negative: Followed a head injury    Negative: Drug overdose suspected    Negative: Sounds like a life-threatening emergency to the triager    Negative: Fever > 100.5 F (38.1 C)    Negative: Patient sounds very sick or weak to the triager    Protocols used: CONFUSION - DELIRIUM-A-AH

## 2019-12-05 NOTE — ED NOTES
To with pt and family about the next steps, that it could be a while before we found place and that we would get her moved to a different room in the ER for comfort and safety. Family has agreed to stay until after dinner to assist with her meal and then they will be leaving and we will be able to call them at anytime
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
## 2019-12-05 NOTE — ED NOTES
Pt reports she needs to use the bathroom, assisted up to bedside commode with staff assist of 2. Pt states she will be a while, ERT remains at bedside.
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
## 2019-12-06 ENCOUNTER — HOSPITAL ENCOUNTER (INPATIENT)
Facility: CLINIC | Age: 84
LOS: 25 days | Discharge: NURSING, REHAB OR LONG-TERM CARE | DRG: 885 | End: 2019-12-31
Attending: PSYCHIATRY & NEUROLOGY | Admitting: PSYCHIATRY & NEUROLOGY
Payer: MEDICARE

## 2019-12-06 VITALS
BODY MASS INDEX: 20.16 KG/M2 | HEIGHT: 59 IN | OXYGEN SATURATION: 99 % | TEMPERATURE: 98 F | WEIGHT: 100 LBS | SYSTOLIC BLOOD PRESSURE: 132 MMHG | HEART RATE: 74 BPM | DIASTOLIC BLOOD PRESSURE: 56 MMHG | RESPIRATION RATE: 16 BRPM

## 2019-12-06 DIAGNOSIS — E11.9 TYPE 2 DIABETES MELLITUS WITHOUT COMPLICATION, UNSPECIFIED WHETHER LONG TERM INSULIN USE (H): Primary | ICD-10-CM

## 2019-12-06 DIAGNOSIS — I10 ESSENTIAL HYPERTENSION, BENIGN: ICD-10-CM

## 2019-12-06 PROBLEM — R46.89 BEHAVIORAL CHANGE: Status: ACTIVE | Noted: 2019-12-06

## 2019-12-06 LAB
BACTERIA SPEC CULT: NORMAL
GLUCOSE BLDC GLUCOMTR-MCNC: 176 MG/DL (ref 70–99)
Lab: NORMAL
SPECIMEN SOURCE: NORMAL

## 2019-12-06 PROCEDURE — 00000146 ZZHCL STATISTIC GLUCOSE BY METER IP

## 2019-12-06 PROCEDURE — 25000132 ZZH RX MED GY IP 250 OP 250 PS 637: Mod: GY | Performed by: EMERGENCY MEDICINE

## 2019-12-06 PROCEDURE — 25000132 ZZH RX MED GY IP 250 OP 250 PS 637: Mod: GY | Performed by: PSYCHIATRY & NEUROLOGY

## 2019-12-06 PROCEDURE — G0378 HOSPITAL OBSERVATION PER HR: HCPCS

## 2019-12-06 PROCEDURE — 12400002 ZZH R&B MH SENIOR/ADOLESCENT

## 2019-12-06 RX ORDER — ATORVASTATIN CALCIUM 20 MG/1
20 TABLET, FILM COATED ORAL DAILY
Status: DISCONTINUED | OUTPATIENT
Start: 2019-12-06 | End: 2019-12-06 | Stop reason: HOSPADM

## 2019-12-06 RX ORDER — BISACODYL 10 MG
10 SUPPOSITORY, RECTAL RECTAL DAILY PRN
Status: DISCONTINUED | OUTPATIENT
Start: 2019-12-06 | End: 2019-12-31 | Stop reason: HOSPADM

## 2019-12-06 RX ORDER — PHENOBARBITAL 32.4 MG/1
32.4 TABLET ORAL 3 TIMES DAILY
Status: DISCONTINUED | OUTPATIENT
Start: 2019-12-06 | End: 2019-12-10

## 2019-12-06 RX ORDER — ACETAMINOPHEN 325 MG/1
650 TABLET ORAL EVERY 4 HOURS PRN
Status: DISCONTINUED | OUTPATIENT
Start: 2019-12-06 | End: 2019-12-31 | Stop reason: HOSPADM

## 2019-12-06 RX ORDER — TRAZODONE HYDROCHLORIDE 50 MG/1
50 TABLET, FILM COATED ORAL
Status: DISCONTINUED | OUTPATIENT
Start: 2019-12-06 | End: 2019-12-31 | Stop reason: HOSPADM

## 2019-12-06 RX ORDER — FLUPHENAZINE HYDROCHLORIDE 1 MG/1
0.5 TABLET, FILM COATED ORAL 2 TIMES DAILY
Status: DISCONTINUED | OUTPATIENT
Start: 2019-12-07 | End: 2019-12-31 | Stop reason: HOSPADM

## 2019-12-06 RX ORDER — OLANZAPINE 5 MG/1
5 TABLET ORAL EVERY 4 HOURS PRN
Status: DISCONTINUED | OUTPATIENT
Start: 2019-12-06 | End: 2019-12-31 | Stop reason: HOSPADM

## 2019-12-06 RX ORDER — HYDROXYZINE HYDROCHLORIDE 25 MG/1
25 TABLET, FILM COATED ORAL EVERY 4 HOURS PRN
Status: DISCONTINUED | OUTPATIENT
Start: 2019-12-06 | End: 2019-12-31 | Stop reason: HOSPADM

## 2019-12-06 RX ORDER — ALUMINA, MAGNESIA, AND SIMETHICONE 2400; 2400; 240 MG/30ML; MG/30ML; MG/30ML
30 SUSPENSION ORAL EVERY 4 HOURS PRN
Status: DISCONTINUED | OUTPATIENT
Start: 2019-12-06 | End: 2019-12-31 | Stop reason: HOSPADM

## 2019-12-06 RX ORDER — ONDANSETRON 4 MG/1
4 TABLET, FILM COATED ORAL EVERY 6 HOURS PRN
Status: DISCONTINUED | OUTPATIENT
Start: 2019-12-06 | End: 2019-12-31 | Stop reason: HOSPADM

## 2019-12-06 RX ORDER — FUROSEMIDE 20 MG
40 TABLET ORAL DAILY
Status: DISCONTINUED | OUTPATIENT
Start: 2019-12-06 | End: 2019-12-06 | Stop reason: HOSPADM

## 2019-12-06 RX ORDER — OLANZAPINE 10 MG/2ML
5 INJECTION, POWDER, FOR SOLUTION INTRAMUSCULAR EVERY 4 HOURS PRN
Status: DISCONTINUED | OUTPATIENT
Start: 2019-12-06 | End: 2019-12-31 | Stop reason: HOSPADM

## 2019-12-06 RX ORDER — CHLORDIAZEPOXIDE HYDROCHLORIDE 10 MG/1
10 CAPSULE, GELATIN COATED ORAL
Status: DISCONTINUED | OUTPATIENT
Start: 2019-12-06 | End: 2019-12-06 | Stop reason: HOSPADM

## 2019-12-06 RX ORDER — FLUPHENAZINE HYDROCHLORIDE 1 MG/1
0.5 TABLET, FILM COATED ORAL 2 TIMES DAILY
Status: DISCONTINUED | OUTPATIENT
Start: 2019-12-06 | End: 2019-12-06 | Stop reason: HOSPADM

## 2019-12-06 RX ADMIN — CEPHALEXIN 250 MG: 250 CAPSULE ORAL at 09:46

## 2019-12-06 RX ADMIN — CHLORDIAZEPOXIDE HYDROCHLORIDE 10 MG: 10 CAPSULE, GELATIN COATED ORAL at 16:42

## 2019-12-06 RX ADMIN — FUROSEMIDE 40 MG: 20 TABLET ORAL at 09:47

## 2019-12-06 RX ADMIN — Medication 32.4 MG: at 21:36

## 2019-12-06 RX ADMIN — METFORMIN HYDROCHLORIDE 1000 MG: 500 TABLET ORAL at 09:45

## 2019-12-06 RX ADMIN — ATORVASTATIN CALCIUM 20 MG: 20 TABLET, FILM COATED ORAL at 09:44

## 2019-12-06 RX ADMIN — CHLORDIAZEPOXIDE HYDROCHLORIDE 10 MG: 10 CAPSULE, GELATIN COATED ORAL at 09:54

## 2019-12-06 RX ADMIN — CEPHALEXIN 250 MG: 250 CAPSULE ORAL at 23:32

## 2019-12-06 RX ADMIN — FLUPHENAZINE HYDROCHLORIDE 0.5 MG: 1 TABLET, FILM COATED ORAL at 09:54

## 2019-12-06 RX ADMIN — FLUPHENAZINE HYDROCHLORIDE 0.5 MG: 1 TABLET, FILM COATED ORAL at 16:42

## 2019-12-06 RX ADMIN — CEPHALEXIN 250 MG: 250 CAPSULE ORAL at 16:41

## 2019-12-06 ASSESSMENT — ACTIVITIES OF DAILY LIVING (ADL)
HYGIENE/GROOMING: WITH ASSISTANCE
LAUNDRY: UNABLE TO COMPLETE
WHICH_OF_THE_ABOVE_FUNCTIONAL_RISKS_HAD_A_RECENT_ONSET_OR_CHANGE?: COGNITION
DRESS: WITH ASSISTANCE
ORAL_HYGIENE: WITH ASSISTANCE

## 2019-12-06 NOTE — ED NOTES
Spoke with pt's daughter, Tessa about legal guardianship. She will be bringing up the documentation today.

## 2019-12-06 NOTE — ED NOTES
VPM initiated. Patient resting comfortably in bed. VPM explained, call light provided.   Marilyn Nicholas RN on 12/5/2019 at 8:09 PM

## 2019-12-06 NOTE — ED NOTES
Pt assisted back to bed from the commode without incident, also contacted VPM to turn the privacy off so patient is watched for safety.

## 2019-12-06 NOTE — ED NOTES
Spoke [facility] and informed Harris that the daughter Tessa is back in agreement and informed [facility] to continue the admission process

## 2019-12-06 NOTE — ED NOTES
Spoke with Shahriar at [facility], information on pt being sent for possible placement.

## 2019-12-06 NOTE — ED NOTES
Spoke to Sophia at [facility], Sophia spoke to pt's daughter and came to an agreement that pt and pt's daughter would like pt in a long term care facility, will contact care transitions and inform MD

## 2019-12-07 LAB
ALBUMIN SERPL-MCNC: 3.4 G/DL (ref 3.4–5)
ALP SERPL-CCNC: 93 U/L (ref 40–150)
ALT SERPL W P-5'-P-CCNC: 30 U/L (ref 0–50)
ANION GAP SERPL CALCULATED.3IONS-SCNC: 3 MMOL/L (ref 3–14)
AST SERPL W P-5'-P-CCNC: 25 U/L (ref 0–45)
BILIRUB SERPL-MCNC: 0.5 MG/DL (ref 0.2–1.3)
BUN SERPL-MCNC: 30 MG/DL (ref 7–30)
CALCIUM SERPL-MCNC: 9.3 MG/DL (ref 8.5–10.1)
CHLORIDE SERPL-SCNC: 103 MMOL/L (ref 94–109)
CO2 SERPL-SCNC: 32 MMOL/L (ref 20–32)
CREAT SERPL-MCNC: 1.16 MG/DL (ref 0.52–1.04)
FOLATE SERPL-MCNC: 12.7 NG/ML
GFR SERPL CREATININE-BSD FRML MDRD: 41 ML/MIN/{1.73_M2}
GLUCOSE BLDC GLUCOMTR-MCNC: 182 MG/DL (ref 70–99)
GLUCOSE BLDC GLUCOMTR-MCNC: 271 MG/DL (ref 70–99)
GLUCOSE BLDC GLUCOMTR-MCNC: 309 MG/DL (ref 70–99)
GLUCOSE SERPL-MCNC: 166 MG/DL (ref 70–99)
HBA1C MFR BLD: 7.5 % (ref 0–5.6)
POTASSIUM SERPL-SCNC: 4.3 MMOL/L (ref 3.4–5.3)
PROT SERPL-MCNC: 6.6 G/DL (ref 6.8–8.8)
SODIUM SERPL-SCNC: 138 MMOL/L (ref 133–144)
VIT B12 SERPL-MCNC: 517 PG/ML (ref 193–986)

## 2019-12-07 PROCEDURE — 25000132 ZZH RX MED GY IP 250 OP 250 PS 637: Mod: GY | Performed by: PSYCHIATRY & NEUROLOGY

## 2019-12-07 PROCEDURE — 82607 VITAMIN B-12: CPT | Performed by: PSYCHIATRY & NEUROLOGY

## 2019-12-07 PROCEDURE — 82306 VITAMIN D 25 HYDROXY: CPT | Performed by: PSYCHIATRY & NEUROLOGY

## 2019-12-07 PROCEDURE — 99232 SBSQ HOSP IP/OBS MODERATE 35: CPT | Performed by: PHYSICIAN ASSISTANT

## 2019-12-07 PROCEDURE — 82746 ASSAY OF FOLIC ACID SERUM: CPT | Performed by: PSYCHIATRY & NEUROLOGY

## 2019-12-07 PROCEDURE — 99223 1ST HOSP IP/OBS HIGH 75: CPT | Mod: AI | Performed by: PSYCHIATRY & NEUROLOGY

## 2019-12-07 PROCEDURE — 80342 ANTIPSYCHOTICS NOS 1-3: CPT | Performed by: PSYCHIATRY & NEUROLOGY

## 2019-12-07 PROCEDURE — 00000146 ZZHCL STATISTIC GLUCOSE BY METER IP

## 2019-12-07 PROCEDURE — 12400002 ZZH R&B MH SENIOR/ADOLESCENT

## 2019-12-07 PROCEDURE — 84999 UNLISTED CHEMISTRY PROCEDURE: CPT | Performed by: PSYCHIATRY & NEUROLOGY

## 2019-12-07 PROCEDURE — 80053 COMPREHEN METABOLIC PANEL: CPT | Performed by: PSYCHIATRY & NEUROLOGY

## 2019-12-07 PROCEDURE — 83036 HEMOGLOBIN GLYCOSYLATED A1C: CPT | Performed by: PSYCHIATRY & NEUROLOGY

## 2019-12-07 PROCEDURE — 99207 ZZC CONSULT E&M CHANGED TO SUBSEQUENT LEVEL: CPT | Performed by: PHYSICIAN ASSISTANT

## 2019-12-07 PROCEDURE — 25000132 ZZH RX MED GY IP 250 OP 250 PS 637: Mod: GY | Performed by: PHYSICIAN ASSISTANT

## 2019-12-07 PROCEDURE — 80346 BENZODIAZEPINES1-12: CPT | Performed by: PSYCHIATRY & NEUROLOGY

## 2019-12-07 RX ORDER — DEXTROSE MONOHYDRATE 25 G/50ML
25-50 INJECTION, SOLUTION INTRAVENOUS
Status: DISCONTINUED | OUTPATIENT
Start: 2019-12-07 | End: 2019-12-31 | Stop reason: HOSPADM

## 2019-12-07 RX ORDER — ATORVASTATIN CALCIUM 20 MG/1
20 TABLET, FILM COATED ORAL EVERY EVENING
Status: DISCONTINUED | OUTPATIENT
Start: 2019-12-07 | End: 2019-12-31 | Stop reason: HOSPADM

## 2019-12-07 RX ORDER — NICOTINE POLACRILEX 4 MG
15-30 LOZENGE BUCCAL
Status: DISCONTINUED | OUTPATIENT
Start: 2019-12-07 | End: 2019-12-31 | Stop reason: HOSPADM

## 2019-12-07 RX ORDER — FUROSEMIDE 40 MG
40 TABLET ORAL DAILY
Status: DISCONTINUED | OUTPATIENT
Start: 2019-12-07 | End: 2019-12-15

## 2019-12-07 RX ADMIN — Medication 32.4 MG: at 14:49

## 2019-12-07 RX ADMIN — Medication 0.5 MG: at 08:47

## 2019-12-07 RX ADMIN — ATORVASTATIN CALCIUM 20 MG: 20 TABLET, FILM COATED ORAL at 22:20

## 2019-12-07 RX ADMIN — FUROSEMIDE 40 MG: 40 TABLET ORAL at 13:29

## 2019-12-07 RX ADMIN — Medication 32.4 MG: at 08:47

## 2019-12-07 RX ADMIN — Medication 0.5 MG: at 22:22

## 2019-12-07 RX ADMIN — CEPHALEXIN 250 MG: 250 CAPSULE ORAL at 06:33

## 2019-12-07 RX ADMIN — Medication 32.4 MG: at 22:20

## 2019-12-07 ASSESSMENT — ACTIVITIES OF DAILY LIVING (ADL)
LAUNDRY: UNABLE TO COMPLETE
HYGIENE/GROOMING: HANDWASHING;WITH ASSISTANCE
DRESS: SCRUBS (BEHAVIORAL HEALTH)
ORAL_HYGIENE: WITH ASSISTANCE
DRESS: SCRUBS (BEHAVIORAL HEALTH);WITH ASSISTANCE

## 2019-12-07 NOTE — PLAN OF CARE
Pt is utilizing reading and engaging with staff via verbal and written communication as coping skills.

## 2019-12-07 NOTE — PROGRESS NOTES
"CLINICAL NUTRITION SERVICES - ASSESSMENT NOTE     Nutrition Prescription    RECOMMENDATIONS FOR MDs/PROVIDERS TO ORDER:  None today    Malnutrition Status:    Patient does not meet two of the established criteria necessary for diagnosing malnutrition    Recommendations already ordered by Registered Dietitian (RD):  Boost Plus q breakfast (chocolate)   Listed preferences in diet order -- staff to assist pt filling out menu as needed    Future/Additional Recommendations:  If PO intakes decline, consider increasing frequency of Boost vs scheduling snacks     REASON FOR ASSESSMENT  Michaela Soria is a/an 89 year old female assessed by the dietitian for Admission Nutrition Risk Screen for reduced oral intake over the last month    NUTRITION HISTORY  Pt lives with her family and provided a list of food preferences. She likes to drink a chocolate milkshake daily but doesn't know what kind (regular milkshake vs Ensure?).  - Preferences: chocolate pudding with whipped cream, fruit with meals (any kind), cottage cheese with peaches, green beans, mashed potatoes, chicken/meats, hamburger (with pickles and mustard), grilled cheese sandwiches, coke, butter.     - Dislikes: sauerkraut, hotdogs, Mexican food, tortillas.    CURRENT NUTRITION ORDERS  Diet: Regular  Intake/Tolerance: pt ate blueberry muffin, cereal, milk, OJ and some bites of eggs for breakfast this morning    LABS  Labs reviewed  - A1C 7.5%    MEDICATIONS  Medications reviewed  - Metformin     ANTHROPOMETRICS  Ht Readings from Last 1 Encounters:   12/06/19 1.499 m (4' 11\")   Most Recent Weight: 45.4 kg (100 lb)  IBW: 44.3 kg (102% IBW)  BMI: Normal BMI  Weight History: Patient has lost 30 lbs (23%) over the last 2 months. She feels this is d/t not eating much (reports her son in law makes Nigerian food too much which is strongly dislikes).   Wt Readings from Last 10 Encounters:   12/05/19 45.4 kg (100 lb)   10/01/19 59 kg (130 lb)   04/25/19 61.2 kg (135 lb) "   06/04/18 54.4 kg (120 lb)   05/25/18 54.4 kg (120 lb)   10/09/17 59.1 kg (130 lb 6.4 oz)   08/13/17 62.6 kg (138 lb)   09/16/16 63.3 kg (139 lb 8 oz)   03/08/16 61.7 kg (136 lb)   01/28/16 61.7 kg (136 lb)     Dosing Weight: 45 kg - admit wt    ASSESSED NUTRITION NEEDS  Estimated Energy Needs: 1485-4329 kcals/day (25 - 30 kcals/kg)  Justification: Maintenance   Estimated Protein Needs: 45-54 grams protein/day (1 - 1.2 grams of pro/kg)  Justification: Elderly  Estimated Fluid Needs: 1 mL/kcal  Justification: Per provider pending fluid status    PHYSICAL FINDINGS  See malnutrition section below.     MALNUTRITION  % Intake: Decreased intake does not meet criteria  % Weight Loss: > 7.5% in 3 months (severe)  Subcutaneous Fat Loss: None observed  Muscle Loss: None observed (sarcopenia)   Fluid Accumulation/Edema: None noted  Malnutrition Diagnosis: Patient does not meet two of the established criteria necessary for diagnosing malnutrition    NUTRITION DIAGNOSIS  Predicted inadequate protein-energy intake related to variable appetite as evidenced by pt reliant on PO intakes to meet 100% of nutritional needs with potential for variation       INTERVENTIONS  Implementation  Discussed nutrition history and PO since admission. Discussed menu ordering and snacks available on the unit. Discussed oral nutrition supplements and she would enjoy a chocolate Boost daily. Feels she will get more than enough to eat during this admission. Encouraged adequate PO of food and fluids.    Goals  Patient to consume % of nutritionally adequate meal trays TID, or the equivalent with supplements/snacks.     Monitoring/Evaluation  Progress toward goals will be monitored and evaluated per protocol.      Marilyn Ceja RD, LD  On Call Pager: [phone]
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
## 2019-12-07 NOTE — CONSULTS
Consult Date:  12/07/2019      HISTORY OF PRESENT ILLNESS:  The patient is a 89-year-old female with reported history of schizophrenia, admitted to station 3B for severe psychiatric decompensation, reportedly due to her behavioral dysregulation.  Her family with whom she lives is not able to care for her and patient needs 24/7 supervision.  An Internal Medicine consultation was ordered by Dr. Davis to assess medical problems including type 2 diabetes.  At this time, Michaela is very hard of hearing so communication is via typing on a computer.  When asked if the patient has any out of the ordinary physical issues she wants to discuss, the patient declines and states she feels good.      Past Medical History:   Diagnosis Date     (HFpEF) heart failure with preserved ejection fraction (H)      Chronic kidney disease      Diabetes mellitus (H)      HLD (hyperlipidemia)     intolerant to statins     Hypertension      Malignant neoplasm (H)     Breast, s/p bilateral radical mastectomy     S/P placement of cardiac pacemaker     2014 2/2 CHB     Schizophrenia (H)      Tremor          Past Surgical History:   Procedure Laterality Date     COLONOSCOPY N/A 6/4/2018    Procedure: COLONOSCOPY;  colonoscopy;  Surgeon: Andres Casarez MD;  Location: [facility]     DILATION AND CURETTAGE       ENT SURGERY      R ear     HC COLONOSCOPY THRU STOMA, DIAGNOSTIC  04/23/2001     HC REMOVE TONSILS/ADENOIDS,<11 Y/O       HEMORRHOIDECTOMY       IMPLANT PACEMAKER  03/17/2014    symptomatic bradycardia     MASTECTOMY, MOD RADICAL (ANY)  1989    BILATERAL     PHACOEMULSIFICATION WITH STANDARD INTRAOCULAR LENS IMPLANT Left 5/4/2015    Procedure: PHACOEMULSIFICATION WITH STANDARD INTRAOCULAR LENS IMPLANT;  Surgeon: Naif Campbell MD;  Location: [facility]     PHACOEMULSIFICATION WITH STANDARD INTRAOCULAR LENS IMPLANT Right 8/13/2015    Procedure: PHACOEMULSIFICATION WITH STANDARD INTRAOCULAR LENS IMPLANT;  Surgeon: Naif Campbell MD;  Location:  [facility]         ADMISSION MEDICATIONS:  Reviewed and listed in the medication reconciliation list.      ALLERGIES/ADVERSE EFFECTS:  STRAWBERRIES, SULFA AND ZOMIG.      Social History     Socioeconomic History     Marital status:      Spouse name: Not on file     Number of children: 2     Years of education: 14+     Highest education level: Not on file   Occupational History     Occupation:      Employer: RETIRED   Social Needs     Financial resource strain: Not on file     Food insecurity:     Worry: Not on file     Inability: Not on file     Transportation needs:     Medical: Not on file     Non-medical: Not on file   Tobacco Use     Smoking status: Former Smoker     Packs/day: 0.20     Years: 10.00     Pack years: 2.00     Types: Cigarettes     Last attempt to quit: 1980     Years since quittin.9     Smokeless tobacco: Never Used   Substance and Sexual Activity     Alcohol use: No     Drug use: No     Sexual activity: Not Currently   Lifestyle     Physical activity:     Days per week: Not on file     Minutes per session: Not on file     Stress: Not on file   Relationships     Social connections:     Talks on phone: Not on file     Gets together: Not on file     Attends Synagogue service: Not on file     Active member of club or organization: Not on file     Attends meetings of clubs or organizations: Not on file     Relationship status: Not on file     Intimate partner violence:     Fear of current or ex partner: Not on file     Emotionally abused: Not on file     Physically abused: Not on file     Forced sexual activity: Not on file   Other Topics Concern      Service Not Asked     Blood Transfusions Not Asked     Caffeine Concern No     Comment: No caffiene     Occupational Exposure Not Asked     Hobby Hazards Not Asked     Sleep Concern Not Asked     Stress Concern Not Asked     Weight Concern Not Asked     Special Diet Not Asked     Back Care Not Asked     Exercise Not Asked      Bike Helmet Not Asked     Seat Belt Yes     Self-Exams Not Asked     Parent/sibling w/ CABG, MI or angioplasty before 65F 55M? No   Social History Narrative     Not on file         Family History   Problem Relation Age of Onset     Neurologic Disorder Mother      Sudden Death Mother      Cancer Father         lung cancer,  68     Musculoskeletal Disorder Father         deaf     Cerebrovascular Disease Paternal Grandfather         3 strokes         REVIEW OF SYSTEMS:  Ten-point review of systems negative except as stated above in history of present illness.      PHYSICAL EXAMINATION:   GENERAL:  Pleasant lady in no acute distress.   VITAL SIGNS:  Stable.  Temperature afebrile, pulse in the 60s, blood pressure 160/82, oxygen saturation 96% on room air.   HEENT:  Negative.   NECK:  Supple.  No cervical lymphadenopathy or thyromegaly.   LUNGS:  Clear.   CARDIOVASCULAR:  Regular rate and rhythm.   ABDOMEN:  Soft, nontender.   EXTREMITIES:  No edema.   SKIN:  No acute rash on exposed areas.   NEUROLOGIC:  She is awake, very hard of hearing.  Motor strength is weak but symmetric.  Gait deferred.      LABORATORY DATA:  Creatinine 1.16, GFR 41, glucose range since admission 114-176, otherwise CBC normal and rest of comprehensive metabolic panel unremarkable.  Hemoglobin A1c 7.5%.  Urinalysis unremarkable.  Urine culture with mixed urogenital sincere.  CT head without contrast reveals a possible colloid cyst, otherwise unremarkable.  EKG:  Ventricular pacemaker, otherwise unremarkable.      ASSESSMENT:   1.  Decompensating behavioral concerns in patient with reported history of schizophrenia per Dr. Davis.   2.  Type 2 diabetes, on Metformin prior to admission; however, currently contraindicated based on her creatinine clearance per discussion with Pharmacy.  Sugars since admission under fair control.   3.  Chronic kidney disease with baseline creatinine approximately 1.1-1.2, likely due to her longstanding history of  diabetes.   4.  Heart failure with preserved ejection fraction, appears to be well compensated at this time on prior to admission Lasix.   5.  Hypertension, stable with current pressures elevated mostly due to anxiety and not receiving her usual prior to admission Lasix this morning.   6.  History of complete heart block, status post cardiac pacemaker placement in  for which her pacemaker was recently interrogated and deemed to be functioning normally this past October.   7.  Severe hard of hearing.      PLAN:  Will discontinue Metformin and start glipizide at low dose of 2.5 mg daily and titrate up as indicated.  Accu-Cheks will be monitored b.i.d.  Hypoglycemic protocol will be instituted.  Continue with prior to admission Lasix 40 mg daily.  Daily weights.  No further medical intervention indicated at this time.  Medicine will continue to follow.  Please feel free to call with questions.      Thank you for this consultation.         CANDIE CARTAGENA PA-C             D: 2019   T: 2019   MT: PK      Name:     STACIE ALEGRIA   MRN:      [id number]        Account:       [id number]   :      1927           Consult Date:  2019      Document: [id number]       cc: Jorden Davis MD

## 2019-12-07 NOTE — PROGRESS NOTES
"48 Hour Nursing Assessment:  Pt has been pleasant and cooperative and is engaged and talkative with staff. Compliant with meds. Blunted affect but brightens while engaged. Pt speaks about a \"big tree\" at home and that \"he\" lives in the tree and speaks to her. Pt says \"he\" is a shadow. Pt also mentions trusting Arben to take her when it's her time to go and that 89 years is a long life. Pt ate well (%) for breakfast and lunch and is drinking fluids. Pt was continent of urine on commode with assist of staff. Pt was pleased to get washed up today and thanked staff for helping. Pt is unsteady on her feet and is an assist of two for transfers. Pt also needs assistance with cares. PT consult entered to assess gait and mobility.  "

## 2019-12-07 NOTE — H&P
Admitted:     12/06/2019      CHIEF COMPLAINT:  A 89-year-old woman admitted due to concerns of psychosis at home.      HISTORY OF PRESENT ILLNESS:  The patient is a 89-year-old woman who was admitted due to concerns of psychosis.  She requires a significant amount of cares from her family.  Here in the hospital, she seems extremely unstable on her feet.  We have had to keep her on 1:1 staffing, just to prevent falls.  Here, she has talked about knowing there are people living in her tree and that there are shadows that come into her room at night; however, she does not seem distressed by them.  She states that her family thinks she is crazy and her family is just jealous because the people in her tree and the shadows that come in at night, love her.  The patient denies that these shadows or people are problematic for her.  In fact, when she is talking to nursing, she would be curious statements about wondering how they managed to live outside, given it is so cold.  When I asked if there is anything I can do for her, she asked me just to believe her.  She was not agitated, not out of control.  Really, did not demonstrate any signs that she required this level of care.  She definitely is displaying psychosis but it appears that she likely could be managed at this point in time.  I believe that we will need to get further collateral directly from family for a better idea of exactly what we are treating.  The patient is extremely hard of hearing and thus the only way to communicate with her is either via writing or as we had done, use a computer with a word processing program and extremely large font that she can read.  As such, the interview takes a significant amount of time just to get very little information.  Overall, I spent well over 60 minutes with her face to face and was not able to get much information.  Collateral is going to be extremely important in this case.      PAST PSYCHIATRIC HISTORY:  The patient  has a history of schizophrenia, according to chart review; however, has not been hospitalized about 20 years.      PAST MEDICAL HISTORY:  Refer to Internal Medicine consultation as documented by Rustam Saeed.      SUBSTANCE HISTORY:  The patient denies any substance use.      PHYSICAL REVIEW OF SYSTEMS:  The patient reports that she has poor hearing and that she needs some assistance with walking, claims that she walks with a walker.  Other than that, she denies all problems on 10-point review of systems, except as noted in the HPI.      FAMILY HISTORY:  The patient was unable to identify any family history of mental illness to me.      SOCIAL HISTORY:  The patient reports that she lives at home.  It sounds like it is a multi-generational home.  She rambled off about 7 or 8 grandchildren that are in the house.      ALLERGIES:  STRAWBERRIES, SULFA DRUGS, ZOMIG.      PRIOR TO ADMISSION MEDICATIONS:  Lipitor 20 mg daily, Librium 10 mg 3 times daily as needed, iron tablets, Prolixin 0.5 mg 2 times daily, Lasix 40 mg daily, metformin 1000 mg in the morning and 500 mg at night.      LABORATORY DATA:  Comprehensive metabolic panel notable for creatinine of 1.16, which gives her an estimated GFR of 41.  Protein was low at 6.6.  Hemoglobin A1c was elevated at 7.5.  B12 and folate within normal limits.  Glucose is 166.  CBC with differential within normal limits.  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      VITAL SIGNS:  Temperature 98.4, pulse is 66, respiratory rate 16, blood pressure is 115/64, oxygen saturation is 95% on room air.      PHYSICAL EXAMINATION:  I reviewed physical exam as documented by Internal Medicine physician, Rustam Saeed, dated 12/07/2019.  I  have no additional findings at this time.      MENTAL STATUS EXAMINATION:  The patient is awake.  She was oriented to person and place, did not answer date.  She is casually dressed.  She is sitting in a wheelchair.  She is as cooperative as she can be throughout the interview.  She had some psychomotor retardation, a tremor when she was drinking her water glass.  Muscle strength and tone and gait and station were otherwise not tested due to unsteadiness on her feet.  She has a paucity of spontaneous speech, but did answer questions that were posed to her on the computer.  Speech was generally slow.  Language is otherwise intact.  Mood appears somewhat down.  Affect is congruent to mood, somewhat blunted.  Thought process is occasionally tangential with some loosening of associations.  Thought content is notable for auditory or visual hallucinations.  Denies suicidal thoughts.  Recent and remote memory were not formally assessed; however, she did seem to answer questions appropriately, it is just unclear the accuracy of her statements about her living situation and family.  I suspect that she has some difficulty, given the way she talked about her independence and ambulatory status at home.  Fund of knowledge appears adequate.  Attention and concentration are somewhat limited.  Insight is limited.  Judgment is limited.      DIAGNOSES:   1.  Schizophrenia by history.   2.  Rule out cognitive impairment due to chronic dementia-type process.   3.  Extremely hard of hearing.   4.  Type 2 diabetes.   5.  Chronic kidney disease.   6.  Heart failure.   7.  Hypertension.      PLAN:   1.  At this point in time, we will continue with patient's Prolixin dose.     2.  Librium was held by accepting physician who put in some sign and held orders prior to her arrival to the unit.  In place, he put in a phenobarbital taper.  Given that he will be the primary physician for this patient, we will continue with that at this time.   3.   Internal Medicine changed metformin to Glipizide.  Will follow their recommendations for management of her medical issues.   4.  Legal:  The patient is currently here voluntarily; however, she does not seem eager about being here.  She is also not asking to leave.   5.  Disposition:  It appears that care of the patient at home is probably the primary reason for the patient to come in.  Her level of psychosis and behaviors are not immediately demonstrating a need for hospitalization; however, I suspect that she requires a substantially higher level of care at home and if she indeed is having some problems related to dementia and psychosis, she may be more agitated and irritable at home and if there are small children around, this may be a difficult situation.  Primary team is going to have to reach out to family to work with them on disposition.         NATHAN WADSWORTH MD             D: 2019   T: 2019   MT: FAM      Name:     STACIE ALEGRIA   MRN:      [id number]        Account:      [id number]   :      1927        Admitted:     2019                   Document: [id number]       cc: Ruddy Whitten DO

## 2019-12-07 NOTE — PROGRESS NOTES
12/06/19 2301   Patient Belongings   Did you bring any home meds/supplements to the hospital?  Yes   Disposition of meds  Sent to security/pharmacy per site process   Patient Belongings locker   Patient Belongings Put in Hospital Secure Location (Security or Locker, etc.) clothing;jewelry;watch   Belongings Search Yes   Clothing Search Yes   Second Staff camilo Rouse RN   A               Admission:  I am responsible for any personal items that are not sent to the safe or pharmacy.  [facility] is not responsible for loss, theft or damage of any property in my possession.  Locker: coat, purse, toiletries, earrings, pants, shirt x4, necklace, watch.  Security: meds.  Signature:  _________________________________ Date: _______  Time: _____                                              Staff Signature:  ____________________________ Date: ________  Time: _____      2nd Staff person, if patient is unable/unwilling to sign:    Signature: ________________________________ Date: ________  Time: _____     Discharge:  [facility] has returned all of my personal belongings:    Signature: _________________________________ Date: ________  Time: _____                                          Staff Signature:  ____________________________ Date: ________  Time: _____

## 2019-12-07 NOTE — PROGRESS NOTES
"Pt is calm, pleasant and cooperative.  Communicating via staff writing on paper or typing on large computer screen. Denies AH/VH this evening.  Spent short amount of time in milieu - then asked to return to her room.  Appetite is good.  Affect is flat / brightens on approach.  Baseline essential tremor present.  States she feels \"good\" this evening.  Endorses passive SI - \"it's not good to live this long - it's hard.\"  Transferring with assist x 2.      Pt toileted on commode at start of shift.  She had a small amount of urine incontinence in her pull-up.  Coccyx and R/L gluteal region are reddened.  The skin is blanchable and intact, however the skin is quite delicate due to pt's advanced age.  Strict vadim interventions enacted.  Current Vadim score:  13.  Pulsate air mattress ordered.  Wound consult ordered.  Pt rotated q 1 hour when sitting up, q 2 hours in bed.  Heels offloaded in bed.  Area cleansed with bazaa cleanser and barrier cream applied.      Pt toileted q 2 hours.  One large urine output and three very small.  No BM this shift.  States she has not had a BM \"for many days\" - pt had large BM yesterday at [facility] ED.      Pre dinner .   at 2057.  Pt has not had oral diabetic medication since arrival last evening due to GFR.  Glipizide scheduled to start tomorrow a.m.  House officer notified - order to give Novolog 3 units one time. Novolog did not arrive to unit until after 2300.  BG rechecked - 187.  Do not give Novolog at this time per house officer.        Writer spoke to daughter, Tessa (CURLY in chart).  Updated her on pt's current condition and medication change - she reports that pt has taken Librium \"ever since I can remember.\"  Assured her that we are medicating and monitoring pt for withdrawals.  She plans to visit pt tomorrow evening.    "

## 2019-12-08 ENCOUNTER — APPOINTMENT (OUTPATIENT)
Dept: PHYSICAL THERAPY | Facility: CLINIC | Age: 84
DRG: 885 | End: 2019-12-08
Attending: PSYCHIATRY & NEUROLOGY
Payer: MEDICARE

## 2019-12-08 LAB
DEPRECATED CALCIDIOL+CALCIFEROL SERPL-MC: 23 UG/L (ref 20–75)
GLUCOSE BLDC GLUCOMTR-MCNC: 113 MG/DL (ref 70–99)
GLUCOSE BLDC GLUCOMTR-MCNC: 190 MG/DL (ref 70–99)
GLUCOSE BLDC GLUCOMTR-MCNC: 191 MG/DL (ref 70–99)
MISCELLANEOUS TEST: NORMAL

## 2019-12-08 PROCEDURE — 12400002 ZZH R&B MH SENIOR/ADOLESCENT

## 2019-12-08 PROCEDURE — 00000146 ZZHCL STATISTIC GLUCOSE BY METER IP

## 2019-12-08 PROCEDURE — 25000132 ZZH RX MED GY IP 250 OP 250 PS 637: Mod: GY | Performed by: PSYCHIATRY & NEUROLOGY

## 2019-12-08 PROCEDURE — 97530 THERAPEUTIC ACTIVITIES: CPT | Mod: GP

## 2019-12-08 PROCEDURE — 25000132 ZZH RX MED GY IP 250 OP 250 PS 637: Mod: GY | Performed by: PHYSICIAN ASSISTANT

## 2019-12-08 PROCEDURE — 97162 PT EVAL MOD COMPLEX 30 MIN: CPT | Mod: GP

## 2019-12-08 PROCEDURE — H2032 ACTIVITY THERAPY, PER 15 MIN: HCPCS

## 2019-12-08 RX ADMIN — FUROSEMIDE 40 MG: 40 TABLET ORAL at 09:27

## 2019-12-08 RX ADMIN — Medication 0.5 MG: at 09:27

## 2019-12-08 RX ADMIN — Medication 32.4 MG: at 21:31

## 2019-12-08 RX ADMIN — Medication 2.5 MG: at 06:42

## 2019-12-08 RX ADMIN — ATORVASTATIN CALCIUM 20 MG: 20 TABLET, FILM COATED ORAL at 21:31

## 2019-12-08 RX ADMIN — Medication 32.4 MG: at 14:54

## 2019-12-08 RX ADMIN — Medication 0.5 MG: at 21:32

## 2019-12-08 RX ADMIN — Medication 32.4 MG: at 09:26

## 2019-12-08 ASSESSMENT — ACTIVITIES OF DAILY LIVING (ADL)
ORAL_HYGIENE: INDEPENDENT;WITH ASSISTANCE
LAUNDRY: UNABLE TO COMPLETE
HYGIENE/GROOMING: HANDWASHING;WITH ASSISTANCE
DRESS: SCRUBS (BEHAVIORAL HEALTH)
HYGIENE/GROOMING: HANDWASHING;WITH ASSISTANCE
DRESS: SCRUBS (BEHAVIORAL HEALTH);WITH ASSISTANCE
LAUNDRY: UNABLE TO COMPLETE
ORAL_HYGIENE: INDEPENDENT;WITH ASSISTANCE

## 2019-12-08 ASSESSMENT — MIFFLIN-ST. JEOR: SCORE: 800.07

## 2019-12-08 NOTE — PLAN OF CARE
48 Hour Nursing Assessment:  Pt has been pleasant, cooperative and compliant with medication and cares. Denies mood concerns as well as other mental health symptoms. Pt is social, laughing and engaged with staff. Pt continues to eat well for meals and is drinking plenty of fluids. Incontinent of urine x 2 (large amount and very small amount) but is also able to go when toileted. Coccyx is reddened with no open areas. Area cleaned and barrier cream applied. Vadim score: 15. Vadim interventions in place. Pt repositioned q1h while in chair. Pulsate air mattress also in place on bed. Physical therapy saw patient today and determined pt is assist of 1 with CGA/SBA and walker. Pt ambulating with staff in hallway with use of walker periodically and enjoys this. Pt brushed her teeth with assistance/supervision of staff while standing at her bathroom sink. BG this AM was 190 and 113 before lunch. Remains on withdrawal precautions d/t Librium being discontinued and on phenobarbital taper. MSSA: 2.

## 2019-12-08 NOTE — PROGRESS NOTES
3 units of Insulin ordered once for high blood sugars   Patient to start Glipizide from tomorrow

## 2019-12-08 NOTE — PLAN OF CARE
Discharge Planner PT   Patient plan for discharge: not discussed today  Current status: PT eval complete. Pt appears slightly below baseline though is likely unreliable historian as pt indicated she was previously IND with dressing/bathing/toileting, unknown if this is accurate. Pt able to demo CGA sit<>stand to FWW with cues for effective technique, ambulates 2x100' + 2x25' with FWW and CGA slow but fairly steady, assist needed for toileting and safety. Would recommend Ax1 from staff with all mobility and self/cares for safety as pt high falls risk. Please encourage activity with hallway ambulation x4 daily using FWW + gait belt with CGA. PT will see 3x weekly for balance and strength training.  Barriers to return to prior living situation: behavioral needs, cognition, stairs, family unable to provide level of care pt requires  Recommendations for discharge: LTC + HH PT  Rationale for recommendations: pt appears slightly below baseline mobility, though was receiving 24/7 assist per chart review. As pt will require 24/7 assist and family no longer able to provide will need LTC, but would benefit from HH PT to maximize safe mobility and reduce falls risk.       Entered by: Celina Miranda 12/08/2019 9:28 AM

## 2019-12-08 NOTE — PROGRESS NOTES
"   12/08/19 0900   Quick Adds   Type of Visit Initial PT Evaluation   Living Environment   Lives With child(devora), adult;grandchild(devora)   Living Arrangements house   Home Accessibility stairs to enter home   Number of Stairs, Main Entrance 6   Stair Railings, Main Entrance railing on right side (ascending)   Living Environment Comment pt lives in house with kids, grand children, and great grand children. Pt has 6 [place] with single rail and uses SEC, tub/shower combo at home. Per chart review, family feeling pt needs are too intense to continue living at home as she requires 24/7 assistance/supervision.   Self-Care   Usual Activity Tolerance moderate   Current Activity Tolerance fair   Regular Exercise No   Equipment Currently Used at Home cane, straight;shower chair;walker, rolling   Activity/Exercise/Self-Care Comment pt is fairly sedentary at baseline, only walks short distances in home, uses wc for community mobility with pt pushing.   Functional Level Prior   Ambulation 3-->assistive equipment and person   Transferring 1-->assistive equipment   Toileting 1-->assistive equipment   Bathing 1-->assistive equipment   Communication 0-->understands/communicates without difficulty  (very [place])   Swallowing 0-->swallows foods/liquids without difficulty   Cognition 1 - attention or memory deficits   Fall history within last six months yes   Number of times patient has fallen within last six months 2   Which of the above functional risks had a recent onset or change? ambulation;toileting;bathing;fall history;cognition   Prior Functional Level Comment Pt reports using SEC or FWW in home with 24/7 family assist/supervision. She states she is IND/Brianda with dressing, bathring, toileting, but unknown how accurate this is as she also states family present \"all the time\".   General Information   Onset of Illness/Injury or Date of Surgery - Date 12/06/19   Referring Physician Miles Mccray MD   Patient/Family Goals Statement " "\"get up and move\"   Pertinent History of Current Problem (include personal factors and/or comorbidities that impact the POC) per chart review, \" The patient is a 89-year-old woman who was admitted due to concerns of psychosis.  She requires a significant amount of cares from her family. \"   Precautions/Limitations fall precautions   General Observations pt very [place], staff writes info then pt responds appropriately   Cognitive Status Examination   Orientation person   Level of Consciousness confused;alert   Follows Commands and Answers Questions 100% of the time  (when written)   Personal Safety and Judgment impaired   Memory impaired   Cognitive Comment pt follows commands ~100% of time when understood by written info first. Pt appears somewhat manic in that she is unable to stop giggling throughout and very fixated on her BM program. Some non-sense statements made throughout and is likely unreliable historian   Pain Assessment   Patient Currently in Pain No   Integumentary/Edema   Integumentary/Edema Comments mild B LE edema, likely baseline due to sedentary PLOF   Posture    Posture Kyphosis   Range of Motion (ROM)   ROM Comment gross limitations consistent with age, but WFL per functinoal mobility   Strength   Strength Comments 4/5 grossly in B LEs   Bed Mobility   Bed Mobility Comments NT   Transfer Skills   Transfer Comments CGA sit<>stand to, unsafe hand placement when not cued   Gait   Gait Comments pt ambulates 2x25' with FWW and CGA, short steps with shuffled pattern and mildly hunched posture but overall fairly steady with no over LOB noted, poor obstacle avoidance.   Balance   Balance Comments able to stand statically to wash handouts without UE support x30 sec without LOB   Sensory Examination   Sensory Perception Comments baseline tingling in BLEs per pt report   General Therapy Interventions   Planned Therapy Interventions balance training;bed mobility training;gait training;neuromuscular " "re-education;strengthening;ROM;transfer training;risk factor education;home program guidelines;progressive activity/exercise   Clinical Impression   Criteria for Skilled Therapeutic Intervention yes, treatment indicated   PT Diagnosis impaired functional mobility   Influenced by the following impairments weakness, cognition, reduced activity tolerance, impaired balance   Functional limitations due to impairments gait, transfers, self-cares/ADLs, stairs   Clinical Presentation Evolving/Changing   Clinical Presentation Rationale PMH, clinician impression   Clinical Decision Making (Complexity) Moderate complexity   Therapy Frequency 3x/week   Predicted Duration of Therapy Intervention (days/wks) 5-7 days   Anticipated Discharge Disposition Long Term Care Facility;Other (see comments)  (+ HH PT)   Risk & Benefits of therapy have been explained Yes   Patient, Family & other staff in agreement with plan of care Yes   Clinical Impression Comments pt appears mildly below baseline as she is needing CGA/SBA for all mobility and assist with self-cares. She is at very high risk for functional decline during admission and would benefit from IP PT 3x weekly to progress safe mobility toward PLOF.   [facility] [employer] \"6 Clicks\"   2016, Trustees of [facility], under license to [employer].  All rights reserved.   6 Clicks Short Forms Basic Mobility Inpatient Short Form   [facility] AM-PAC  \"6 Clicks\" V.2 Basic Mobility Inpatient Short Form   1. Turning from your back to your side while in a flat bed without using bedrails? 4 - None   2. Moving from lying on your back to sitting on the side of a flat bed without using bedrails? 4 - None   3. Moving to and from a bed to a chair (including a wheelchair)? 3 - A Little   4. Standing up from a chair using your arms (e.g., wheelchair, or bedside chair)? 3 - A Little   5. To walk in hospital room? 3 - A Little   6. Climbing 3-5 steps with a railing? 2 - A Lot "   Basic Mobility Raw Score (Score out of 24.Lower scores equate to lower levels of function) 19   Total Evaluation Time   Total Evaluation Time (Minutes) 15

## 2019-12-09 ENCOUNTER — DOCUMENTATION ONLY (OUTPATIENT)
Dept: OTHER | Facility: CLINIC | Age: 84
End: 2019-12-09

## 2019-12-09 LAB
ANION GAP SERPL CALCULATED.3IONS-SCNC: 4 MMOL/L (ref 3–14)
BUN SERPL-MCNC: 29 MG/DL (ref 7–30)
CALCIUM SERPL-MCNC: 8.9 MG/DL (ref 8.5–10.1)
CHLORIDE SERPL-SCNC: 104 MMOL/L (ref 94–109)
CO2 SERPL-SCNC: 31 MMOL/L (ref 20–32)
CREAT SERPL-MCNC: 1.07 MG/DL (ref 0.52–1.04)
GFR SERPL CREATININE-BSD FRML MDRD: 45 ML/MIN/{1.73_M2}
GLUCOSE BLDC GLUCOMTR-MCNC: 136 MG/DL (ref 70–99)
GLUCOSE BLDC GLUCOMTR-MCNC: 171 MG/DL (ref 70–99)
GLUCOSE BLDC GLUCOMTR-MCNC: 179 MG/DL (ref 70–99)
GLUCOSE SERPL-MCNC: 204 MG/DL (ref 70–99)
POTASSIUM SERPL-SCNC: 3.6 MMOL/L (ref 3.4–5.3)
SODIUM SERPL-SCNC: 139 MMOL/L (ref 133–144)

## 2019-12-09 PROCEDURE — 25000132 ZZH RX MED GY IP 250 OP 250 PS 637: Mod: GY | Performed by: PHYSICIAN ASSISTANT

## 2019-12-09 PROCEDURE — 93005 ELECTROCARDIOGRAM TRACING: CPT

## 2019-12-09 PROCEDURE — G0463 HOSPITAL OUTPT CLINIC VISIT: HCPCS

## 2019-12-09 PROCEDURE — 99232 SBSQ HOSP IP/OBS MODERATE 35: CPT | Performed by: NURSE PRACTITIONER

## 2019-12-09 PROCEDURE — 12400002 ZZH R&B MH SENIOR/ADOLESCENT

## 2019-12-09 PROCEDURE — 80048 BASIC METABOLIC PNL TOTAL CA: CPT | Performed by: PHYSICIAN ASSISTANT

## 2019-12-09 PROCEDURE — 0HBRXZZ EXCISION OF TOE NAIL, EXTERNAL APPROACH: ICD-10-PCS | Performed by: PODIATRIST

## 2019-12-09 PROCEDURE — 93010 ELECTROCARDIOGRAM REPORT: CPT | Performed by: INTERNAL MEDICINE

## 2019-12-09 PROCEDURE — 36415 COLL VENOUS BLD VENIPUNCTURE: CPT | Performed by: PHYSICIAN ASSISTANT

## 2019-12-09 PROCEDURE — 00000146 ZZHCL STATISTIC GLUCOSE BY METER IP

## 2019-12-09 PROCEDURE — 25000132 ZZH RX MED GY IP 250 OP 250 PS 637: Mod: GY | Performed by: PSYCHIATRY & NEUROLOGY

## 2019-12-09 RX ADMIN — FUROSEMIDE 40 MG: 40 TABLET ORAL at 07:15

## 2019-12-09 RX ADMIN — ATORVASTATIN CALCIUM 20 MG: 20 TABLET, FILM COATED ORAL at 19:44

## 2019-12-09 RX ADMIN — Medication 32.4 MG: at 13:27

## 2019-12-09 RX ADMIN — Medication 32.4 MG: at 07:15

## 2019-12-09 RX ADMIN — Medication 0.5 MG: at 07:15

## 2019-12-09 RX ADMIN — Medication 2.5 MG: at 07:15

## 2019-12-09 RX ADMIN — Medication 32.4 MG: at 19:44

## 2019-12-09 RX ADMIN — Medication 0.5 MG: at 19:44

## 2019-12-09 ASSESSMENT — ACTIVITIES OF DAILY LIVING (ADL)
DRESS: SCRUBS (BEHAVIORAL HEALTH)
LAUNDRY: UNABLE TO COMPLETE
DRESS: SCRUBS (BEHAVIORAL HEALTH)
ORAL_HYGIENE: INDEPENDENT
HYGIENE/GROOMING: WITH ASSISTANCE
LAUNDRY: UNABLE TO COMPLETE
ORAL_HYGIENE: INDEPENDENT;WITH ASSISTANCE

## 2019-12-09 NOTE — PROGRESS NOTES
Patient's BP was 184/97. Rechecked an hour after 179/81. Pt. is asymptomatic. FLOWER PARSONS PA-C notified through web page.    12:15 P.M. BP rechecked per request of PRABHA, was 154/92. P-63. PRABHA notified thru web page.

## 2019-12-09 NOTE — PROGRESS NOTES
Pt continues on SIO for high fall risk.  Impulsivity on attempts to stand without SBA not noted this shift.     Coccyx / gluteal area reddened, skin intact. Appears to be lessening in redness since last evening.  Skin is blanchable.  Area cleansed and barrier cream applied.  Pt shifted q 1 hr when seated, using seat cushion.  Shifted q 2 hrs when lying.  Pulsate air mattress being used.     Pt ambulated to the lounge and back with SBA using gait belt and walker.  Transferred to and from the toilet with assist x 2.  Pt's strength seems to be improved since admission.  BLE edema: +1 Right, +2 Left.  Pt was upright in a chair for several hours today.  After elevating feet and then lying in bed for one hour, edema is lessening.  Pt states that this is normal for her.    Pt urinated x 3 - one large, two small. Incontinent of urine x 1.  No BM this shift.      Appetite and sleep are good.      Pt would like her toenails cut - they are long and are bothering her.  Podiatry consult ordered per on call provider.      Pt participated in two groups this shift.    MSSA:  3  B  BP:  155/78

## 2019-12-09 NOTE — CONSULTS
Past Medical History:   Diagnosis Date     (HFpEF) heart failure with preserved ejection fraction (H)      Chronic kidney disease      Diabetes mellitus (H)      HLD (hyperlipidemia)     intolerant to statins     Hypertension      Malignant neoplasm (H)     Breast, s/p bilateral radical mastectomy     S/P placement of cardiac pacemaker     2014 2/2 CHB     Schizophrenia (H)      Tremor      Patient Active Problem List   Diagnosis     DEPRESSION,  PSYCHOSIS-MILD     Congestive heart failure (H)     HYPERTENSION     Fitting and adjustment of hearing aid     Diverticulosis of large intestine     Dyskinesia of esophagus     Nonspecific abnormal results of liver function study     Asymptomatic postmenopausal status     Personal history of malignant neoplasm of breast     Glaucoma     KNEE ARTHRITIS     External hemorrhoids     Hearing loss     Hyperlipidemia LDL goal <100     Advanced directives, counseling/discussion     Health Care Home     AV block, 2nd degree     Cardiac pacemaker - Medtronic, dual chamber- DEPENDENT (Advisa: MRI conditional)     Senile nuclear sclerosis     Type 2 diabetes mellitus without complication (H)     Sepsis (H)     Pneumonia of right lower lobe due to Haemophilus influenzae (H)     Generalized anxiety disorder     CHB (complete heart block) (H)     Behavioral change     Past Surgical History:   Procedure Laterality Date     COLONOSCOPY N/A 6/4/2018    Procedure: COLONOSCOPY;  colonoscopy;  Surgeon: Andres Casarez MD;  Location: [facility]     DILATION AND CURETTAGE       ENT SURGERY      R ear     HC COLONOSCOPY THRU STOMA, DIAGNOSTIC  04/23/2001     HC REMOVE TONSILS/ADENOIDS,<11 Y/O       HEMORRHOIDECTOMY       IMPLANT PACEMAKER  03/17/2014    symptomatic bradycardia     MASTECTOMY, MOD RADICAL (ANY)  1989    BILATERAL     PHACOEMULSIFICATION WITH STANDARD INTRAOCULAR LENS IMPLANT Left 5/4/2015    Procedure: PHACOEMULSIFICATION WITH STANDARD INTRAOCULAR LENS IMPLANT;  Surgeon: Adrian  Naif ARNOLD MD;  Location: [facility]     PHACOEMULSIFICATION WITH STANDARD INTRAOCULAR LENS IMPLANT Right 2015    Procedure: PHACOEMULSIFICATION WITH STANDARD INTRAOCULAR LENS IMPLANT;  Surgeon: Naif Campbell MD;  Location: [facility]     Social History     Socioeconomic History     Marital status:      Spouse name: Not on file     Number of children: 2     Years of education: 14+     Highest education level: Not on file   Occupational History     Occupation:      Employer: RETIRED   Social Needs     Financial resource strain: Not on file     Food insecurity:     Worry: Not on file     Inability: Not on file     Transportation needs:     Medical: Not on file     Non-medical: Not on file   Tobacco Use     Smoking status: Former Smoker     Packs/day: 0.20     Years: 10.00     Pack years: 2.00     Types: Cigarettes     Last attempt to quit: 1980     Years since quittin.9     Smokeless tobacco: Never Used   Substance and Sexual Activity     Alcohol use: No     Drug use: No     Sexual activity: Not Currently   Lifestyle     Physical activity:     Days per week: Not on file     Minutes per session: Not on file     Stress: Not on file   Relationships     Social connections:     Talks on phone: Not on file     Gets together: Not on file     Attends Yazdanism service: Not on file     Active member of club or organization: Not on file     Attends meetings of clubs or organizations: Not on file     Relationship status: Not on file     Intimate partner violence:     Fear of current or ex partner: Not on file     Emotionally abused: Not on file     Physically abused: Not on file     Forced sexual activity: Not on file   Other Topics Concern      Service Not Asked     Blood Transfusions Not Asked     Caffeine Concern No     Comment: No caffiene     Occupational Exposure Not Asked     Hobby Hazards Not Asked     Sleep Concern Not Asked     Stress Concern Not Asked     Weight Concern Not Asked      Special Diet Not Asked     Back Care Not Asked     Exercise Not Asked     Bike Helmet Not Asked     Seat Belt Yes     Self-Exams Not Asked     Parent/sibling w/ CABG, MI or angioplasty before 65F 55M? No   Social History Narrative     Not on file     Family History   Problem Relation Age of Onset     Neurologic Disorder Mother      Sudden Death Mother      Cancer Father         lung cancer,  68     Musculoskeletal Disorder Father         deaf     Cerebrovascular Disease Paternal Grandfather         3 strokes     Lab Results   Component Value Date    WBC 7.2 2019     Lab Results   Component Value Date    RBC 4.77 2019     Lab Results   Component Value Date    HGB 13.8 2019     Lab Results   Component Value Date    HCT 43.8 2019     No components found for: MCT  Lab Results   Component Value Date    MCV 92 2019     Lab Results   Component Value Date    MCH 28.9 2019     Lab Results   Component Value Date    MCHC 31.5 2019     Lab Results   Component Value Date    RDW 13.4 2019     Lab Results   Component Value Date     2019     Last Comprehensive Metabolic Panel:  Sodium   Date Value Ref Range Status   2019 139 133 - 144 mmol/L Final     Potassium   Date Value Ref Range Status   2019 3.6 3.4 - 5.3 mmol/L Final     Chloride   Date Value Ref Range Status   2019 104 94 - 109 mmol/L Final     Carbon Dioxide   Date Value Ref Range Status   2019 31 20 - 32 mmol/L Final     Anion Gap   Date Value Ref Range Status   2019 4 3 - 14 mmol/L Final     Glucose   Date Value Ref Range Status   2019 204 (H) 70 - 99 mg/dL Final     Urea Nitrogen   Date Value Ref Range Status   2019 29 7 - 30 mg/dL Final     Creatinine   Date Value Ref Range Status   2019 1.07 (H) 0.52 - 1.04 mg/dL Final     GFR Estimate   Date Value Ref Range Status   2019 45 (L) >60 mL/min/[1.73_m2] Final     Comment:     Non  GFR  Calc  Starting 12/18/2018, serum creatinine based estimated GFR (eGFR) will be   calculated using the Chronic Kidney Disease Epidemiology Collaboration   (CKD-EPI) equation.       Calcium   Date Value Ref Range Status   12/09/2019 8.9 8.5 - 10.1 mg/dL Final     Lab Results   Component Value Date    A1C 7.5 12/07/2019    A1C 8.0 11/03/2016    A1C 8.1 12/28/2015    A1C 7.0 09/11/2015    A1C 7.0 01/15/2015     Note dictated.
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
## 2019-12-09 NOTE — PROGRESS NOTES
Spoke with pt's daughter, Tessa at [phone] (UCRLY in chart), to discuss placement options for pt.  Tessa would like pt placed in a SNF.  She does not have financial information about pt's income, however, will speak with her spouse and get back to me with this information.

## 2019-12-09 NOTE — PROGRESS NOTES
"   12/08/19 2150   Behavioral [facility]   Hallucinations denies / not responding to hallucinations   Thinking distractable   Orientation person: oriented;place: oriented;date: oriented   Memory new learning, recall loss;other (see comment)  (asked same questions often)   Insight other (see comment)  (WILMER)   Judgement   (limited)   Eye Contact at examiner   Affect blunted, flat   Mood mood is calm   Physical Appearance/Attire posture slouched;neat   Hygiene well groomed   Suicidality thoughts only   1. Wish to be Dead (Recent) No   2. Non-Specific Active Suicidal Thoughts (Recent) No   Elopement   (none)   Activity withdrawn;other (see comment)  (participated in groups; lounge)   Speech pressured;other (see comments)  (slow to answer; incoherent at times)   Medication Sensitivity no stated side effects;no observed side effects   Psychomotor / Gait slow;unsteady;tremors   Activities of Daily Living   Hygiene/Grooming handwashing;with assistance   Oral Hygiene independent;with assistance   Dress scrubs (behavioral health);with assistance   Laundry unable to complete   Room Organization unable   Activity   Activity Assistance Provided assistance, 2 people;other (see comments)  (toileting requires 2)   Assistive Device Utilized gait belt;walker     Michaela had a good day. She had one instance of urine incontinence. She did not have a bowel movement today. She required 2 staff to assist with using the toilet. Sitting down and standing up were the most difficult for her, as she was very slow and unsteady. She was able to walk down the diaz with her walker and gait belt this evening with minimal staff support. She was moderately social in milieu, mostly with staff. She had visitors during dinner; her daughter, granddaughter, and son-in-law. The visit went well. However, she somewhat seriously/somewhat jokingly commented to her SIO staff after they had left, \"I hope they don't visit too often.\" Earlier in the shift while " sharing with writer about her home, she complained to writer about how she lives with too many children and too many family members and said that she does not want to return there after discharge. She said that she wanted to live in a nursing/assisted living home. Throughout shift she occasionally made jokes with staff and appeared to be in a generally good mood. When another staff was helping her get ready to go to bed, she was singing. She also told writer that she slept very well last night. It was a relatively active shift for her. No behavioral issues or distress.

## 2019-12-09 NOTE — PROGRESS NOTES
PRESCRIPTIONS   Total Prescriptions: 12   Total Private Pay: 0     Fill Date ID Written Drug Qty Days Prescriber Rx # Pharmacy Refill Daily Dose * Pymt Type    12/02/2019  1  07/02/2019  Chlordiazepoxide 10 Mg Capsule    90.00 30 Be Carver  [id number]  Rol (3520)  4/1 1.20 LME Other  [place]   11/04/2019  1  07/02/2019  Chlordiazepoxide 10 Mg Capsule    90.00 30 Be Carver  [id number]  Rol (3520)  3/1 1.20 [place]   09/29/2019  1  07/02/2019  Chlordiazepoxide 10 Mg Capsule    90.00 30 Be Carver  [id number]  Rol (3520)  2/1 1.20 [place]   08/28/2019  1  07/02/2019  Chlordiazepoxide 10 Mg Capsule    90.00 30 Be Carver  [id number]  Rol (3520)  1/1 1.20 [place]   07/29/2019  1  07/02/2019  Chlordiazepoxide 10 Mg Capsule    90.00 30 Be Carver  [id number]  Rol (3520)  0/1 1.20 [place]   06/29/2019  1  06/03/2019  Chlordiazepoxide 10 Mg Capsule    90.00 30 Be Carver  [id number]  Rol (3520)  1/1 1.20 [place]   06/03/2019  1  06/03/2019  Chlordiazepoxide 10 Mg Capsule    90.00 30 Be Carver  [id number]  Rol (3520)  0/1 1.20 [place]   04/27/2019  1  11/30/2018  Chlordiazepoxide 10 Mg Capsule    90.00 30 Be Carver  [id number]  Rol (3520)  4/1 1.20 [place]ME Other  [place]   03/26/2019  1  11/30/2018  Chlordiazepoxide 10 Mg Capsule    90.00 30 Be Carver  [id number]  Rol (3520)  3/1 1.20 LME Other  [place]   02/05/2019  1  11/30/2018  Chlordiazepoxide 10 Mg Capsule    90.00 30 Be Carver  [id number]  Rol (3520)  2/1 1.20 [place]ME Other  [place]   01/14/2019  1  11/30/2018  Chlordiazepoxide 10 Mg Capsule    90.00 30 Be Carver  [id number]  Rol (3520)  1/1 1.20 LME Other  [place]   12/21/2018  1  11/30/2018  Chlordiazepoxide 10 Mg Capsule    90.00 30 Be Carver  [id number]  Rol (3520)  0/1 1.20 LME Other  [place]   *Per CDC guidance, the MME conversion factors prescribed or provided as part of the medication-assisted treatment for opioid use disorder should not be used to benchmark against dosage thresholds meant for opioids prescribed for pain. Buprenorphine products have no agreed upon morphine  equivalency, and as partial opioid agonists, are not expected to be associated with overdose risk in the same dose-dependent manner as doses for full agonist opioids. MME = morphine milligram equivalents. LME = Lorazepam milligram equivalents. mg = dose in milligrams.   Providers   Total Providers: 1     Name Address City State Zipcode Phone   R[address] [facility] [address] [phone]     Pharmacies   Total Pharmacies: 1     Name Address City State Zipcode Phone   [facility] [phone] [facility] [address] [phone]

## 2019-12-09 NOTE — PROGRESS NOTES
12/08/19 2116   Therapeutic Recreation   Type of Intervention structured groups   Activity game   Response Participates, initiates socially appropriate   Hours 1     Pt attended the Therapeutic Recreation group with focus on stress reduction, creative expression, and leisure participation. Engaged and cooperative in a  recreational activity via a group drawing game. Pt participated as a guesser throughout entire duration of the group. Pt added to group discussion, sociable, and was appropriate with interactions. Showed progress in session goals. Pt mood was calm. Pt shared with group that she enjoyed painting, knitting, and playing the piano.

## 2019-12-09 NOTE — PROGRESS NOTES
12/09/19 1430   Behavioral Health   Hallucinations denies / not responding to hallucinations;visual;other (see comment)  (pt stated overnight she was seeing shadows on the wall)   Thinking distractable;other (see comment)  ([place]-limited communcation)   Orientation person: oriented;place: oriented;date: oriented;time: oriented   Memory new learning, recall loss;baseline memory;other (see comment)  (ruminative at times)   Insight insight appropriate to situation   Judgement intact  (limited)   Eye Contact at examiner;at floor   Affect blunted, flat;full range affect   Mood mood is calm   Physical Appearance/Attire neat;attire appropriate to age and situation   Hygiene other (see comment)  (adeuate, well groomed with assistance)   Suicidality other (see comments)  (none stated/observed)   1. Wish to be Dead (Recent) No   2. Non-Specific Active Suicidal Thoughts (Recent) No   Self Injury other (see comment)  (none stated/observed)   Elopement   (none.)   Activity other (see comment)  (present in milieu and with peers)   Speech clear;coherent;other (see comments)  (slow to respond, but speaks appropriately)   Medication Sensitivity no stated side effects;no observed side effects   Psychomotor / Gait balanced;steady;slow;tremors  (uses walker, gait belt, and SBA x1)   Coping/Psychosocial   Verbalized Emotional State sadness;acceptance   Plan of Care Reviewed With patient   Patient Agreement with Plan of Care agrees   Psycho Education   Type of Intervention 1:1 intervention   Response participates, initiates socially appropriate   Hours 0.5   Treatment Detail check in   Group Therapy Session   Group Attendance other (see comments)  (meeting with doctors/nurses during group times)   Safety   Suicidality Status 15;Optimize communication / relationship to minimize opportunity for self-harm;Promote patient engagement with treatment process;Identify and strengthen protective factors;Other (see comment)  (SIO for fall risk)    Activities of Daily Living   Hygiene/Grooming with assistance;independent;handwashing   Oral Hygiene independent;with assistance   Dress scrubs (behavioral health)   Laundry unable to complete   Room Organization unable   Activity   Activity Assistance Provided assistance, 1 person;assistance, stand-by   Assistive Device Utilized gait belt;walker;other (see comments)  (at home uses cane/walker)   Ambulation Distance (Feet) 150   Symptoms Noted During/After Activity none     Pt was out in unge most of shift watching tv, socializing with staff/peers/eating. Pt eating ~% of tray for both breakfast and lunch. Pt able to make her needs known to staff. Pt went for a walk x2 this shift with treaded socks, gait belt, walker, and SBA x1. Pt legs swollen at times in the ankles.feet and elevated when able. No stated SI/SIB. Pt A&Ox4, however needs to be reminded of situation at times as patient is easily distractable. [place] and staff is able to use computer/notepad so pt can read it and respond. At times pt is slow to respond, but gives appropriate responses. Pt affect flat at times and full range at other times, mood is calm. Pt was sad when told that she cannot stay here forever, but accepting of that. Pt also sad during check in because she does not wish to go into a nursing home. Pt expressed understanding of needing more assistance, but asked about her rights and  when CTC/staff asked to speak with her family about nursing homes. Pt using music to cope and can play the piano. Pt calm, cooperative, and pleasant with check in.
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
## 2019-12-09 NOTE — PROGRESS NOTES
"[facility] Nurse Inpatient Pressure Injury Assessment   Reason for consultation: Evaluate and treat possible pressure injury to coccyx      ASSESSMENT  Pressure Injury: on coccyx, only blanchable erythema noted on assessment today, no pressure injury noted   Status: initial assessment     TREATMENT PLAN  Pressure Injury Prevention ACTIVITY MEASURES:  If pt is refusing to turn or reposition they must be educated on the  potential injury from not off loading pressure.  Then this \"educated refusal\" needs to be documented as an \"educated refusal to turn/ reposition\" and document if alert, etc. Additionally, if repeated refusals ensure the charge nurse, nurse manager and the provider is aware.  Follow Vadim Risk recommendations      CHAIR: Pt should sit on a chair cushion ([id number]) when up to the chair and not sit for more than one hour at a time before fully offloading backside (either stand for a couple of minutes and/or return to bed, positioning on a side) to relieve pressure and re-perfuse tissue.  Additionally, encourage pt to shift side to side every 15 minutes, too.    NOTE: the Z-Flow Pad is NOT a cushion, pt should NOT sit on the Z-Flow pads      BED:  Reposition side to side every 1-2 hours when awake.     No direct supine positioning, position only side to side    Consider Z-Kirit Pillows to help keep pt on side (#[id number]-medium or #[id number]- large). *Z-Flows are for positioning, DO NOT SIT ON!    Keep heels elevated    As able keep HOB below 30 degrees    Evaluate for specialty mattress    Use TAPS wedges and perform frequent microturns as pt tolerates    Use Covidean blue and white sheets (no green  pads) in bed and chair if incontinent    Orders Written  [facility] Nurse follow-up plan:signing off  Nursing to notify the Provider(s) and re-consult the [facility] Nurse if wound(s) deteriorates or new skin concern.    Patient History  According to provider note(s):  Per MINH Castro on 12/8/19: The patient is a 89-year-old " female with reported history of schizophrenia, admitted to station [facility] for severe psychiatric decompensation, reportedly due to her behavioral dysregulation.  Her family with whom she lives is not able to care for her and patient needs  supervision.  An Internal Medicine consultation was ordered by Dr. Davis to assess medical problems including type 2 diabetes.  At this time, Michaela is very hard of hearing so communication is via typing on a computer.  When asked if the patient has any out of the ordinary physical issues she wants to discuss, the patient declines and states she feels good.     Objective Data  Containment of urine/stool: Continent of bladder and Continent of bowel, occasional urinary incontinence    Current Diet/ Nutrition:  Orders Placed This Encounter      Regular Diet Adult      Output:   No intake/output data recorded.    Risk Assessment:   Sensory Perception: 2-->very limited  Moisture: 3-->occasionally moist  Activity: 3-->walks occasionally  Mobility: 2-->very limited  Nutrition: 3-->adequate  Friction and Shear: 2-->potential problem  Vadim Score: 15      Labs:   Recent Labs   Lab 19  0819 19  1230   ALBUMIN 3.4 3.8   HGB  --  13.8   WBC  --  7.2   A1C 7.5*  --        Physical Exam  Skin inspection: focused buttock, sacrum, coccyx,     Wound Location:  No pressure injury noted on assessment today  Skin on bilateral buttocks, sacrum, coccyx and in the  cleft assessed. All with pink, blanchable intact dermis. Patient is at risk for pressure injury due to limited mobility. See orders above for pressure injury prevention.      Interventions  Current support surface: Standard  Low air loss mattress with pulsation   Current off-loading measures: Pillows  Repositioning aid: Pillows  Visual inspection of wound(s) completed   Tube Securement: N/A  Wound Care: was done per plan of care.  Supplies: floor stock  Educated provided: importance of repositioning and plan of  care  Education provided to: nurse  Discussed importance of:repositioning every 2 hours, off-loading pressure to wound, their role in pressure injury prevention and moisture management  Discussed plan of care with Nurse    Verona Lobato RN, CWOCN

## 2019-12-10 ENCOUNTER — APPOINTMENT (OUTPATIENT)
Dept: PHYSICAL THERAPY | Facility: CLINIC | Age: 84
DRG: 885 | End: 2019-12-10
Attending: PSYCHIATRY & NEUROLOGY
Payer: MEDICARE

## 2019-12-10 LAB
GLUCOSE BLDC GLUCOMTR-MCNC: 111 MG/DL (ref 70–99)
GLUCOSE BLDC GLUCOMTR-MCNC: 196 MG/DL (ref 70–99)
GLUCOSE BLDC GLUCOMTR-MCNC: 354 MG/DL (ref 70–99)
INTERPRETATION ECG - MUSE: NORMAL

## 2019-12-10 PROCEDURE — 25000132 ZZH RX MED GY IP 250 OP 250 PS 637: Mod: GY | Performed by: NURSE PRACTITIONER

## 2019-12-10 PROCEDURE — 97110 THERAPEUTIC EXERCISES: CPT | Mod: GP

## 2019-12-10 PROCEDURE — 99232 SBSQ HOSP IP/OBS MODERATE 35: CPT | Performed by: NURSE PRACTITIONER

## 2019-12-10 PROCEDURE — 12400002 ZZH R&B MH SENIOR/ADOLESCENT

## 2019-12-10 PROCEDURE — 00000146 ZZHCL STATISTIC GLUCOSE BY METER IP

## 2019-12-10 PROCEDURE — 25000132 ZZH RX MED GY IP 250 OP 250 PS 637: Mod: GY | Performed by: PSYCHIATRY & NEUROLOGY

## 2019-12-10 PROCEDURE — G0177 OPPS/PHP; TRAIN & EDUC SERV: HCPCS

## 2019-12-10 PROCEDURE — 25000132 ZZH RX MED GY IP 250 OP 250 PS 637: Mod: GY | Performed by: PHYSICIAN ASSISTANT

## 2019-12-10 RX ORDER — PHENOBARBITAL 16.2 MG/1
16.2 TABLET ORAL 3 TIMES DAILY
Status: DISCONTINUED | OUTPATIENT
Start: 2019-12-10 | End: 2019-12-13

## 2019-12-10 RX ORDER — DOCUSATE SODIUM 100 MG/1
100 CAPSULE, LIQUID FILLED ORAL 2 TIMES DAILY
Status: DISCONTINUED | OUTPATIENT
Start: 2019-12-10 | End: 2019-12-31 | Stop reason: HOSPADM

## 2019-12-10 RX ADMIN — MAGNESIUM HYDROXIDE 30 ML: 400 SUSPENSION ORAL at 12:19

## 2019-12-10 RX ADMIN — DOCUSATE SODIUM 100 MG: 100 CAPSULE, LIQUID FILLED ORAL at 12:07

## 2019-12-10 RX ADMIN — Medication 2.5 MG: at 06:30

## 2019-12-10 RX ADMIN — PHENOBARBITAL 16.2 MG: 16.2 TABLET ORAL at 20:52

## 2019-12-10 RX ADMIN — FUROSEMIDE 40 MG: 40 TABLET ORAL at 08:43

## 2019-12-10 RX ADMIN — ATORVASTATIN CALCIUM 20 MG: 20 TABLET, FILM COATED ORAL at 20:52

## 2019-12-10 RX ADMIN — PHENOBARBITAL 16.2 MG: 16.2 TABLET ORAL at 08:43

## 2019-12-10 RX ADMIN — PHENOBARBITAL 16.2 MG: 16.2 TABLET ORAL at 14:03

## 2019-12-10 RX ADMIN — Medication 0.5 MG: at 08:44

## 2019-12-10 RX ADMIN — Medication 0.5 MG: at 20:54

## 2019-12-10 RX ADMIN — DOCUSATE SODIUM 100 MG: 100 CAPSULE, LIQUID FILLED ORAL at 20:52

## 2019-12-10 ASSESSMENT — ACTIVITIES OF DAILY LIVING (ADL): HYGIENE/GROOMING: HANDWASHING;INDEPENDENT

## 2019-12-10 ASSESSMENT — MIFFLIN-ST. JEOR: SCORE: 813.68

## 2019-12-10 NOTE — PLAN OF CARE
Pt remains calm, pleasant and cooperative. Pt is appropriate and social with staff and peers to her capability (pt is extremely [place]). Smiling and engaged. Pt attended art therapy and painted with watercolor. Pt brought to standing position to relieve pressure from her bottom q1h and educated regarding the importance of this. Vadim score: 15. Pt had c/o constipation last evening; Colace 100 mg BID ordered by provider. Remains on MSSA d/t discontinuation of Librium with Phenobarb taper. MSSA: 1.

## 2019-12-10 NOTE — CONSULTS
Consult Date:  2019      SUBJECTIVE:  A 89-year-old female consulted to Podiatry by VIV Lyn, CNP, for toenail care in a diabetic.  Patient seen at bedside with nursing.  She has a history of diabetes.  She relates she needs her toenails cut and they hurt some times.        OBJECTIVE:  DP and PT are 1/4 bilaterally.  She has peripheral edema bilaterally.  Decreased hair growth bilaterally.  No erythema, no drainage, no odor, and no calor bilaterally.  She has dystrophic, thickened, brittle nails with incurvation, subungual debris, dystrophy, and discoloration of the hallux nails bilaterally, minimally the lesser nails bilaterally.  CFT is less than 3 seconds bilaterally.        ASSESSMENT AND PLAN:   Onychomycosis and onychauxis bilaterally.  She is diabetic with peripheral vascular disease.  Diagnosis and treatment discussed with her.  All the nails were debrided and reduced bilaterally upon consent.  Will follow as needed.         AMANDA CARROLL DPM             D: 2019   T: 2019   MT: BRITTNEY      Name:     STACIE ALEGRIA   MRN:      [id number]        Account:       [id number]   :      1927           Consult Date:  2019      Document: [id number]

## 2019-12-10 NOTE — PROGRESS NOTES
4:30 a.m.Patient was assisted to the toilet with assist of 2 using a WW and a safety belt. Patient voided freely. No urinary nor bowel incontinence noted. Patient back to bed.    6:00 30 a.m. Patient turned to her left side.  Patient cooperative. Patient educated on the need to reposition her every 1-2 hours. Patient demonstrated understanding by nodding her head. Explanation was written down in a piece of paper. A pillow placed in between patient's knees and HOB placed in a 15-30 degree-angle. Patient felt comfortable as claimed. She took her Glipizide well with vanilla pudding. Patient went back to sleep.

## 2019-12-10 NOTE — PLAN OF CARE
"INITIAL OT NOTE  Problem: OT General Care Plan  Goal: OT Goal 1  Will attend OT groups and participate actively in all OT opportunities. Will assess and set goals.     Pt attended 1 out of 2 OT groups offered. Pt actively participated in occupational therapy clinic. Pt was able to ask for assistance as needed, and initiated a familiar, self-directed, creative expression (watercolor painting) task with assistance in task set-up. Pt demonstrated good focus, planning, sequencing, and attention to detail, despite observable hand tremors. She appropriately joked about her task performance, stating \"I should quit while I'm ahead!\" She initiated comments throughout group, which were spontaneous in nature, as she did not appear to hear or understand peers. She was able to articulate her needs for task materials effectively. Calm, pleasant, and cooperative throughout this group. Will continue to assess. Initial assessment to be completed upon additional group participation.     "
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
## 2019-12-10 NOTE — PLAN OF CARE
Discharge Planner PT   Patient plan for discharge: not discussed  Current status: Pt agreeable and motivated for mobility, needs supervision for bathroom task, CGA-close SBA in hallway w/ FWW for gait for 200' x2. Pt with good activity tolerance, steady with device. Participates in standing LE exercises.   Barriers to return to prior living situation: behavioral needs, cognition, stairs, family unable to provide level of care pt requires  Recommendations for discharge: LTC + HH PT  Rationale for recommendations: pt appears slightly below baseline mobility, though was receiving 24/7 assist per chart review. As pt will require 24/7 assist and family no longer able to provide will need LTC, but would benefit from HH PT to maximize safe mobility and reduce falls risk.       Entered by: Anu Varela 12/10/2019 5:23 PM

## 2019-12-10 NOTE — PROGRESS NOTES
Pt noted to be very constipated this AM. Pt complained of soreness while sitting. On observation patient was noted to have a bulging rectum with hard stool visible. Pt was encouraged to try and push/expell this.   Celestino was consulted and request was made to talk to medical team.    Kayla CHAND/PA was contacted. Lorraine NAVARRO/NAHID was contacted as manual disimpaction may be needed and to ask about current hospital policy/practice. She suggested that medical  this issue as well as to see if medical flyer feels comfortable with manually disimpacting stool as this is not something that is done routinely on unit by staff nurses. Medical flyer has been paged.   Pt was able to expel partial stool. Pt was offered/accepted 240 ml of prune juices as well as scheduled colace and MOM.     Pt was able to expel a moderate amount of hard/soft brown stool. Pt voiced relief. Rectum was no longer bulging. Pt noted to have a couple of small hemorrhoids after straining.

## 2019-12-10 NOTE — PLAN OF CARE
Pt presents with blunted, flat affect and depressed mood. Denies SI/SIB. Reports that she has been seeing people in the trees, but would not elaborate further. She hopes that she can go to an assisted living because she does not want to go back with her family because they do not love her. [facility]. She ambulated to her room and back to [facility] x3 this evening. No episodes of incontinence this evening. She c/o constipation this evening. No BM on this shift. Med compliant. Ate 85% of her evening meal. No other complaints or concerns at this time.

## 2019-12-10 NOTE — PROGRESS NOTES
BG this afternoon 354. However, this was after pt drank 2 prune juices and ate half of her lunch. Medical contacted and notified.    Medical contacted regarding BG TID order. Order modified to BG BID d/t pt being on only oral glipizide and managing blood sugar levels adequately.

## 2019-12-11 LAB
FLUPHENAZINE SERPL-MCNC: <0.2 NG/ML (ref 1–10)
GLUCOSE BLDC GLUCOMTR-MCNC: 169 MG/DL (ref 70–99)
GLUCOSE BLDC GLUCOMTR-MCNC: 184 MG/DL (ref 70–99)

## 2019-12-11 PROCEDURE — 99232 SBSQ HOSP IP/OBS MODERATE 35: CPT | Performed by: NURSE PRACTITIONER

## 2019-12-11 PROCEDURE — H2032 ACTIVITY THERAPY, PER 15 MIN: HCPCS

## 2019-12-11 PROCEDURE — G0177 OPPS/PHP; TRAIN & EDUC SERV: HCPCS

## 2019-12-11 PROCEDURE — 00000146 ZZHCL STATISTIC GLUCOSE BY METER IP

## 2019-12-11 PROCEDURE — 25000132 ZZH RX MED GY IP 250 OP 250 PS 637: Mod: GY | Performed by: NURSE PRACTITIONER

## 2019-12-11 PROCEDURE — 25000132 ZZH RX MED GY IP 250 OP 250 PS 637: Mod: GY | Performed by: PSYCHIATRY & NEUROLOGY

## 2019-12-11 PROCEDURE — 25000132 ZZH RX MED GY IP 250 OP 250 PS 637: Mod: GY | Performed by: PHYSICIAN ASSISTANT

## 2019-12-11 PROCEDURE — 12400002 ZZH R&B MH SENIOR/ADOLESCENT

## 2019-12-11 RX ORDER — FUROSEMIDE 20 MG
20 TABLET ORAL ONCE
Status: COMPLETED | OUTPATIENT
Start: 2019-12-11 | End: 2019-12-11

## 2019-12-11 RX ORDER — FUROSEMIDE 10 MG/ML
20 INJECTION INTRAMUSCULAR; INTRAVENOUS ONCE
Status: DISCONTINUED | OUTPATIENT
Start: 2019-12-11 | End: 2019-12-11 | Stop reason: CLARIF

## 2019-12-11 RX ADMIN — PHENOBARBITAL 16.2 MG: 16.2 TABLET ORAL at 21:00

## 2019-12-11 RX ADMIN — DOCUSATE SODIUM 100 MG: 100 CAPSULE, LIQUID FILLED ORAL at 08:20

## 2019-12-11 RX ADMIN — PHENOBARBITAL 16.2 MG: 16.2 TABLET ORAL at 13:37

## 2019-12-11 RX ADMIN — DOCUSATE SODIUM 100 MG: 100 CAPSULE, LIQUID FILLED ORAL at 21:00

## 2019-12-11 RX ADMIN — PHENOBARBITAL 16.2 MG: 16.2 TABLET ORAL at 08:20

## 2019-12-11 RX ADMIN — BISACODYL 10 MG: 10 SUPPOSITORY RECTAL at 07:03

## 2019-12-11 RX ADMIN — Medication 0.5 MG: at 21:00

## 2019-12-11 RX ADMIN — Medication 2.5 MG: at 08:20

## 2019-12-11 RX ADMIN — Medication 0.5 MG: at 08:21

## 2019-12-11 RX ADMIN — ATORVASTATIN CALCIUM 20 MG: 20 TABLET, FILM COATED ORAL at 21:00

## 2019-12-11 RX ADMIN — FUROSEMIDE 40 MG: 40 TABLET ORAL at 08:20

## 2019-12-11 RX ADMIN — FUROSEMIDE 20 MG: 20 TABLET ORAL at 13:10

## 2019-12-11 ASSESSMENT — ACTIVITIES OF DAILY LIVING (ADL)
HYGIENE/GROOMING: INDEPENDENT;HANDWASHING
HYGIENE/GROOMING: WITH ASSISTANCE
DRESS: WITH ASSISTANCE;SCRUBS (BEHAVIORAL HEALTH)
DRESS: WITH ASSISTANCE
ORAL_HYGIENE: PROMPTS
ORAL_HYGIENE: PROMPTS
LAUNDRY: UNABLE TO COMPLETE

## 2019-12-11 ASSESSMENT — MIFFLIN-ST. JEOR: SCORE: 811.41

## 2019-12-11 NOTE — PROGRESS NOTES
12/11/19 1300   General Information   Special Considerations completed on 12-13-19   Clinical Impression   Affect Appropriate to situation   Orientation Needs further assessment;Oriented to person, place and time   Appearance and ADLs Needs physical assistance   Attention to Internal Stimuli No observed signs   Interaction Skills Initiates appropriately with staff;Interacts appropriately with peers   Ability to Communicate Needs Independent   Verbal Content Clear;Appropriate to topic   Ability to Maintain Boundaries Maintains appropriate physical boundaries;Maintains appropriate verbal boundaries   Participation Initiates participation   Concentration Concentrates 30+ minutes;Needs further assessment   Ability to Concentrate With structure   Follows and Comprehends Directions Independently follows 2 step verbal directions;Needs further assessment   Memory Needs further assessment   Organization Independently organizes medium tasks   Decision Making Independent   Planning and Problem Solving Needs further assessment;Independently plans ahead   Ability to Apply and Learn Concepts Applies within group structure   Frustrations / Stress Tolerance Independently identifies sources of frustration/stress;Needs further assessment   Level of Insight Insightful into needs, issues, goals;Needs further assessment   Self Esteem Can identify positives   Social Supports Identifies utilizing supports   General Observation/Plan   General Observations/Plan See Comments   Attended 2 of 2 OT groups on this day of 12-11-19. She was pleasant, initiated comments and answers in context of discussion. She relied on questions being typed on the computer for her to read and then to respond to with the answers. She participated on a creative and familiar task, was independent with decisions. Pt was given and completed a written self assessment. OT purpose was explained with the value of having involvement in treatment plan, and provided  options to meet self identified goals.  Plan: Will Provide structure, support, and encouragement. Offer education on coping strategies and life management skills. Assist pt to increase self awareness regarding mental health issues and encourage asking for assistance as needed. Will assess further in the areas of organization, problem solving, concentration.

## 2019-12-11 NOTE — PROGRESS NOTES
Pt went to the toilet x 2, no BM reported and feeling uneasy.  Came out for snacks, ate ice cream, pudding and drank ginger ale.  Social and pleasant with [facility] staff, laughing.  0703 Dulcolax suppository inserted, minimal stool felt upon insertion of suppository.

## 2019-12-11 NOTE — PROGRESS NOTES
[facility], [place]   Psychiatric Progress Note        Interim History:   From H&P: The patient is a 89-year-old woman who was admitted due to concerns of psychosis.  She requires a significant amount of cares from her family.  Here in the hospital, she seems extremely unstable on her feet.   Here, she has talked about knowing there are people living in her tree and that there are shadows that come into her room at night; however, she does not seem distressed by them.  She states that her family thinks she is crazy and her family is just jealous because the people in her tree and the shadows that come in at night, love her.  The patient denies that these shadows or people are problematic for her.  In fact, when she is talking to nursing, she would be curious statements about wondering how they managed to live outside, given it is so cold.  When I asked if there is anything I can do for her, she asked me just to believe her.  She was not agitated, not out of control.  Really, did not demonstrate any signs that she required this level of care.  She definitely is displaying psychosis but it appears that she likely could be managed at this point in time.  I believe that we will need to get further collateral directly from family for a better idea of exactly what we are treating.  The patient is extremely hard of hearing and thus the only way to communicate with her is either via writing or as we had done, use a computer with a word processing program and extremely large font that she can read.     The patient's care was discussed with the treatment team during the daily team meeting and/or staff's chart notes were reviewed.  Staff report patient has been calm, pleasant, cooperative. Patient is attending groups. Mood is good. She is bright and social. Denies AH. Endorses AH of voices that love her. Patient is eating well and taking medications as prescribed. Needs assist of 1 for transfer,  "walking and ADLs. Slept 6 hours. Had BM in AM.     Met with patient. Communicate by reading the questions and verbally responding. She remembers me and my role. Mood is good. Does not hear voices, \"But they were real\". Denies visual hallucinations since she was admitted. The patient states she wants to stay in the hospital; does not want to go home. Wants to have a place of her own. Agreeable to [facility]. Encourage walking, eating and drinking. Start tapering Phenobarbital.         Medications:       atorvastatin  20 mg Oral QPM     docusate sodium  100 mg Oral BID     fluPHENAZine  0.5 mg Oral BID     furosemide  40 mg Oral Daily     glipiZIDE  2.5 mg Oral QAM AC     insulin aspart  3 Units Subcutaneous Once     PHENobarbital  16.2 mg Oral TID          Allergies:     Allergies   Allergen Reactions     Strawberry Hives     Sulfa Drugs Nausea     mouth sores       Zomig [Zolmitriptan] Other (See Comments)     depression            Labs:     Recent Results (from the past 672 hour(s))   UA with Microscopic    Collection Time: 12/05/19 12:14 PM   Result Value Ref Range    Color Urine Yellow     Appearance Urine Clear     Glucose Urine Negative NEG^Negative mg/dL    Bilirubin Urine Negative NEG^Negative    Ketones Urine Negative NEG^Negative mg/dL    Specific Gravity Urine 1.012 1.003 - 1.035    Blood Urine Negative NEG^Negative    pH Urine 7.0 5.0 - 7.0 pH    Protein Albumin Urine Negative NEG^Negative mg/dL    Urobilinogen mg/dL 0.0 0.0 - 2.0 mg/dL    Nitrite Urine Negative NEG^Negative    Leukocyte Esterase Urine Small (A) NEG^Negative    Source Midstream Urine     WBC Urine 5 0 - 5 /HPF    RBC Urine 2 0 - 2 /HPF    Bacteria Urine Few (A) NEG^Negative /HPF    Mucous Urine Present (A) NEG^Negative /LPF   Urine Culture    Collection Time: 12/05/19 12:14 PM   Result Value Ref Range    Specimen Description Midstream Urine     Special Requests Specimen received in preservative     Culture Micro       10,000 to 50,000 " colonies/mL  mixed urogenital sincere     CBC with platelets differential    Collection Time: 12/05/19 12:30 PM   Result Value Ref Range    WBC 7.2 4.0 - 11.0 10e9/L    RBC Count 4.77 3.8 - 5.2 10e12/L    Hemoglobin 13.8 11.7 - 15.7 g/dL    Hematocrit 43.8 35.0 - 47.0 %    MCV 92 78 - 100 fl    MCH 28.9 26.5 - 33.0 pg    MCHC 31.5 31.5 - 36.5 g/dL    RDW 13.4 10.0 - 15.0 %    Platelet Count 168 150 - 450 10e9/L    Diff Method Automated Method     % Neutrophils 67.9 %    % Lymphocytes 20.4 %    % Monocytes 8.4 %    % Eosinophils 2.4 %    % Basophils 0.6 %    % Immature Granulocytes 0.3 %    Nucleated RBCs 0 0 /100    Absolute Neutrophil 4.9 1.6 - 8.3 10e9/L    Absolute Lymphocytes 1.5 0.8 - 5.3 10e9/L    Absolute Monocytes 0.6 0.0 - 1.3 10e9/L    Absolute Eosinophils 0.2 0.0 - 0.7 10e9/L    Absolute Basophils 0.0 0.0 - 0.2 10e9/L    Abs Immature Granulocytes 0.0 0 - 0.4 10e9/L    Absolute Nucleated RBC 0.0    Comprehensive metabolic panel    Collection Time: 12/05/19 12:30 PM   Result Value Ref Range    Sodium 144 133 - 144 mmol/L    Potassium 4.0 3.4 - 5.3 mmol/L    Chloride 106 94 - 109 mmol/L    Carbon Dioxide 30 20 - 32 mmol/L    Anion Gap 8 3 - 14 mmol/L    Glucose 114 (H) 70 - 99 mg/dL    Urea Nitrogen 25 7 - 30 mg/dL    Creatinine 1.14 (H) 0.52 - 1.04 mg/dL    GFR Estimate 41 (L) >60 mL/min/[1.73_m2]    GFR Estimate If Black 48 (L) >60 mL/min/[1.73_m2]    Calcium 9.9 8.5 - 10.1 mg/dL    Bilirubin Total 0.6 0.2 - 1.3 mg/dL    Albumin 3.8 3.4 - 5.0 g/dL    Protein Total 6.9 6.8 - 8.8 g/dL    Alkaline Phosphatase 83 40 - 150 U/L    ALT 35 0 - 50 U/L    AST 27 0 - 45 U/L   Glucose by meter    Collection Time: 12/06/19  8:39 PM   Result Value Ref Range    Glucose 176 (H) 70 - 99 mg/dL   Vitamin B12    Collection Time: 12/07/19  8:19 AM   Result Value Ref Range    Vitamin B12 517 193 - 986 pg/mL   Folate    Collection Time: 12/07/19  8:19 AM   Result Value Ref Range    Folate 12.7 >5.4 ng/mL   Vitamin D    Collection  Time: 12/07/19  8:19 AM   Result Value Ref Range    Vitamin D Deficiency screening 23 20 - 75 ug/L   Chlordiazepoxide serum blood level: Laboratory Miscellaneous Order    Collection Time: 12/07/19  8:19 AM   Result Value Ref Range    Miscellaneous Test         Specimen Received, Reordered and sent to Performing laboratory - Report to follow upon   completion.     Comprehensive metabolic panel    Collection Time: 12/07/19  8:19 AM   Result Value Ref Range    Sodium 138 133 - 144 mmol/L    Potassium 4.3 3.4 - 5.3 mmol/L    Chloride 103 94 - 109 mmol/L    Carbon Dioxide 32 20 - 32 mmol/L    Anion Gap 3 3 - 14 mmol/L    Glucose 166 (H) 70 - 99 mg/dL    Urea Nitrogen 30 7 - 30 mg/dL    Creatinine 1.16 (H) 0.52 - 1.04 mg/dL    GFR Estimate 41 (L) >60 mL/min/[1.73_m2]    GFR Estimate If Black 47 (L) >60 mL/min/[1.73_m2]    Calcium 9.3 8.5 - 10.1 mg/dL    Bilirubin Total 0.5 0.2 - 1.3 mg/dL    Albumin 3.4 3.4 - 5.0 g/dL    Protein Total 6.6 (L) 6.8 - 8.8 g/dL    Alkaline Phosphatase 93 40 - 150 U/L    ALT 30 0 - 50 U/L    AST 25 0 - 45 U/L   Hemoglobin A1c    Collection Time: 12/07/19  8:19 AM   Result Value Ref Range    Hemoglobin A1C 7.5 (H) 0 - 5.6 %   [place] Miscellaneous Test    Collection Time: 12/07/19  8:19 AM   Result Value Ref Range    Result PENDING     Test Name CHLORDIAZEPOXIDE AND METABOLITE SERUM      Send Outs Misc Test Code CDP     Send Outs Misc Test Specimen Serum    Glucose by meter    Collection Time: 12/07/19  5:08 PM   Result Value Ref Range    Glucose 271 (H) 70 - 99 mg/dL   Glucose by meter    Collection Time: 12/07/19  8:57 PM   Result Value Ref Range    Glucose 309 (H) 70 - 99 mg/dL   Glucose by meter    Collection Time: 12/07/19 11:12 PM   Result Value Ref Range    Glucose 182 (H) 70 - 99 mg/dL   Glucose by meter    Collection Time: 12/08/19  7:23 AM   Result Value Ref Range    Glucose 190 (H) 70 - 99 mg/dL   Glucose by meter    Collection Time: 12/08/19 12:10 PM   Result Value Ref Range    Glucose  113 (H) 70 - 99 mg/dL   Glucose by meter    Collection Time: 12/08/19  5:01 PM   Result Value Ref Range    Glucose 191 (H) 70 - 99 mg/dL   Glucose by meter    Collection Time: 12/09/19  7:22 AM   Result Value Ref Range    Glucose 136 (H) 70 - 99 mg/dL   Basic metabolic panel    Collection Time: 12/09/19  8:07 AM   Result Value Ref Range    Sodium 139 133 - 144 mmol/L    Potassium 3.6 3.4 - 5.3 mmol/L    Chloride 104 94 - 109 mmol/L    Carbon Dioxide 31 20 - 32 mmol/L    Anion Gap 4 3 - 14 mmol/L    Glucose 204 (H) 70 - 99 mg/dL    Urea Nitrogen 29 7 - 30 mg/dL    Creatinine 1.07 (H) 0.52 - 1.04 mg/dL    GFR Estimate 45 (L) >60 mL/min/[1.73_m2]    GFR Estimate If Black 52 (L) >60 mL/min/[1.73_m2]    Calcium 8.9 8.5 - 10.1 mg/dL   EKG 12-lead, complete    Collection Time: 12/09/19 11:59 AM   Result Value Ref Range    Interpretation ECG Click View Image link to view waveform and result    Glucose by meter    Collection Time: 12/09/19 12:04 PM   Result Value Ref Range    Glucose 171 (H) 70 - 99 mg/dL   Glucose by meter    Collection Time: 12/09/19  4:54 PM   Result Value Ref Range    Glucose 179 (H) 70 - 99 mg/dL   Glucose by meter    Collection Time: 12/10/19  8:00 AM   Result Value Ref Range    Glucose 111 (H) 70 - 99 mg/dL   Glucose by meter    Collection Time: 12/10/19 12:59 PM   Result Value Ref Range    Glucose 354 (H) 70 - 99 mg/dL   Glucose by meter    Collection Time: 12/10/19  5:15 PM   Result Value Ref Range    Glucose 196 (H) 70 - 99 mg/dL   Glucose by meter    Collection Time: 12/11/19  7:52 AM   Result Value Ref Range    Glucose 169 (H) 70 - 99 mg/dL            Psychiatric Examination:   Temp: 98.1  F (36.7  C) Temp src: Oral BP: 131/74 Pulse: 64   Resp: 16 SpO2: 95 % O2 Device: None (Room air)    Weight is 109 lbs 12.8 oz  Body mass index is 22.18 kg/m .    Appearance: awake, alert, cooperative, no apparent distress and thin  Attitude:  cooperative  Eye Contact:  good  Mood:  sad   Affect:  mood  congruent  Speech:  clear, coherent  Psychomotor Behavior:  no evidence of tardive dyskinesia, dystonia, or tics  Throught Process:  illogical  Associations:  no loose associations  Thought Content:  no evidence of suicidal ideation or homicidal ideation, no auditory hallucinations present and no visual hallucinations present  Insight:  fair  Judgement:  fair  Oriented to:  self and date only  Attention Span and Concentration:  fair  Recent and Remote Memory:  fair         Precautions:     Behavioral Orders   Procedures     Code 1 - Restrict to Unit     Fall precautions     Routine Programming     As clinically indicated     Single Room     Status 15     Every 15 minutes.     Status Individual Observation     Fall risk and impulsive, using medical bed     Order Specific Question:   CONTINUOUS 24 hours / day     Answer:   5 feet     Order Specific Question:   Indications for SIO     Answer:   Medical equipment / ligature risk     Status Individual Observation     High risk for falls; Pt. is impulsive.     Order Specific Question:   CONTINUOUS 24 hours / day     Answer:   5 feet     Order Specific Question:   Indications for SIO     Answer:   Medical equipment / ligature risk     Withdrawal precautions          DIagnoses:   1.  Schizophrenia by history.   2.  Rule out cognitive impairment due to chronic dementia-type process.   3.  Extremely hard of hearing.   4.  Type 2 diabetes.   5.  Chronic kidney disease.   6.  Heart failure.   7.  Hypertension.          Plan:   --Discontinue Librium  --Start Phenobarbital taper   --Continue Prolixin 0.5 mg, bid.   --PRN Hydroxyzine, Zyprexa, Trazodone.   --MSSA for benzo withdrawal  --Blood work was reviewed  --IM to follow up for medical problems.      Disposition Plan   Reason for ongoing admission: is unable to care for self due to schizophrenia and likely dementia   Disposition: LTC  Estimated length of stay: 7-10 days  Legal Status:  voluntary  Discharge will be granted  once symptoms improved.    Celestino NAM CNP  Date: 12/11/19  Time: 12:18 PM

## 2019-12-11 NOTE — PROGRESS NOTES
Pt has been pleasant and cooperative. Pt denies depression. Pt denies SI/SIB. Pt does reports some anxiety as she does not know where she will be going to live after discharge. Pt denies hallucinations. Pt does state that she was having hallucinations prior to admission.   Pt has been eating well. Pt likes sweets and has been taking her medications with pudding.   Pt has been medication compliant.   Pt has been ambulating with walker, gait belt and SBA of 1 staff member.   Pt has not used hedy chair this shift and has been ambulating in the diaz. Pt has been using foot stool to elevate her legs.   Pt has had two medium formed stools.

## 2019-12-11 NOTE — PROGRESS NOTES
"Pt is pleasant and cooperative.  Affect is bright.  States she feels \"pretty good - I feel like I am getting better - except that I am 89 years old.\"  Communicated with peers and staff in the lounge with staff doing translation via word processing on computer in large font.  Smiling and laughing.  Worked on drawing in the lounge with peers.  Ambulated the hallway x 2 using walker, SBA x 1 with gait belt.  Strength and balance improving.  Requires assist x 1 for transfers and toileting.  Denies SI/SIB.  Endorses AH as \"yes, but they are my friends, they love me.\"  Denies VH.  Encouraging fluids.  Appetite is good.      Coccyx / gluteal area is intact, lightly reddened.  Area cleansed and barrier cream applied.  Vadim interventions in place - rotated in bed and chair per WOC orders.    BLE Edema:  Slight pitting on R, +1-2 on L  Urine output x 3, no BM this shift.      MSSA:  1 / 2  "

## 2019-12-11 NOTE — PROGRESS NOTES
"Brief Medicine Note    Contacted by nursing regarding 3lb weight gain over past 2 days. Pt hx of HFpEF managed with lasix 40 mg daily. Trace bilateral edema noted on exam. Will order lasix 20 mg PO x 1 and continue to follow.         /74   Pulse 64   Temp 97.1  F (36.2  C) (Tympanic)   Resp 16   Ht 1.499 m (4' 11\")   Wt 49.6 kg (109 lb 4.8 oz)   SpO2 98%   BMI 22.08 kg/m    General: A&O. NAD.   Lungs: CTAB  Cards: RRR  Extremities: Trace bilateral LE edema          Narda Lay PA-C  Hospitalist Service  Pager [phone]      "

## 2019-12-11 NOTE — PROGRESS NOTES
"Pt stated she prefers her old medication, Librium, as it allowed her to be more independent. Pt stated with her new medication she has difficulty gripping objects and that she has more pronounced tremors. Pt stated \"I'm too old to experiment.\"    Addendum:  Pt stated shortly after, \"Actually I think I was hearing voices with the Librium, so nevermind that. I'll stick with the new medication\"  "

## 2019-12-12 LAB
GLUCOSE BLDC GLUCOMTR-MCNC: 157 MG/DL (ref 70–99)
GLUCOSE BLDC GLUCOMTR-MCNC: 212 MG/DL (ref 70–99)

## 2019-12-12 PROCEDURE — 25000132 ZZH RX MED GY IP 250 OP 250 PS 637: Mod: GY | Performed by: PHYSICIAN ASSISTANT

## 2019-12-12 PROCEDURE — 00000146 ZZHCL STATISTIC GLUCOSE BY METER IP

## 2019-12-12 PROCEDURE — G0177 OPPS/PHP; TRAIN & EDUC SERV: HCPCS

## 2019-12-12 PROCEDURE — 25000132 ZZH RX MED GY IP 250 OP 250 PS 637: Mod: GY | Performed by: PSYCHIATRY & NEUROLOGY

## 2019-12-12 PROCEDURE — 12400002 ZZH R&B MH SENIOR/ADOLESCENT

## 2019-12-12 PROCEDURE — 99232 SBSQ HOSP IP/OBS MODERATE 35: CPT | Performed by: NURSE PRACTITIONER

## 2019-12-12 PROCEDURE — 25000132 ZZH RX MED GY IP 250 OP 250 PS 637: Mod: GY | Performed by: NURSE PRACTITIONER

## 2019-12-12 RX ORDER — FUROSEMIDE 40 MG
40 TABLET ORAL ONCE
Status: COMPLETED | OUTPATIENT
Start: 2019-12-12 | End: 2019-12-12

## 2019-12-12 RX ADMIN — Medication 0.5 MG: at 20:11

## 2019-12-12 RX ADMIN — PHENOBARBITAL 16.2 MG: 16.2 TABLET ORAL at 20:11

## 2019-12-12 RX ADMIN — DOCUSATE SODIUM 100 MG: 100 CAPSULE, LIQUID FILLED ORAL at 08:14

## 2019-12-12 RX ADMIN — ATORVASTATIN CALCIUM 20 MG: 20 TABLET, FILM COATED ORAL at 20:11

## 2019-12-12 RX ADMIN — FUROSEMIDE 40 MG: 40 TABLET ORAL at 08:14

## 2019-12-12 RX ADMIN — PHENOBARBITAL 16.2 MG: 16.2 TABLET ORAL at 08:14

## 2019-12-12 RX ADMIN — PHENOBARBITAL 16.2 MG: 16.2 TABLET ORAL at 14:41

## 2019-12-12 RX ADMIN — Medication 0.5 MG: at 08:16

## 2019-12-12 RX ADMIN — FUROSEMIDE 40 MG: 40 TABLET ORAL at 14:41

## 2019-12-12 RX ADMIN — Medication 2.5 MG: at 08:14

## 2019-12-12 RX ADMIN — DOCUSATE SODIUM 100 MG: 100 CAPSULE, LIQUID FILLED ORAL at 20:11

## 2019-12-12 ASSESSMENT — ACTIVITIES OF DAILY LIVING (ADL)
ORAL_HYGIENE: PROMPTS
DRESS: STREET CLOTHES;WITH ASSISTANCE
LAUNDRY: UNABLE TO COMPLETE
HYGIENE/GROOMING: WITH ASSISTANCE
LAUNDRY: UNABLE TO COMPLETE
ORAL_HYGIENE: PROMPTS
DRESS: STREET CLOTHES
HYGIENE/GROOMING: WITH ASSISTANCE

## 2019-12-12 ASSESSMENT — MIFFLIN-ST. JEOR: SCORE: 815.49

## 2019-12-12 NOTE — PROGRESS NOTES
[facility], [place]   Psychiatric Progress Note        Interim History:   From H&P: The patient is a 89-year-old woman who was admitted due to concerns of psychosis.  She requires a significant amount of cares from her family.  Here in the hospital, she seems extremely unstable on her feet.   Here, she has talked about knowing there are people living in her tree and that there are shadows that come into her room at night; however, she does not seem distressed by them.  She states that her family thinks she is crazy and her family is just jealous because the people in her tree and the shadows that come in at night, love her.  The patient denies that these shadows or people are problematic for her.  In fact, when she is talking to nursing, she would be curious statements about wondering how they managed to live outside, given it is so cold.  When I asked if there is anything I can do for her, she asked me just to believe her.  She was not agitated, not out of control.  Really, did not demonstrate any signs that she required this level of care.  She definitely is displaying psychosis but it appears that she likely could be managed at this point in time.  I believe that we will need to get further collateral directly from family for a better idea of exactly what we are treating.  The patient is extremely hard of hearing and thus the only way to communicate with her is either via writing or as we had done, use a computer with a word processing program and extremely large font that she can read.     The patient's care was discussed with the treatment team during the daily team meeting and/or staff's chart notes were reviewed.  Staff report patient has been calm, pleasant, cooperative. Patient is attending groups. Mood is good. She is bright and social. Denies AH. Endorses AH of voices that love her. Patient is eating well and taking medications as prescribed. Needs assist of 1 for transfer,  walking and ADLs. Slept 6 hours. Had BM in AM.     Met with patient. No changes from yesterday. Waiting for placement.          Medications:       atorvastatin  20 mg Oral QPM     docusate sodium  100 mg Oral BID     fluPHENAZine  0.5 mg Oral BID     furosemide  40 mg Oral Daily     glipiZIDE  2.5 mg Oral QAM AC     insulin aspart  3 Units Subcutaneous Once     PHENobarbital  16.2 mg Oral TID          Allergies:     Allergies   Allergen Reactions     Strawberry Hives     Sulfa Drugs Nausea     mouth sores       Zomig [Zolmitriptan] Other (See Comments)     depression            Labs:     Recent Results (from the past 672 hour(s))   UA with Microscopic    Collection Time: 12/05/19 12:14 PM   Result Value Ref Range    Color Urine Yellow     Appearance Urine Clear     Glucose Urine Negative NEG^Negative mg/dL    Bilirubin Urine Negative NEG^Negative    Ketones Urine Negative NEG^Negative mg/dL    Specific Gravity Urine 1.012 1.003 - 1.035    Blood Urine Negative NEG^Negative    pH Urine 7.0 5.0 - 7.0 pH    Protein Albumin Urine Negative NEG^Negative mg/dL    Urobilinogen mg/dL 0.0 0.0 - 2.0 mg/dL    Nitrite Urine Negative NEG^Negative    Leukocyte Esterase Urine Small (A) NEG^Negative    Source Midstream Urine     WBC Urine 5 0 - 5 /HPF    RBC Urine 2 0 - 2 /HPF    Bacteria Urine Few (A) NEG^Negative /HPF    Mucous Urine Present (A) NEG^Negative /LPF   Urine Culture    Collection Time: 12/05/19 12:14 PM   Result Value Ref Range    Specimen Description Midstream Urine     Special Requests Specimen received in preservative     Culture Micro       10,000 to 50,000 colonies/mL  mixed urogenital sincere     CBC with platelets differential    Collection Time: 12/05/19 12:30 PM   Result Value Ref Range    WBC 7.2 4.0 - 11.0 10e9/L    RBC Count 4.77 3.8 - 5.2 10e12/L    Hemoglobin 13.8 11.7 - 15.7 g/dL    Hematocrit 43.8 35.0 - 47.0 %    MCV 92 78 - 100 fl    MCH 28.9 26.5 - 33.0 pg    MCHC 31.5 31.5 - 36.5 g/dL    RDW 13.4 10.0  - 15.0 %    Platelet Count 168 150 - 450 10e9/L    Diff Method Automated Method     % Neutrophils 67.9 %    % Lymphocytes 20.4 %    % Monocytes 8.4 %    % Eosinophils 2.4 %    % Basophils 0.6 %    % Immature Granulocytes 0.3 %    Nucleated RBCs 0 0 /100    Absolute Neutrophil 4.9 1.6 - 8.3 10e9/L    Absolute Lymphocytes 1.5 0.8 - 5.3 10e9/L    Absolute Monocytes 0.6 0.0 - 1.3 10e9/L    Absolute Eosinophils 0.2 0.0 - 0.7 10e9/L    Absolute Basophils 0.0 0.0 - 0.2 10e9/L    Abs Immature Granulocytes 0.0 0 - 0.4 10e9/L    Absolute Nucleated RBC 0.0    Comprehensive metabolic panel    Collection Time: 12/05/19 12:30 PM   Result Value Ref Range    Sodium 144 133 - 144 mmol/L    Potassium 4.0 3.4 - 5.3 mmol/L    Chloride 106 94 - 109 mmol/L    Carbon Dioxide 30 20 - 32 mmol/L    Anion Gap 8 3 - 14 mmol/L    Glucose 114 (H) 70 - 99 mg/dL    Urea Nitrogen 25 7 - 30 mg/dL    Creatinine 1.14 (H) 0.52 - 1.04 mg/dL    GFR Estimate 41 (L) >60 mL/min/[1.73_m2]    GFR Estimate If Black 48 (L) >60 mL/min/[1.73_m2]    Calcium 9.9 8.5 - 10.1 mg/dL    Bilirubin Total 0.6 0.2 - 1.3 mg/dL    Albumin 3.8 3.4 - 5.0 g/dL    Protein Total 6.9 6.8 - 8.8 g/dL    Alkaline Phosphatase 83 40 - 150 U/L    ALT 35 0 - 50 U/L    AST 27 0 - 45 U/L   Glucose by meter    Collection Time: 12/06/19  8:39 PM   Result Value Ref Range    Glucose 176 (H) 70 - 99 mg/dL   Fluphenazine level    Collection Time: 12/07/19  8:19 AM   Result Value Ref Range    Fluphenazine Level <0.2 (L) 1.0 - 10.0 ng/mL   Vitamin B12    Collection Time: 12/07/19  8:19 AM   Result Value Ref Range    Vitamin B12 517 193 - 986 pg/mL   Folate    Collection Time: 12/07/19  8:19 AM   Result Value Ref Range    Folate 12.7 >5.4 ng/mL   Vitamin D    Collection Time: 12/07/19  8:19 AM   Result Value Ref Range    Vitamin D Deficiency screening 23 20 - 75 ug/L   Chlordiazepoxide serum blood level: Laboratory Miscellaneous Order    Collection Time: 12/07/19  8:19 AM   Result Value Ref Range     Miscellaneous Test         Specimen Received, Reordered and sent to Performing laboratory - Report to follow upon   completion.     Comprehensive metabolic panel    Collection Time: 12/07/19  8:19 AM   Result Value Ref Range    Sodium 138 133 - 144 mmol/L    Potassium 4.3 3.4 - 5.3 mmol/L    Chloride 103 94 - 109 mmol/L    Carbon Dioxide 32 20 - 32 mmol/L    Anion Gap 3 3 - 14 mmol/L    Glucose 166 (H) 70 - 99 mg/dL    Urea Nitrogen 30 7 - 30 mg/dL    Creatinine 1.16 (H) 0.52 - 1.04 mg/dL    GFR Estimate 41 (L) >60 mL/min/[1.73_m2]    GFR Estimate If Black 47 (L) >60 mL/min/[1.73_m2]    Calcium 9.3 8.5 - 10.1 mg/dL    Bilirubin Total 0.5 0.2 - 1.3 mg/dL    Albumin 3.4 3.4 - 5.0 g/dL    Protein Total 6.6 (L) 6.8 - 8.8 g/dL    Alkaline Phosphatase 93 40 - 150 U/L    ALT 30 0 - 50 U/L    AST 25 0 - 45 U/L   Hemoglobin A1c    Collection Time: 12/07/19  8:19 AM   Result Value Ref Range    Hemoglobin A1C 7.5 (H) 0 - 5.6 %   Mcdaniel Miscellaneous Test    Collection Time: 12/07/19  8:19 AM   Result Value Ref Range    Result PENDING     Test Name CHLORDIAZEPOXIDE AND METABOLITE SERUM      Send Outs Misc Test Code CDP     Send Outs Misc Test Specimen Serum    Glucose by meter    Collection Time: 12/07/19  5:08 PM   Result Value Ref Range    Glucose 271 (H) 70 - 99 mg/dL   Glucose by meter    Collection Time: 12/07/19  8:57 PM   Result Value Ref Range    Glucose 309 (H) 70 - 99 mg/dL   Glucose by meter    Collection Time: 12/07/19 11:12 PM   Result Value Ref Range    Glucose 182 (H) 70 - 99 mg/dL   Glucose by meter    Collection Time: 12/08/19  7:23 AM   Result Value Ref Range    Glucose 190 (H) 70 - 99 mg/dL   Glucose by meter    Collection Time: 12/08/19 12:10 PM   Result Value Ref Range    Glucose 113 (H) 70 - 99 mg/dL   Glucose by meter    Collection Time: 12/08/19  5:01 PM   Result Value Ref Range    Glucose 191 (H) 70 - 99 mg/dL   Glucose by meter    Collection Time: 12/09/19  7:22 AM   Result Value Ref Range    Glucose 136  (H) 70 - 99 mg/dL   Basic metabolic panel    Collection Time: 12/09/19  8:07 AM   Result Value Ref Range    Sodium 139 133 - 144 mmol/L    Potassium 3.6 3.4 - 5.3 mmol/L    Chloride 104 94 - 109 mmol/L    Carbon Dioxide 31 20 - 32 mmol/L    Anion Gap 4 3 - 14 mmol/L    Glucose 204 (H) 70 - 99 mg/dL    Urea Nitrogen 29 7 - 30 mg/dL    Creatinine 1.07 (H) 0.52 - 1.04 mg/dL    GFR Estimate 45 (L) >60 mL/min/[1.73_m2]    GFR Estimate If Black 52 (L) >60 mL/min/[1.73_m2]    Calcium 8.9 8.5 - 10.1 mg/dL   EKG 12-lead, complete    Collection Time: 12/09/19 11:59 AM   Result Value Ref Range    Interpretation ECG Click View Image link to view waveform and result    Glucose by meter    Collection Time: 12/09/19 12:04 PM   Result Value Ref Range    Glucose 171 (H) 70 - 99 mg/dL   Glucose by meter    Collection Time: 12/09/19  4:54 PM   Result Value Ref Range    Glucose 179 (H) 70 - 99 mg/dL   Glucose by meter    Collection Time: 12/10/19  8:00 AM   Result Value Ref Range    Glucose 111 (H) 70 - 99 mg/dL   Glucose by meter    Collection Time: 12/10/19 12:59 PM   Result Value Ref Range    Glucose 354 (H) 70 - 99 mg/dL   Glucose by meter    Collection Time: 12/10/19  5:15 PM   Result Value Ref Range    Glucose 196 (H) 70 - 99 mg/dL   Glucose by meter    Collection Time: 12/11/19  7:52 AM   Result Value Ref Range    Glucose 169 (H) 70 - 99 mg/dL   Glucose by meter    Collection Time: 12/11/19  5:25 PM   Result Value Ref Range    Glucose 184 (H) 70 - 99 mg/dL   Glucose by meter    Collection Time: 12/12/19  7:47 AM   Result Value Ref Range    Glucose 157 (H) 70 - 99 mg/dL            Psychiatric Examination:   Temp: 98.7  F (37.1  C) Temp src: Oral BP: (!) 140/63 Pulse: 60   Resp: 16 SpO2: 95 % O2 Device: None (Room air)    Weight is 109 lbs 4.8 oz  Body mass index is 22.08 kg/m .    Appearance: awake, alert, cooperative, no apparent distress and thin  Attitude:  cooperative  Eye Contact:  good  Mood:  sad   Affect:  mood  congruent  Speech:  clear, coherent  Psychomotor Behavior:  no evidence of tardive dyskinesia, dystonia, or tics  Throught Process:  illogical  Associations:  no loose associations  Thought Content:  no evidence of suicidal ideation or homicidal ideation, no auditory hallucinations present and no visual hallucinations present  Insight:  fair  Judgement:  fair  Oriented to:  self and date only  Attention Span and Concentration:  fair  Recent and Remote Memory:  fair         Precautions:     Behavioral Orders   Procedures     Code 1 - Restrict to Unit     Fall precautions     Routine Programming     As clinically indicated     Single Room     Status 15     Every 15 minutes.     Status Individual Observation     High risk for falls; Pt. is impulsive.     Order Specific Question:   CONTINUOUS 24 hours / day     Answer:   5 feet     Order Specific Question:   Indications for SIO     Answer:   Medical equipment / ligature risk     Withdrawal precautions          DIagnoses:   1.  Schizophrenia by history.   2.  Rule out cognitive impairment due to chronic dementia-type process.   3.  Extremely hard of hearing.   4.  Type 2 diabetes.   5.  Chronic kidney disease.   6.  Heart failure.   7.  Hypertension.          Plan:   --Discontinue Librium  --Start Phenobarbital taper   --Continue Prolixin 0.5 mg, bid.   --PRN Hydroxyzine, Zyprexa, Trazodone.   --MSSA for benzo withdrawal  --Blood work was reviewed  --IM to follow up for medical problems.      Disposition Plan   Reason for ongoing admission: is unable to care for self due to schizophrenia and likely dementia   Disposition: LTC  Estimated length of stay: 7-10 days  Legal Status:  voluntary  Discharge will be granted once symptoms improved.    Celestino NAM CNP  Date: 12/12/19  Time: 11:46 AM

## 2019-12-12 NOTE — PLAN OF CARE
48 hour nurse assess  Continues on a 24 hr sio.Patient is alert and oriented. Denies any pain or discomfort. Denies any medical concerns. Reports no noted side effects from medications. Denies si/ sib/ hallucinations.Reports that sleep and appetite are good. Patient is progressing towards goals. Continue to encourage participation in groups and developing healthy coping skills.Will continue  to work towards discharge goals.

## 2019-12-12 NOTE — PROGRESS NOTES
12/11/19 2100   Therapeutic Recreation   Type of Intervention structured groups   Activity game   Response Participates, initiates socially appropriate   Hours 1     Pt participated in Therapeutic Recreation group with focus on stress reduction, socialization, cognitive processes, and leisure participation. Engaged and cooperative in group recreational intervention; word puzzles. Pt participated throughout entire duration of the group. Showed progress in session goals. Pt affect was flat and mood was calm. Pt affect brightened slightly during the activity.

## 2019-12-12 NOTE — PROGRESS NOTES
Patient has been visible in the milieu. Calm and cooperative. Pt attended community meeting and participated with the help of an .     Patient denied SI/SIB/AVH. Pt denied anxiety and depression.    Pt endorsed poor sleep due to waking up early.    Pt was assisted in taking a shower this evening. Pt stated that she felt really good after the shower.    Blood glucose was 184. Good appetite.    MSSA was 3.    Pt has been medication compliant. Took medications with applesauce. Pt denied medication side effects.    Coping skill: art.    No behavioral issues noted.

## 2019-12-12 NOTE — PLAN OF CARE
PT: Patient has been on PT schedule multiple times but no ability to see patient d/t lack of CART interpreting service. Due to safety and fall risk cannot communicate through writing, will need CART. Will place on hold tomorrow and check back in to see when one is to be scheduled next.

## 2019-12-12 NOTE — PLAN OF CARE
Problem: OT General Care Plan  Goal: OT Goal 1  Description  Will attend OT groups and participate actively in all OT opportunities. Will assess and set goals.     Note:   Attended 2 of 2 OT groups. Affect brightened with interactions which she initiated. She asked for supplies needed and appeared organized in the process of her familiar work task. She appears comfortable and interested in taking an active role in all opportunities.       She attended the afternoon OT group on the topic of Gratitude, people, events, and       experiences feeling thankfulness for. She offered answers in context and relied on the questions being interpreted through written word. Affect appeared bright with interactions. 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

## 2019-12-12 NOTE — PROGRESS NOTES
"Brief Medicine Note    IM following for blood pressure and volume status. Has had 4 lb weight gain over the past 4 days. Received additional 20 mg lasix PO yesterday without significant response. Will give additional 40 mg Lasix PO x 1 today and monitor response.     Today's vital signs, medications, and nursing notes were reviewed.     BP (!) 140/63   Pulse 60   Temp 97.2  F (36.2  C) (Tympanic)   Resp 16   Ht 1.499 m (4' 11\")   Wt 50 kg (110 lb 3.2 oz)   SpO2 97%   BMI 22.26 kg/m        Narda Lay PA-C  Hospitalist Service  Pager [phone]      "

## 2019-12-13 ENCOUNTER — APPOINTMENT (OUTPATIENT)
Dept: PHYSICAL THERAPY | Facility: CLINIC | Age: 84
DRG: 885 | End: 2019-12-13
Attending: PSYCHIATRY & NEUROLOGY
Payer: MEDICARE

## 2019-12-13 LAB
GLUCOSE BLDC GLUCOMTR-MCNC: 199 MG/DL (ref 70–99)
GLUCOSE BLDC GLUCOMTR-MCNC: 203 MG/DL (ref 70–99)

## 2019-12-13 PROCEDURE — 25000132 ZZH RX MED GY IP 250 OP 250 PS 637: Mod: GY | Performed by: PHYSICIAN ASSISTANT

## 2019-12-13 PROCEDURE — 25000132 ZZH RX MED GY IP 250 OP 250 PS 637: Mod: GY | Performed by: PSYCHIATRY & NEUROLOGY

## 2019-12-13 PROCEDURE — 12400002 ZZH R&B MH SENIOR/ADOLESCENT

## 2019-12-13 PROCEDURE — 97116 GAIT TRAINING THERAPY: CPT | Mod: GP

## 2019-12-13 PROCEDURE — 25000132 ZZH RX MED GY IP 250 OP 250 PS 637: Mod: GY | Performed by: NURSE PRACTITIONER

## 2019-12-13 PROCEDURE — 00000146 ZZHCL STATISTIC GLUCOSE BY METER IP

## 2019-12-13 PROCEDURE — 99232 SBSQ HOSP IP/OBS MODERATE 35: CPT | Performed by: NURSE PRACTITIONER

## 2019-12-13 PROCEDURE — G0177 OPPS/PHP; TRAIN & EDUC SERV: HCPCS

## 2019-12-13 PROCEDURE — 97530 THERAPEUTIC ACTIVITIES: CPT | Mod: GP

## 2019-12-13 RX ADMIN — DOCUSATE SODIUM 100 MG: 100 CAPSULE, LIQUID FILLED ORAL at 08:20

## 2019-12-13 RX ADMIN — Medication 2.5 MG: at 08:20

## 2019-12-13 RX ADMIN — Medication 0.5 MG: at 08:20

## 2019-12-13 RX ADMIN — DOCUSATE SODIUM 100 MG: 100 CAPSULE, LIQUID FILLED ORAL at 21:30

## 2019-12-13 RX ADMIN — FUROSEMIDE 40 MG: 40 TABLET ORAL at 08:20

## 2019-12-13 RX ADMIN — Medication 8.1 MG: at 08:20

## 2019-12-13 RX ADMIN — Medication 8.1 MG: at 21:30

## 2019-12-13 RX ADMIN — Medication 0.5 MG: at 21:31

## 2019-12-13 RX ADMIN — Medication 8.1 MG: at 13:32

## 2019-12-13 RX ADMIN — ATORVASTATIN CALCIUM 20 MG: 20 TABLET, FILM COATED ORAL at 21:30

## 2019-12-13 ASSESSMENT — ACTIVITIES OF DAILY LIVING (ADL)
HYGIENE/GROOMING: INDEPENDENT
DRESS: INDEPENDENT
ORAL_HYGIENE: INDEPENDENT

## 2019-12-13 NOTE — PROGRESS NOTES
Patient is alert and oriented x 4. Denies any pain or discomfort. Denies any medical concerns. Reports no noted side effects from medications. Denies si/ sib/ hallucinations. Reports that both sleep and appetite are good. Continues on the 24 hr sio.Patient is progressing towards goals. Continue to encourage participation in groups and developing healthy coping skills.Will continue  to work towards discharge goals.      Endorsing runny nose. Requested a medication for it. Per Np,  to watch it through weekend and see how pt is doing then. Writer relayed that info to patient  Who was ok with that.

## 2019-12-13 NOTE — PROGRESS NOTES
"Pt requested medication to alleviate a \"runny nose.\" Writer observes stream of mucus suddenly running down Pt's nostril relatively often.  "

## 2019-12-13 NOTE — PROGRESS NOTES
Patient looks happy this evening. She was present in the lounge more than half of the shift until she took her bedtime meds. She enjoyed playing the piano while in the lounge. She ate good during dinner and took all her meds with vanilla pudding. Patient also watched TV together with other patients on the unit. Patient denied SI/SIB. She was assisted to the toilet x 2. She walked in her room using her WW. Patient was repositioned every 2 hours while in the chair.

## 2019-12-13 NOTE — PLAN OF CARE
"  Problem: OT General Care Plan  Goal: OT Goal 1  Description  Will attend OT groups and participate actively in all OT opportunities. Will assess and set goals.     Note:   Attended 1 of 2 OT groups. She worked independently on a task while seated and after supplies she requested were gathered for her. She was pleasant, quicker in referring to the monitor screen on cue to read the spoken and written information author was stating. Affect brightened with interactions. She appears interested in being involved. She also appears to need assistance with some of her self cares as she requested \"help with Bathroom\" breaks.      "

## 2019-12-13 NOTE — PROGRESS NOTES
[facility], [place]   Psychiatric Progress Note        Interim History:   From H&P: The patient is a 89-year-old woman who was admitted due to concerns of psychosis.  She requires a significant amount of cares from her family.  Here in the hospital, she seems extremely unstable on her feet.   Here, she has talked about knowing there are people living in her tree and that there are shadows that come into her room at night; however, she does not seem distressed by them.  She states that her family thinks she is crazy and her family is just jealous because the people in her tree and the shadows that come in at night, love her.  The patient denies that these shadows or people are problematic for her.  In fact, when she is talking to nursing, she would be curious statements about wondering how they managed to live outside, given it is so cold.  When I asked if there is anything I can do for her, she asked me just to believe her.  She was not agitated, not out of control.  Really, did not demonstrate any signs that she required this level of care.  She definitely is displaying psychosis but it appears that she likely could be managed at this point in time.  I believe that we will need to get further collateral directly from family for a better idea of exactly what we are treating.  The patient is extremely hard of hearing and thus the only way to communicate with her is either via writing or as we had done, use a computer with a word processing program and extremely large font that she can read.     The patient's care was discussed with the treatment team during the daily team meeting and/or staff's chart notes were reviewed.  Staff report patient has been calm, pleasant, cooperative. Patient is attending groups. Mood is good. She is bright and social. Denies AH/VH. Patient is eating well and taking medications as prescribed. Needs assist of 1 for transfer, walking and ADLs. Slept 6 hours.      Met with patient. No changes from yesterday. Waiting for placement.Sleep wasn't very good last night but this is normal for her. Denies AH/VH. Denies any mental health symptoms. Appetite is intact.          Medications:       atorvastatin  20 mg Oral QPM     docusate sodium  100 mg Oral BID     fluPHENAZine  0.5 mg Oral BID     furosemide  40 mg Oral Daily     glipiZIDE  2.5 mg Oral QAM AC     insulin aspart  3 Units Subcutaneous Once     PHENobarbital  16.2 mg Oral TID          Allergies:     Allergies   Allergen Reactions     Strawberry Hives     Sulfa Drugs Nausea     mouth sores       Zomig [Zolmitriptan] Other (See Comments)     depression            Labs:     Recent Results (from the past 672 hour(s))   UA with Microscopic    Collection Time: 12/05/19 12:14 PM   Result Value Ref Range    Color Urine Yellow     Appearance Urine Clear     Glucose Urine Negative NEG^Negative mg/dL    Bilirubin Urine Negative NEG^Negative    Ketones Urine Negative NEG^Negative mg/dL    Specific Gravity Urine 1.012 1.003 - 1.035    Blood Urine Negative NEG^Negative    pH Urine 7.0 5.0 - 7.0 pH    Protein Albumin Urine Negative NEG^Negative mg/dL    Urobilinogen mg/dL 0.0 0.0 - 2.0 mg/dL    Nitrite Urine Negative NEG^Negative    Leukocyte Esterase Urine Small (A) NEG^Negative    Source Midstream Urine     WBC Urine 5 0 - 5 /HPF    RBC Urine 2 0 - 2 /HPF    Bacteria Urine Few (A) NEG^Negative /HPF    Mucous Urine Present (A) NEG^Negative /LPF   Urine Culture    Collection Time: 12/05/19 12:14 PM   Result Value Ref Range    Specimen Description Midstream Urine     Special Requests Specimen received in preservative     Culture Micro       10,000 to 50,000 colonies/mL  mixed urogenital sincere     CBC with platelets differential    Collection Time: 12/05/19 12:30 PM   Result Value Ref Range    WBC 7.2 4.0 - 11.0 10e9/L    RBC Count 4.77 3.8 - 5.2 10e12/L    Hemoglobin 13.8 11.7 - 15.7 g/dL    Hematocrit 43.8 35.0 - 47.0 %    MCV 92 78  - 100 fl    MCH 28.9 26.5 - 33.0 pg    MCHC 31.5 31.5 - 36.5 g/dL    RDW 13.4 10.0 - 15.0 %    Platelet Count 168 150 - 450 10e9/L    Diff Method Automated Method     % Neutrophils 67.9 %    % Lymphocytes 20.4 %    % Monocytes 8.4 %    % Eosinophils 2.4 %    % Basophils 0.6 %    % Immature Granulocytes 0.3 %    Nucleated RBCs 0 0 /100    Absolute Neutrophil 4.9 1.6 - 8.3 10e9/L    Absolute Lymphocytes 1.5 0.8 - 5.3 10e9/L    Absolute Monocytes 0.6 0.0 - 1.3 10e9/L    Absolute Eosinophils 0.2 0.0 - 0.7 10e9/L    Absolute Basophils 0.0 0.0 - 0.2 10e9/L    Abs Immature Granulocytes 0.0 0 - 0.4 10e9/L    Absolute Nucleated RBC 0.0    Comprehensive metabolic panel    Collection Time: 12/05/19 12:30 PM   Result Value Ref Range    Sodium 144 133 - 144 mmol/L    Potassium 4.0 3.4 - 5.3 mmol/L    Chloride 106 94 - 109 mmol/L    Carbon Dioxide 30 20 - 32 mmol/L    Anion Gap 8 3 - 14 mmol/L    Glucose 114 (H) 70 - 99 mg/dL    Urea Nitrogen 25 7 - 30 mg/dL    Creatinine 1.14 (H) 0.52 - 1.04 mg/dL    GFR Estimate 41 (L) >60 mL/min/[1.73_m2]    GFR Estimate If Black 48 (L) >60 mL/min/[1.73_m2]    Calcium 9.9 8.5 - 10.1 mg/dL    Bilirubin Total 0.6 0.2 - 1.3 mg/dL    Albumin 3.8 3.4 - 5.0 g/dL    Protein Total 6.9 6.8 - 8.8 g/dL    Alkaline Phosphatase 83 40 - 150 U/L    ALT 35 0 - 50 U/L    AST 27 0 - 45 U/L   Glucose by meter    Collection Time: 12/06/19  8:39 PM   Result Value Ref Range    Glucose 176 (H) 70 - 99 mg/dL   Fluphenazine level    Collection Time: 12/07/19  8:19 AM   Result Value Ref Range    Fluphenazine Level <0.2 (L) 1.0 - 10.0 ng/mL   Vitamin B12    Collection Time: 12/07/19  8:19 AM   Result Value Ref Range    Vitamin B12 517 193 - 986 pg/mL   Folate    Collection Time: 12/07/19  8:19 AM   Result Value Ref Range    Folate 12.7 >5.4 ng/mL   Vitamin D    Collection Time: 12/07/19  8:19 AM   Result Value Ref Range    Vitamin D Deficiency screening 23 20 - 75 ug/L   Chlordiazepoxide serum blood level: Laboratory  Miscellaneous Order    Collection Time: 12/07/19  8:19 AM   Result Value Ref Range    Miscellaneous Test         Specimen Received, Reordered and sent to Performing laboratory - Report to follow upon   completion.     Comprehensive metabolic panel    Collection Time: 12/07/19  8:19 AM   Result Value Ref Range    Sodium 138 133 - 144 mmol/L    Potassium 4.3 3.4 - 5.3 mmol/L    Chloride 103 94 - 109 mmol/L    Carbon Dioxide 32 20 - 32 mmol/L    Anion Gap 3 3 - 14 mmol/L    Glucose 166 (H) 70 - 99 mg/dL    Urea Nitrogen 30 7 - 30 mg/dL    Creatinine 1.16 (H) 0.52 - 1.04 mg/dL    GFR Estimate 41 (L) >60 mL/min/[1.73_m2]    GFR Estimate If Black 47 (L) >60 mL/min/[1.73_m2]    Calcium 9.3 8.5 - 10.1 mg/dL    Bilirubin Total 0.5 0.2 - 1.3 mg/dL    Albumin 3.4 3.4 - 5.0 g/dL    Protein Total 6.6 (L) 6.8 - 8.8 g/dL    Alkaline Phosphatase 93 40 - 150 U/L    ALT 30 0 - 50 U/L    AST 25 0 - 45 U/L   Hemoglobin A1c    Collection Time: 12/07/19  8:19 AM   Result Value Ref Range    Hemoglobin A1C 7.5 (H) 0 - 5.6 %   Dale Miscellaneous Test    Collection Time: 12/07/19  8:19 AM   Result Value Ref Range    Result PENDING     Test Name CHLORDIAZEPOXIDE AND METABOLITE SERUM      Send Outs Misc Test Code CDP     Send Outs Misc Test Specimen Serum    Glucose by meter    Collection Time: 12/07/19  5:08 PM   Result Value Ref Range    Glucose 271 (H) 70 - 99 mg/dL   Glucose by meter    Collection Time: 12/07/19  8:57 PM   Result Value Ref Range    Glucose 309 (H) 70 - 99 mg/dL   Glucose by meter    Collection Time: 12/07/19 11:12 PM   Result Value Ref Range    Glucose 182 (H) 70 - 99 mg/dL   Glucose by meter    Collection Time: 12/08/19  7:23 AM   Result Value Ref Range    Glucose 190 (H) 70 - 99 mg/dL   Glucose by meter    Collection Time: 12/08/19 12:10 PM   Result Value Ref Range    Glucose 113 (H) 70 - 99 mg/dL   Glucose by meter    Collection Time: 12/08/19  5:01 PM   Result Value Ref Range    Glucose 191 (H) 70 - 99 mg/dL   Glucose  by meter    Collection Time: 12/09/19  7:22 AM   Result Value Ref Range    Glucose 136 (H) 70 - 99 mg/dL   Basic metabolic panel    Collection Time: 12/09/19  8:07 AM   Result Value Ref Range    Sodium 139 133 - 144 mmol/L    Potassium 3.6 3.4 - 5.3 mmol/L    Chloride 104 94 - 109 mmol/L    Carbon Dioxide 31 20 - 32 mmol/L    Anion Gap 4 3 - 14 mmol/L    Glucose 204 (H) 70 - 99 mg/dL    Urea Nitrogen 29 7 - 30 mg/dL    Creatinine 1.07 (H) 0.52 - 1.04 mg/dL    GFR Estimate 45 (L) >60 mL/min/[1.73_m2]    GFR Estimate If Black 52 (L) >60 mL/min/[1.73_m2]    Calcium 8.9 8.5 - 10.1 mg/dL   EKG 12-lead, complete    Collection Time: 12/09/19 11:59 AM   Result Value Ref Range    Interpretation ECG Click View Image link to view waveform and result    Glucose by meter    Collection Time: 12/09/19 12:04 PM   Result Value Ref Range    Glucose 171 (H) 70 - 99 mg/dL   Glucose by meter    Collection Time: 12/09/19  4:54 PM   Result Value Ref Range    Glucose 179 (H) 70 - 99 mg/dL   Glucose by meter    Collection Time: 12/10/19  8:00 AM   Result Value Ref Range    Glucose 111 (H) 70 - 99 mg/dL   Glucose by meter    Collection Time: 12/10/19 12:59 PM   Result Value Ref Range    Glucose 354 (H) 70 - 99 mg/dL   Glucose by meter    Collection Time: 12/10/19  5:15 PM   Result Value Ref Range    Glucose 196 (H) 70 - 99 mg/dL   Glucose by meter    Collection Time: 12/11/19  7:52 AM   Result Value Ref Range    Glucose 169 (H) 70 - 99 mg/dL   Glucose by meter    Collection Time: 12/11/19  5:25 PM   Result Value Ref Range    Glucose 184 (H) 70 - 99 mg/dL   Glucose by meter    Collection Time: 12/12/19  7:47 AM   Result Value Ref Range    Glucose 157 (H) 70 - 99 mg/dL   Glucose by meter    Collection Time: 12/12/19  4:44 PM   Result Value Ref Range    Glucose 212 (H) 70 - 99 mg/dL            Psychiatric Examination:   Temp: 98.2  F (36.8  C) Temp src: Oral BP: (!) 156/73 Pulse: 70   Resp: 16 SpO2: 96 % O2 Device: None (Room air)    Weight is  110 lbs 3.2 oz  Body mass index is 22.26 kg/m .    Appearance: awake, alert, cooperative, no apparent distress and thin  Attitude:  cooperative  Eye Contact:  good  Mood:  sad   Affect:  mood congruent  Speech:  clear, coherent  Psychomotor Behavior:  no evidence of tardive dyskinesia, dystonia, or tics  Throught Process:  illogical  Associations:  no loose associations  Thought Content:  no evidence of suicidal ideation or homicidal ideation, no auditory hallucinations present and no visual hallucinations present  Insight:  fair  Judgement:  fair  Oriented to:  self and date only  Attention Span and Concentration:  fair  Recent and Remote Memory:  fair         Precautions:     Behavioral Orders   Procedures     Code 1 - Restrict to Unit     Fall precautions     Routine Programming     As clinically indicated     Single Room     Status 15     Every 15 minutes.     Status Individual Observation     High risk for falls; Pt. is impulsive.     Order Specific Question:   CONTINUOUS 24 hours / day     Answer:   5 feet     Order Specific Question:   Indications for SIO     Answer:   Medical equipment / ligature risk     Withdrawal precautions          DIagnoses:   1.  Schizophrenia by history.   2.  Rule out cognitive impairment due to chronic dementia-type process.   3.  Extremely hard of hearing.   4.  Type 2 diabetes.   5.  Chronic kidney disease.   6.  Heart failure.   7.  Hypertension.          Plan:   --Discontinue Librium  --Start Phenobarbital taper   --Continue Prolixin 0.5 mg, bid.   --PRN Hydroxyzine, Zyprexa, Trazodone.   --MSSA for benzo withdrawal  --Blood work was reviewed  --IM to follow up for medical problems.      Disposition Plan   Reason for ongoing admission: is unable to care for self due to schizophrenia and likely dementia   Disposition: LTC  Estimated length of stay: 7-10 days  Legal Status:  voluntary  Discharge will be granted once symptoms improved.  Dr. Pro will assume care on Monday.      Celestino NAM, CNP  Date: 12/13/19  Time: 12:12 PM

## 2019-12-13 NOTE — PROGRESS NOTES
Sent referrals to following skilled nursing facilities:    [facility] [place]  [facility]

## 2019-12-13 NOTE — PLAN OF CARE
Problem: Adult Behavioral Health Plan of Care  Goal: Team Discussion  Description  Team Plan:  Outcome: Improving  Note:   BEHAVIORAL TEAM DISCUSSION    Participants: Celestino Mills DNP, APRN, Edwige Reid RN, Mariam Peters [facility], Nita Machado, OT  Progress: Improving  Anticipated length of stay:one week  Continued Stay Criteria/Rationale:Hallucinations  Medical/Physical: DMII, chronic kidney disease, heart failure HTN, history of complete heart block  Precautions:   Behavioral Orders   Procedures     Code 1 - Restrict to Unit     Fall precautions     Routine Programming     As clinically indicated     Single Room     Status 15     Every 15 minutes.     Status Individual Observation     High risk for falls; Pt. is impulsive.     Order Specific Question:   CONTINUOUS 24 hours / day     Answer:   5 feet     Order Specific Question:   Indications for SIO     Answer:   Medical equipment / ligature risk     Withdrawal precautions     Plan: Plan is to discharge pt to a skilled nursing facility  Rationale for change in precautions or plan: N/A

## 2019-12-13 NOTE — PLAN OF CARE
Discharge Planner PT   Patient plan for discharge: not discussed  Current status: Pt in chair, needs SBA to stand. Pt ambulates 150' w/ FWW. Pt CGA initially but progresses to SBA over bout. Pt reports fatigue therefore deferred further ambulation. Would benefit from ambulation bouts throughout the day w/ nursing staff on unit.   Barriers to return to prior living situation: cognition, stairs, family unable to provide level of care pt requires  Recommendations for discharge: LTC +  PT  Rationale for recommendations: pt appears to be nearing functional baseline, is SBA for mobility with FWW. Pt was receiving 24/7 assist prior to admission and per chart family no longer able to provided. Due to this recommend LTC. Would benefit from ongoing therapy at next level of care to progress functional tolerance and strength however has met inpatient PT goals for safe mobility while on unit, will complete orders.     Physical Therapy Discharge Summary    Reason for therapy discharge:    Discharged to long term care facility.    Progress towards therapy goal(s). See goals on [facility] Plan in [facility] health record for goal details.  Goals met    Therapy recommendation(s):    Continued therapy is recommended.  Rationale/Recommendations:  See above.           Entered by: Anu Varela 12/13/2019 5:05 PM

## 2019-12-13 NOTE — PROGRESS NOTES
Spoke with pt's brother-in-law, Tomasz, who reports that he will email write a list of nursing facilities that family would like writer to send skilled nursing referrals to.  Waiting for list. Will make referrals when writer receives list.

## 2019-12-14 LAB — GLUCOSE BLDC GLUCOMTR-MCNC: 217 MG/DL (ref 70–99)

## 2019-12-14 PROCEDURE — 00000146 ZZHCL STATISTIC GLUCOSE BY METER IP

## 2019-12-14 PROCEDURE — 25000132 ZZH RX MED GY IP 250 OP 250 PS 637: Mod: GY | Performed by: PSYCHIATRY & NEUROLOGY

## 2019-12-14 PROCEDURE — 12400002 ZZH R&B MH SENIOR/ADOLESCENT

## 2019-12-14 PROCEDURE — 25000132 ZZH RX MED GY IP 250 OP 250 PS 637: Mod: GY | Performed by: PHYSICIAN ASSISTANT

## 2019-12-14 PROCEDURE — 25000132 ZZH RX MED GY IP 250 OP 250 PS 637: Mod: GY | Performed by: NURSE PRACTITIONER

## 2019-12-14 RX ORDER — GLIPIZIDE 5 MG/1
5 TABLET ORAL
Status: DISCONTINUED | OUTPATIENT
Start: 2019-12-15 | End: 2019-12-17

## 2019-12-14 RX ADMIN — GLYCERIN, PETROLATUM, PHENYLEPHRINE HCL, PRAMOXINE HCL: 144; 2.5; 10; 15 CREAM TOPICAL at 21:16

## 2019-12-14 RX ADMIN — WITCH HAZEL: 500 SOLUTION RECTAL; TOPICAL at 14:00

## 2019-12-14 RX ADMIN — ATORVASTATIN CALCIUM 20 MG: 20 TABLET, FILM COATED ORAL at 21:12

## 2019-12-14 RX ADMIN — Medication 0.5 MG: at 21:15

## 2019-12-14 RX ADMIN — FUROSEMIDE 40 MG: 40 TABLET ORAL at 08:12

## 2019-12-14 RX ADMIN — DOCUSATE SODIUM 100 MG: 100 CAPSULE, LIQUID FILLED ORAL at 21:12

## 2019-12-14 RX ADMIN — Medication 2.5 MG: at 08:12

## 2019-12-14 RX ADMIN — Medication 8.1 MG: at 21:12

## 2019-12-14 RX ADMIN — Medication 8.1 MG: at 14:21

## 2019-12-14 RX ADMIN — Medication 8.1 MG: at 08:12

## 2019-12-14 RX ADMIN — DOCUSATE SODIUM 100 MG: 100 CAPSULE, LIQUID FILLED ORAL at 08:12

## 2019-12-14 RX ADMIN — Medication 0.5 MG: at 08:12

## 2019-12-14 ASSESSMENT — ACTIVITIES OF DAILY LIVING (ADL)
ORAL_HYGIENE: INDEPENDENT
HYGIENE/GROOMING: INDEPENDENT
LAUNDRY: UNABLE TO COMPLETE
DRESS: SCRUBS (BEHAVIORAL HEALTH);INDEPENDENT
ORAL_HYGIENE: INDEPENDENT
DRESS: SCRUBS (BEHAVIORAL HEALTH)
HYGIENE/GROOMING: INDEPENDENT

## 2019-12-14 ASSESSMENT — MIFFLIN-ST. JEOR: SCORE: 806.88

## 2019-12-14 NOTE — PROGRESS NOTES
Pt.had a good shift. Pt.reported that she was tired during the shift. During physical therapy pt.was able to walk from her room to the lounge and back to her room. Pt.had ate most of her dinner during the shift. Pt.was pleasant and corporative.      12/13/19 2200   Behavioral Health   Hallucinations denies / not responding to hallucinations   Thinking distractable   Orientation person: oriented;date: oriented;place: oriented;time: oriented   Memory baseline memory   Insight insight appropriate to situation   Judgement impaired   Eye Contact at examiner   Affect full range affect   Mood mood is calm   Physical Appearance/Attire attire appropriate to age and situation   Hygiene well groomed   Suicidality other (see comments)  (none stated)   1. Wish to be Dead (Recent) No   2. Non-Specific Active Suicidal Thoughts (Recent) No   Self Injury other (see comment)  (none stated )   Elopement   (none stated or observed )   Activity other (see comment)  (visible in the milieu )   Speech pressured   Medication Sensitivity no stated side effects   Psychomotor / Gait unsteady   Activities of Daily Living   Hygiene/Grooming independent   Oral Hygiene independent   Dress independent   Room Organization independent

## 2019-12-14 NOTE — PROGRESS NOTES
"Brief Medicine Note    Wt 108 lb (110lb) following additional lasix dose 12/12. BP in acceptable range given age (157/80). Please continue to monitor BP and volume status with daily weights.    Recent BG readings as follows:  Recent Labs   Lab 12/14/19  0754 12/13/19  1704 12/13/19  0728 12/12/19  1644 12/12/19  0747 12/11/19  1725  12/09/19  0807   GLC  --   --   --   --   --   --   --  204*   * 199* 203* 212* 157* 184*   < >  --     < > = values in this interval not displayed.     Metformin held on admission given CrCl. Started on glipizide 2.5 mg. Will increase this dose to 5 mg daily and continue to monitor.    Today's vital signs, medications, and nursing notes were reviewed.     BP (!) 157/80   Pulse 63   Temp 97.8  F (36.6  C) (Tympanic)   Resp 16   Ht 1.499 m (4' 11\")   Wt 49.1 kg (108 lb 4.8 oz)   SpO2 98%   BMI 21.87 kg/m        Narda Lay PA-C  Hospitalist Service  Pager [phone]      "

## 2019-12-14 NOTE — PLAN OF CARE
"48 HOUR NURSING ASSESSMENT:  Pt has been pleasant and cooperative during interactions. Pt is bright and interactive with staff. Pt attended group this AM and did well with CART interpretor. Pt does well with reading from the computer or note pad. Pt describes her mood as \"happy\".  Pt denies SI/SIB.  Pt has been able to make her needs known. Pt has had two small BM's this shift. Pt is verbalizing constipation. Pt was offered/accepted a prune juice.  Pt noted to have hemorrhoids and patient reports these being painful. Order received for prn TUCKs wipes as well as TID Prep H.   Pt noted to have a elevated blood pressure. Pt reports that the blue cuff on her lower leg is very painful and pressure readings were very elevated (systolic over 200 and diastolic over 100). Blood pressure was rechecked using the red cuff and patient reported that this was less painful and readings were improved (systolic in 150's and diastolic in the 60-80's. Consulted medical/Kayla B and request was made to be consistent with which cuff and where pressures are taken. Pt care order placed to take blood pressure with red cuff on LLL.   Medical also consulted and request was made to review patient's blood glucose readings as they have been elevated. Order received to increase glipizide from 2.5 to 5 mg tomorrow morning.   Pt has been ambulating with walker, gait belt and SBA from 1 staff.   Pt has enjoyed doing water coloring this shift.     Pt has been  % of meals.   "

## 2019-12-15 LAB
GLUCOSE BLDC GLUCOMTR-MCNC: 221 MG/DL (ref 70–99)
GLUCOSE BLDC GLUCOMTR-MCNC: 280 MG/DL (ref 70–99)

## 2019-12-15 PROCEDURE — 25000132 ZZH RX MED GY IP 250 OP 250 PS 637: Mod: GY | Performed by: PHYSICIAN ASSISTANT

## 2019-12-15 PROCEDURE — 25000132 ZZH RX MED GY IP 250 OP 250 PS 637: Mod: GY | Performed by: NURSE PRACTITIONER

## 2019-12-15 PROCEDURE — 25000132 ZZH RX MED GY IP 250 OP 250 PS 637: Mod: GY | Performed by: PSYCHIATRY & NEUROLOGY

## 2019-12-15 PROCEDURE — H2032 ACTIVITY THERAPY, PER 15 MIN: HCPCS

## 2019-12-15 PROCEDURE — 12400002 ZZH R&B MH SENIOR/ADOLESCENT

## 2019-12-15 PROCEDURE — 00000146 ZZHCL STATISTIC GLUCOSE BY METER IP

## 2019-12-15 RX ORDER — FUROSEMIDE 40 MG
40 TABLET ORAL
Status: DISCONTINUED | OUTPATIENT
Start: 2019-12-15 | End: 2019-12-19

## 2019-12-15 RX ADMIN — Medication 8.1 MG: at 20:41

## 2019-12-15 RX ADMIN — WITCH HAZEL: 500 SOLUTION RECTAL; TOPICAL at 09:32

## 2019-12-15 RX ADMIN — ATORVASTATIN CALCIUM 20 MG: 20 TABLET, FILM COATED ORAL at 20:41

## 2019-12-15 RX ADMIN — FUROSEMIDE 40 MG: 40 TABLET ORAL at 08:27

## 2019-12-15 RX ADMIN — GLYCERIN, PETROLATUM, PHENYLEPHRINE HCL, PRAMOXINE HCL: 144; 2.5; 10; 15 CREAM TOPICAL at 20:42

## 2019-12-15 RX ADMIN — DOCUSATE SODIUM 100 MG: 100 CAPSULE, LIQUID FILLED ORAL at 20:41

## 2019-12-15 RX ADMIN — DOCUSATE SODIUM 100 MG: 100 CAPSULE, LIQUID FILLED ORAL at 08:26

## 2019-12-15 RX ADMIN — Medication 8.1 MG: at 08:26

## 2019-12-15 RX ADMIN — Medication 8.1 MG: at 14:28

## 2019-12-15 RX ADMIN — TRAZODONE HYDROCHLORIDE 50 MG: 50 TABLET ORAL at 00:17

## 2019-12-15 RX ADMIN — GLIPIZIDE 5 MG: 5 TABLET ORAL at 08:27

## 2019-12-15 RX ADMIN — GLYCERIN, PETROLATUM, PHENYLEPHRINE HCL, PRAMOXINE HCL: 144; 2.5; 10; 15 CREAM TOPICAL at 09:32

## 2019-12-15 RX ADMIN — Medication 0.5 MG: at 20:42

## 2019-12-15 RX ADMIN — Medication 0.5 MG: at 08:26

## 2019-12-15 RX ADMIN — FUROSEMIDE 40 MG: 40 TABLET ORAL at 14:28

## 2019-12-15 ASSESSMENT — ACTIVITIES OF DAILY LIVING (ADL)
HYGIENE/GROOMING: INDEPENDENT
ORAL_HYGIENE: INDEPENDENT
ORAL_HYGIENE: INDEPENDENT
DRESS: INDEPENDENT
HYGIENE/GROOMING: INDEPENDENT;WITH ASSISTANCE

## 2019-12-15 ASSESSMENT — MIFFLIN-ST. JEOR: SCORE: 822.3

## 2019-12-15 NOTE — PROGRESS NOTES
"Brief Medicine Note    Contacted by nursing regarding 3lb weight gain since yesterday (111lb >108 lb) along with elevated BP. Pt with 2+ BLE edema on exam. Will increase Lasix to 40 mg BID for now and continue to monitor with daily weight. Will repeat BMP tomorrow morning to ensure stability of kidney function.     IM also follow for DM. Glipizide increased to 5 mg daily this morning. Recent BG as follows:  Recent Labs   Lab 12/15/19  0752 12/14/19  0754 12/13/19  1704 12/13/19  0728 12/12/19  1644 12/12/19  0747  12/09/19  0807   GLC  --   --   --   --   --   --   --  204*   * 217* 199* 203* 212* 157*   < >  --     < > = values in this interval not displayed.   - Continue glipizide at current dose and continue to monitor      Today's vital signs, medications, and nursing notes were reviewed.     BP (!) 174/96   Pulse 71   Temp 97.8  F (36.6  C)   Resp 16   Ht 1.499 m (4' 11\")   Wt 50.7 kg (111 lb 11.2 oz)   SpO2 97%   BMI 22.56 kg/m        Narda Lay PA-C  Hospitalist Service  Pager [phone]      "

## 2019-12-15 NOTE — PLAN OF CARE
"48 HOUR NURSING ASSESSMENT:  Pt has been pleasant and cooperative. Pt brightens during interactions and is engaged with staff and peers. Pt describes her mood as \"good\" this AM. Pt denies SI/SIB. Pt has been able to make her needs known. Pt has been eating well at meals and has been drinking fluids.  Pt has been continent of urine. Pt continues to report issues with constipation. Pt was offered/accepted 240 ml of prune juice this AM.   Pt has been steadier on her feet. Pt continues to use a walker and remains on SIO due to patient being a fall risk as well as vulnerable due to poor hearing and medical bed/pulsate mattress usage.     Pt has been medication compliant. Pt has used prn trazodone 50 mg x 1 in the last 48 hours.     Pt has documented sleep between 4.75 and 6 hours a night.    Vital signs and weight gain reviewed with Kayla CARTER. Writer was unable to obtain blood pressure on left lower leg due to edema and being very painful for patient this shift.  Blood pressure was obtained on RLL using red cuff. 174/96 after breakfast  and 148/54 at 1130.     Pt has been visible in common areas for most of the shift. Pt has been ambulating to and from lounge as well as to group. Pt has been painting with water colors. Pt has been using foot stool to elevated her legs as they continue to be edematous. Pt was started on BID dosing of lasix. Kayla CARTER updated on 1400 elevated blood pressure. Will continue to monitor. Pt will continue to be followed by medical team. BMP ordered for tomorrow AM.   "

## 2019-12-15 NOTE — PROGRESS NOTES
"   12/14/19 2000   Behavioral Health   Hallucinations denies / not responding to hallucinations   Thinking distractable   Orientation person: oriented;place: oriented;date: oriented;time: oriented   Memory baseline memory   Insight insight appropriate to situation   Judgement impaired   Eye Contact at examiner   Affect full range affect   Mood mood is calm   Physical Appearance/Attire attire appropriate to age and situation   Hygiene well groomed   Suicidality other (see comments)  (none stated or observed)   1. Wish to be Dead (Recent) No   2. Non-Specific Active Suicidal Thoughts (Recent) No   Self Injury other (see comment)  (none stated or observed)   Elopement   (none observed)   Activity other (see comment)  (visible in milieu)   Speech clear;coherent   Medication Sensitivity no stated side effects;no observed side effects   Psychomotor / Gait unsteady   Activities of Daily Living   Hygiene/Grooming independent   Oral Hygiene independent   Dress scrubs (behavioral health)   Laundry unable to complete   Room Organization independent       Pt had a good shift. Pt was pleasant and cooperative and had a bright affect with interaction. Pt had an  come in and utilized the ipad for communication. Pt stated feeling \"fine\" and denies any anxiety and depression. Pt stated \"we need to be out in the community and not watch T.V. all day\" Pt attended community meeting and ate dinner in the lounge. No notable events this shift.                "

## 2019-12-16 LAB
ANION GAP SERPL CALCULATED.3IONS-SCNC: 2 MMOL/L (ref 3–14)
BUN SERPL-MCNC: 37 MG/DL (ref 7–30)
CALCIUM SERPL-MCNC: 8.7 MG/DL (ref 8.5–10.1)
CHLORIDE SERPL-SCNC: 106 MMOL/L (ref 94–109)
CO2 SERPL-SCNC: 30 MMOL/L (ref 20–32)
CREAT SERPL-MCNC: 1.07 MG/DL (ref 0.52–1.04)
GFR SERPL CREATININE-BSD FRML MDRD: 45 ML/MIN/{1.73_M2}
GLUCOSE BLDC GLUCOMTR-MCNC: 206 MG/DL (ref 70–99)
GLUCOSE BLDC GLUCOMTR-MCNC: 268 MG/DL (ref 70–99)
GLUCOSE SERPL-MCNC: 220 MG/DL (ref 70–99)
POTASSIUM SERPL-SCNC: 4.3 MMOL/L (ref 3.4–5.3)
SODIUM SERPL-SCNC: 138 MMOL/L (ref 133–144)

## 2019-12-16 PROCEDURE — 00000146 ZZHCL STATISTIC GLUCOSE BY METER IP

## 2019-12-16 PROCEDURE — G0177 OPPS/PHP; TRAIN & EDUC SERV: HCPCS

## 2019-12-16 PROCEDURE — 25000132 ZZH RX MED GY IP 250 OP 250 PS 637: Mod: GY | Performed by: NURSE PRACTITIONER

## 2019-12-16 PROCEDURE — 25000132 ZZH RX MED GY IP 250 OP 250 PS 637: Mod: GY | Performed by: PHYSICIAN ASSISTANT

## 2019-12-16 PROCEDURE — 80048 BASIC METABOLIC PNL TOTAL CA: CPT | Performed by: PHYSICIAN ASSISTANT

## 2019-12-16 PROCEDURE — 90853 GROUP PSYCHOTHERAPY: CPT

## 2019-12-16 PROCEDURE — 36415 COLL VENOUS BLD VENIPUNCTURE: CPT | Performed by: PHYSICIAN ASSISTANT

## 2019-12-16 PROCEDURE — 99232 SBSQ HOSP IP/OBS MODERATE 35: CPT | Performed by: PSYCHIATRY & NEUROLOGY

## 2019-12-16 PROCEDURE — 25000132 ZZH RX MED GY IP 250 OP 250 PS 637: Mod: GY | Performed by: PSYCHIATRY & NEUROLOGY

## 2019-12-16 PROCEDURE — H2032 ACTIVITY THERAPY, PER 15 MIN: HCPCS

## 2019-12-16 PROCEDURE — 12400002 ZZH R&B MH SENIOR/ADOLESCENT

## 2019-12-16 RX ADMIN — ATORVASTATIN CALCIUM 20 MG: 20 TABLET, FILM COATED ORAL at 20:12

## 2019-12-16 RX ADMIN — Medication 8.1 MG: at 20:12

## 2019-12-16 RX ADMIN — TRAZODONE HYDROCHLORIDE 50 MG: 50 TABLET ORAL at 01:10

## 2019-12-16 RX ADMIN — DOCUSATE SODIUM 100 MG: 100 CAPSULE, LIQUID FILLED ORAL at 20:13

## 2019-12-16 RX ADMIN — FUROSEMIDE 40 MG: 40 TABLET ORAL at 08:17

## 2019-12-16 RX ADMIN — Medication 0.5 MG: at 20:12

## 2019-12-16 RX ADMIN — FUROSEMIDE 40 MG: 40 TABLET ORAL at 15:20

## 2019-12-16 RX ADMIN — DOCUSATE SODIUM 100 MG: 100 CAPSULE, LIQUID FILLED ORAL at 08:17

## 2019-12-16 RX ADMIN — GLYCERIN, PETROLATUM, PHENYLEPHRINE HCL, PRAMOXINE HCL: 144; 2.5; 10; 15 CREAM TOPICAL at 09:04

## 2019-12-16 RX ADMIN — Medication 8.1 MG: at 08:17

## 2019-12-16 RX ADMIN — GLIPIZIDE 5 MG: 5 TABLET ORAL at 08:17

## 2019-12-16 RX ADMIN — Medication 0.5 MG: at 08:18

## 2019-12-16 RX ADMIN — GLYCERIN, PETROLATUM, PHENYLEPHRINE HCL, PRAMOXINE HCL: 144; 2.5; 10; 15 CREAM TOPICAL at 20:46

## 2019-12-16 RX ADMIN — GLYCERIN, PETROLATUM, PHENYLEPHRINE HCL, PRAMOXINE HCL: 144; 2.5; 10; 15 CREAM TOPICAL at 13:06

## 2019-12-16 ASSESSMENT — ACTIVITIES OF DAILY LIVING (ADL)
ORAL_HYGIENE: INDEPENDENT
DRESS: SCRUBS (BEHAVIORAL HEALTH)
LAUNDRY: UNABLE TO COMPLETE
HYGIENE/GROOMING: INDEPENDENT;WITH SUPERVISION
DRESS: SCRUBS (BEHAVIORAL HEALTH)
ORAL_HYGIENE: INDEPENDENT
HYGIENE/GROOMING: INDEPENDENT;WITH SUPERVISION

## 2019-12-16 ASSESSMENT — MIFFLIN-ST. JEOR: SCORE: 820.48

## 2019-12-16 NOTE — PROGRESS NOTES
[facility], [place]   Psychiatric Progress Note        Interim History:   The patient's care was discussed with the treatment team during the daily team meeting and/or staff's chart notes were reviewed.  Staff report patient eating well. Attending groups and social with peers. Reported that depression is better. No anxiety, SI or ASHER reported. No overt psychosis, sugar or confusion. Independent with ADL. Sleeping and eating well.     Utilized CART service during this encounter.  The patient was pleasant and engaged.  The patient noted that she was restless last night, she believes that she missed hs medications. She feels tired today and plans to take a short nap. She was upset with her daughter prior to admission but visit with daughter this weekend went well. Depression and anxiety improved. No SI or ASHER. Tolerating medications well. No hallucinations or racing thoughts.     Discussed medications and care plan.        Medications:       atorvastatin  20 mg Oral QPM     docusate sodium  100 mg Oral BID     fluPHENAZine  0.5 mg Oral BID     furosemide  40 mg Oral BID     glipiZIDE  5 mg Oral QAM AC     insulin aspart  3 Units Subcutaneous Once     PHENobarbital  8.1 mg Oral TID     pramox-pe-glycerin-petrolatum   Rectal TID          Allergies:     Allergies   Allergen Reactions     Strawberry Hives     Sulfa Drugs Nausea     mouth sores       Zomig [Zolmitriptan] Other (See Comments)     depression            Labs:     Recent Results (from the past 24 hour(s))   Glucose by meter    Collection Time: 12/15/19  4:45 PM   Result Value Ref Range    Glucose 280 (H) 70 - 99 mg/dL   Basic metabolic panel    Collection Time: 12/16/19  6:53 AM   Result Value Ref Range    Sodium 138 133 - 144 mmol/L    Potassium 4.3 3.4 - 5.3 mmol/L    Chloride 106 94 - 109 mmol/L    Carbon Dioxide 30 20 - 32 mmol/L    Anion Gap 2 (L) 3 - 14 mmol/L    Glucose 220 (H) 70 - 99 mg/dL    Urea Nitrogen 37 (H) 7 - 30  mg/dL    Creatinine 1.07 (H) 0.52 - 1.04 mg/dL    GFR Estimate 45 (L) >60 mL/min/[1.73_m2]    GFR Estimate If Black 52 (L) >60 mL/min/[1.73_m2]    Calcium 8.7 8.5 - 10.1 mg/dL   Glucose by meter    Collection Time: 12/16/19  7:58 AM   Result Value Ref Range    Glucose 206 (H) 70 - 99 mg/dL          Psychiatric Examination:     Vitals:    12/15/19 1130 12/15/19 1426 12/16/19 0124 12/16/19 0800   BP: (!) 148/54 (!) 180/76  (!) 206/104   Pulse: 70 73 66 92   Resp: 16 16 16   Temp:   98.4  F (36.9  C) 98.2  F (36.8  C)   TempSrc:   Tympanic Oral   SpO2:    96%   Weight:    50.5 kg (111 lb 4.8 oz)   Height:                         Sitting Orthostatic BP: 206/104      Sitting Orthostatic Pulse: 92 bpm                     Weight is 111 lbs 4.8 oz  Body mass index is 22.48 kg/m .    Appearance: awake, alert, adequately groomed, appeared as age stated and no apparent distress   Attitude:  cooperative  Eye Contact:  good  Mood:  anxious, depressed and better  Affect:  full range and reactive  Speech:  clear, coherent and normal prosody  Psychomotor Behavior:  no evidence of tardive dyskinesia, dystonia, or tics and tremor observed   Throught Process:  linear and goal oriented  Associations:  no loose associations  Thought Content:  no evidence of suicidal ideation or homicidal ideation and no evidence of psychotic thought  Insight:  fair  Judgement:  intact  Oriented to:  time, person, and place  Attention Span and Concentration:  intact  Recent and Remote Memory:  intact         Precautions:     Behavioral Orders   Procedures     Code 1 - Restrict to Unit     Fall precautions     Routine Programming     As clinically indicated     Single Room     Status 15     Every 15 minutes.     Status Individual Observation     High risk for falls; Pt. is impulsive.     Order Specific Question:   CONTINUOUS 24 hours / day     Answer:   5 feet     Order Specific Question:   Indications for SIO     Answer:   Medical equipment / ligature  risk     Withdrawal precautions          Diagnoses:     1.  Schizophrenia by history.   2.  Rule out cognitive impairment due to chronic dementia-type process.   3.  Hard of hearing.          Plan:     Medications:  -- Librium was discontinued and placed on Benzodiazepine withdrawal protocol with Phenobarb taper. Phenobarb dose lowered to 8.1 mg BID x5 doses then discontinue.   -- Prolixin 0.5 mg, bid continued.   --PRN Hydroxyzine, Zyprexa, Trazodone.     Legal Status and Disposition:  -- volunt.   -- discharge to  once mood stabilization and safety in the community established.

## 2019-12-16 NOTE — PROGRESS NOTES
12/15/19 1866   General Information   Art Directive other (see comments)   AT directive is to create an image of a safe place and to identify items within the place that make it a safe place for pt. Goals of directive: trauma containment, emotional expression. Pt was a positive participant, focused on task for the full duration of group. Pt created an image of a waterfall and briefly shared with group. Pt said that the peace and quiet of the setting creates a safe place for pt.  Pts mood was calm.

## 2019-12-16 NOTE — PROGRESS NOTES
Sent referrals to following skilled nursing facilities:    [facility] - [place]  [facility] - [facility]

## 2019-12-16 NOTE — PROGRESS NOTES
"Participated in Music Therapy group with focus on mood elevation, validation and decreasing anxiety and improved group cohesiveness. Engaged and cooperative in music listening interventions.   Showed progress in session goals.     Participated through deaf electronic interpretation, as well as feeling the beat (Music Therapist positioned the speaker next to her and she kept saying \"I can feel the beat!\"      Stated that Narendra is her favorite composer, Moonlight Sonata specifically, and that she hears the music in her mind still.      She also sang during group, though it was not in sync with the music being played, but she appeared to be benefiting from that engagement.   "

## 2019-12-16 NOTE — PROGRESS NOTES
"Pt.had a good evening. Pt. Had two large bowel movement during the shift. Pt.was able to do some walking from lounge to her room. Pt.daughter and  came to visit, the visit occur in the milieu. Pt.said that the visit went well but do not want to go back home to her daughter. Pt.said that she is afraid that daughter will come and take her out, pt.reported that she is not ready to leave. Pt. Also stated that her daughter is \"controlling and feel that daughter does not understand what she is going through\". Pt.requested that all her visit occur in the lounge instead of her room. Overall pt.reported she had a great evening. Pt.said she grateful to the staff for all their help.   "

## 2019-12-16 NOTE — PROGRESS NOTES
Pt only slept 3 hours tonight despite taking Trazodone 50 mg at 0110.  MSSA 2  Went to the toilet x 3 tonight  Loved to talk with SIO staff, encouraged to sleep at night.

## 2019-12-17 LAB
GLUCOSE BLDC GLUCOMTR-MCNC: 189 MG/DL (ref 70–99)
GLUCOSE BLDC GLUCOMTR-MCNC: 242 MG/DL (ref 70–99)
RESULT: NORMAL
SEND OUTS MISC TEST CODE: NORMAL
SEND OUTS MISC TEST SPECIMEN: NORMAL
TEST NAME: NORMAL

## 2019-12-17 PROCEDURE — 25000132 ZZH RX MED GY IP 250 OP 250 PS 637: Mod: GY | Performed by: PSYCHIATRY & NEUROLOGY

## 2019-12-17 PROCEDURE — 25000132 ZZH RX MED GY IP 250 OP 250 PS 637: Mod: GY | Performed by: PHYSICIAN ASSISTANT

## 2019-12-17 PROCEDURE — 00000146 ZZHCL STATISTIC GLUCOSE BY METER IP

## 2019-12-17 PROCEDURE — H2032 ACTIVITY THERAPY, PER 15 MIN: HCPCS

## 2019-12-17 PROCEDURE — 99232 SBSQ HOSP IP/OBS MODERATE 35: CPT | Performed by: PSYCHIATRY & NEUROLOGY

## 2019-12-17 PROCEDURE — 25000132 ZZH RX MED GY IP 250 OP 250 PS 637: Mod: GY | Performed by: NURSE PRACTITIONER

## 2019-12-17 PROCEDURE — 12400002 ZZH R&B MH SENIOR/ADOLESCENT

## 2019-12-17 PROCEDURE — G0177 OPPS/PHP; TRAIN & EDUC SERV: HCPCS

## 2019-12-17 RX ADMIN — Medication 0.5 MG: at 08:38

## 2019-12-17 RX ADMIN — ATORVASTATIN CALCIUM 20 MG: 20 TABLET, FILM COATED ORAL at 20:08

## 2019-12-17 RX ADMIN — DOCUSATE SODIUM 100 MG: 100 CAPSULE, LIQUID FILLED ORAL at 20:08

## 2019-12-17 RX ADMIN — GLIPIZIDE 5 MG: 5 TABLET ORAL at 08:39

## 2019-12-17 RX ADMIN — GLYCERIN, PETROLATUM, PHENYLEPHRINE HCL, PRAMOXINE HCL: 144; 2.5; 10; 15 CREAM TOPICAL at 14:04

## 2019-12-17 RX ADMIN — Medication 8.1 MG: at 20:08

## 2019-12-17 RX ADMIN — Medication 8.1 MG: at 08:39

## 2019-12-17 RX ADMIN — WITCH HAZEL: 500 SOLUTION RECTAL; TOPICAL at 10:38

## 2019-12-17 RX ADMIN — FUROSEMIDE 40 MG: 40 TABLET ORAL at 08:39

## 2019-12-17 RX ADMIN — GLYCERIN, PETROLATUM, PHENYLEPHRINE HCL, PRAMOXINE HCL: 144; 2.5; 10; 15 CREAM TOPICAL at 20:09

## 2019-12-17 RX ADMIN — FUROSEMIDE 40 MG: 40 TABLET ORAL at 14:03

## 2019-12-17 RX ADMIN — Medication 0.5 MG: at 20:08

## 2019-12-17 RX ADMIN — GLYCERIN, PETROLATUM, PHENYLEPHRINE HCL, PRAMOXINE HCL: 144; 2.5; 10; 15 CREAM TOPICAL at 10:38

## 2019-12-17 RX ADMIN — DOCUSATE SODIUM 100 MG: 100 CAPSULE, LIQUID FILLED ORAL at 08:38

## 2019-12-17 ASSESSMENT — ACTIVITIES OF DAILY LIVING (ADL)
ORAL_HYGIENE: INDEPENDENT;WITH SUPERVISION
DRESS: INDEPENDENT;SCRUBS (BEHAVIORAL HEALTH)
DRESS: WITH ASSISTANCE
HYGIENE/GROOMING: INDEPENDENT;WITH SUPERVISION
ORAL_HYGIENE: INDEPENDENT
HYGIENE/GROOMING: WITH SUPERVISION
LAUNDRY: UNABLE TO COMPLETE
ORAL_HYGIENE: INDEPENDENT
HYGIENE/GROOMING: INDEPENDENT

## 2019-12-17 ASSESSMENT — MIFFLIN-ST. JEOR: SCORE: 820.03

## 2019-12-17 NOTE — PROGRESS NOTES
"   12/16/19 1900   Group Therapy Session   Group Attendance attended group session   Total Time (minutes) 50   Group Type psychotherapeutic   Group Topic Covered other (see comments)   Patient Participation/Contribution cooperative with task;discussed personal experience with topic   Patient participated in psychotherapy group which included a mindfulness activity focusing on the 5 senses and then processing as a group.  Michaela reported feeling \"good.\" She presented as pleasant and engaged. She participated in the activity and shared her responses. Group members shared their written responses with her since she was unable to hear the discussion. [facility] also helped by writing the what was being discussed so that she could participate more fully.   "

## 2019-12-17 NOTE — PROGRESS NOTES
"Pt participated in organizing and expressive dance/movement therapy (DMT.)  She was positively engaged and expressed \"feeling good\" during the session.  She had strong nonverbal communications and appeared to rely on [facility] staff for physical stability and orientation to the group.         12/17/19 1130   Dance Movement Therapy   Type of Intervention structured groups   Response participates with encouragement   Hours 0.5     "

## 2019-12-17 NOTE — PROGRESS NOTES
Attended 1 OT group focused on emotions identification for increased self awareness and improved communication with supports/providers. Needed consistent cuing to use Ipad to see what was being spoken during groups. With prompting was able to identify emotions experienced. Recognized her need to communicate with supports about emotions to get her needs met more efficiently and effectively. Social with peers and noted the need to do so upon discharge.

## 2019-12-17 NOTE — PROGRESS NOTES
CLINICAL NUTRITION SERVICES - REASSESSMENT NOTE     Nutrition Prescription    RECOMMENDATIONS FOR MDs/PROVIDERS TO ORDER:  None at this time     Malnutrition Status:    Patient does not meet two of the established criteria necessary for diagnosing malnutrition    Recommendations already ordered by Registered Dietitian (RD):  None at this time     Future/Additional Recommendations:  Monitor PO intake and wt loss     EVALUATION OF THE PROGRESS TOWARD GOALS   Diet: Regular  Preferences: chocolate pudding with whipped cream, chocolate milk shakes, fruit with meals (any kind), cottage cheese with peaches, green beans, mashed potatoes, chicken/meats, hamburger (with pickles and mustard), grilled cheese sandwiches, coke, butter.     Dislikes: sauerkraut, hotdogs, Mexican food, tortillas.  Boost Plus (chocolate) w/ breakfast     Intake: Met w/ patient w/ assistance of . Pt reports that she has been eating 50% of 3 meals per day. Per meal ordering system, 50% of average meal orders over the past 5 days provide 1435 kcal and 57 g PRO daily. Meeting 100% of assessed energy and protein needs.      NEW FINDINGS   Weight: 50.4 kg on 12/17, up from admission wt of 48.4 kg on 12/8.   PTA- Patient has lost 30 lbs (23%) over the last 2 months.    Labs:   Cr 1.07 (H)  GFR 45 (L)  BG 12/16-12/17: 189-280    Meds:   Colce  Lasix  Novolog    MALNUTRITION  % Intake: Decreased intake does not meet criteria  % Weight Loss: > 7.5% in 3 months (severe)  Subcutaneous Fat Loss: None observed  Muscle Loss: None observed (sarcopenia)   Fluid Accumulation/Edema: None noted  Malnutrition Diagnosis: Patient does not meet two of the established criteria necessary for diagnosing malnutrition    Previous Goals   Patient to consume % of nutritionally adequate meal trays TID, or the equivalent with supplements/snacks.  Evaluation: Met    Previous Nutrition Diagnosis  Predicted inadequate protein-energy intake related to variable appetite  as evidenced by pt reliant on PO intakes to meet 100% of nutritional needs with potential for variation     Evaluation: No change    CURRENT NUTRITION DIAGNOSIS  Predicted inadequate nutrient intake (calories/protein) related to pt's current PO currently meeting 100% of nutrition needs as evidenced by potential for decline with previous wt loss    INTERVENTIONS  Implementation  Nutrition education for nutrition relationship to health/disease: Discussed current PO and appetite. Encouraged pt to continue to eat meals TID.     Goals  Patient to consume % of nutritionally adequate meal trays TID, or the equivalent with supplements/snacks.  Weight stability vs gain (>50 kg).     Monitoring/Evaluation  Progress toward goals will be monitored and evaluated per protocol.    Inocencia Souza RD, LD  Unit pager: [phone]

## 2019-12-17 NOTE — PROGRESS NOTES
BP in the legs are high. The one checked in the forearm is low. Ho BL mastectomies years ago. No arm edema reported by nursing.     Okay to check BP in upper arm (not forearm) for correlation  With legs. It should be okay to  Check BP in upper arm once victor manuel while to know whats the true BP so one can treat accordingly. If BP is soft, then one can back off on lasix.     Addendum: Leg BP is 133/61  Upper Arm BP is: 107/56.   So the leg BP is higher that the arm as expected.   Ct lasix at current for another day or so, but likely needs to be cut back based on the pressure.

## 2019-12-17 NOTE — PLAN OF CARE
48 HOUR NURSING ASSESSMENT:  Pt has been pleasant and cooperative. Pt is bright and interactive with peers and staff. Pt denies SI/SIB/hallucinations. Pt has been attending programming and has been able to make her needs known. Pt has been eating 100% of meals.  Pt has been ambulating in the diaz with SBA, gait belt and her walker.   Pt has been medication compliant. Pt has  used  prn trazodone 50 mg x 2 in the last 48 hours.  Pt has documented sleep between 4.75 and 5 hours a night.

## 2019-12-17 NOTE — PROGRESS NOTES
Pt denied having pain though complained of feeling like the blood pressure cuff was still on her left arm. Pt's scrub top (size small)  was tight on her left arm pit. Writer  changed patient's scrub top to a medium sweatshirt top for comfort and pt is on the toilet voiding at this time.     Pt's lasix had been increased to 40mg, and she has voided small amounts multiple times this shift= x5.

## 2019-12-17 NOTE — PLAN OF CARE
Problem: OT General Care Plan  Goal: OT Goal 1  Description  Will attend OT groups and participate actively in all OT opportunities. Will assess and set goals.     Note:   Attended the am OT group. She was pleasant, and initiated comments, mostly with author. With reminders to look at the computer monitor the  used in typing the conversation and comments, she took time to read and respond verbally. She continued to work on a familiar creative task, asked for supplies needed and proceeded with familiar steps independently. Affect brightened with interactions.

## 2019-12-17 NOTE — PROGRESS NOTES
[facility], [place]   Psychiatric Progress Note        Interim History:   The patient's care was discussed with the treatment team during the daily team meeting and/or staff's chart notes were reviewed.  Staff report patient eating well. Out for meals and eating well. Attending groups and social with peers. Rated dep and anx low. No SI or ASHER reported. No overt psychosis, sugar or confusion. Needs assistnace with ADL.     Utilized CART service during this encounter.  The patient was pleasant and fully engaged.  The patient noted that she is sleeping and eating well. She is mainly worried about disposition. She does not want to return home. Denied dep, anx and SI. Sleeping and eating well. No confusion, racing thoughts or hallucinations. Tolerating medications well.      Discussed medications and care plan.        Medications:       atorvastatin  20 mg Oral QPM     docusate sodium  100 mg Oral BID     fluPHENAZine  0.5 mg Oral BID     furosemide  40 mg Oral BID     [START ON 12/18/2019] glipiZIDE  7.5 mg Oral QAM AC     insulin aspart  3 Units Subcutaneous Once     PHENobarbital  8.1 mg Oral BID     pramox-pe-glycerin-petrolatum   Rectal TID          Allergies:     Allergies   Allergen Reactions     Strawberry Hives     Sulfa Drugs Nausea     mouth sores       Zomig [Zolmitriptan] Other (See Comments)     depression            Labs:     Recent Results (from the past 24 hour(s))   Glucose by meter    Collection Time: 12/16/19  8:18 PM   Result Value Ref Range    Glucose 268 (H) 70 - 99 mg/dL   Glucose by meter    Collection Time: 12/17/19  7:27 AM   Result Value Ref Range    Glucose 189 (H) 70 - 99 mg/dL          Psychiatric Examination:     Vitals:    12/16/19 2154 12/16/19 2155 12/17/19 0700 12/17/19 1243   BP: 133/61 107/56 (!) 163/84 (!) 143/72   Pulse:   64 82   Resp:       Temp:   97.7  F (36.5  C)    TempSrc:   Tympanic    SpO2:   95%    Weight:   50.4 kg (111 lb 3.2 oz)    Height:                          Sitting Orthostatic BP: 206/104      Sitting Orthostatic Pulse: 92 bpm                     Weight is 111 lbs 3.2 oz  Body mass index is 22.46 kg/m .    Appearance: awake, alert, adequately groomed, appeared as age stated and no apparent distress   Attitude:  cooperative  Eye Contact:  good  Mood:  anxious, depressed and better  Affect:  full range and reactive  Speech:  clear, coherent and normal prosody  Psychomotor Behavior:  no evidence of tardive dyskinesia, dystonia, or tics and tremor observed   Throught Process:  linear and goal oriented  Associations:  no loose associations  Thought Content:  no evidence of suicidal ideation or homicidal ideation and no evidence of psychotic thought  Insight:  fair  Judgement:  intact  Oriented to:  time, person, and place  Attention Span and Concentration:  intact  Recent and Remote Memory:  intact         Precautions:     Behavioral Orders   Procedures     Code 1 - Restrict to Unit     Fall precautions     Routine Programming     As clinically indicated     Single Room     Status 15     Every 15 minutes.     Status Individual Observation     High risk for falls; Pt. is impulsive.     Order Specific Question:   CONTINUOUS 24 hours / day     Answer:   5 feet     Order Specific Question:   Indications for SIO     Answer:   Medical equipment / ligature risk     Withdrawal precautions          Diagnoses:     1.  Schizophrenia by history.   2.  Rule out cognitive impairment due to chronic dementia-type process.   3.  Hard of hearing.          Plan:     Medications:  -- Librium was discontinued and placed on Benzodiazepine withdrawal protocol with Phenobarb taper. Phenobarb dose lowered to 8.1 mg BID x5 doses then discontinue.   -- Prolixin 0.5 mg, bid continued.   --PRN Hydroxyzine, Zyprexa, Trazodone.     Legal Status and Disposition:  -- volunt.   -- discharge to  once mood stabilization and safety in the community established.

## 2019-12-17 NOTE — PROGRESS NOTES
Brief Medicine Note    Chart reviewed. Events of last evening reviewed. Noted to by hypertensive with BP measurements on LE. Subsequent BP measurement on UE showed patient was normotensive.    Blood sugars reviewed. Recently transitioned from metformin to glipizide d/t low GFR.     Repeat BMP on 12/16 noted stable Cr on increased dose of lasix. Weight stable x 3 days despite increasing lasix. No hypoxia or pulmonary complaints noted.      ROUTINE IP LABS (Last four results)  Recent Labs   Lab 12/16/19  0653      POTASSIUM 4.3   CHLORIDE 106   CO2 30   ANIONGAP 2*   *   BUN 37*   CR 1.07*   BARBARA 8.7        Glucose Values Latest Ref Rng & Units 12/14/2019 12/15/2019 12/15/2019 12/16/2019 12/16/2019 12/16/2019 12/17/2019   Bedside Glucose (mg/dl )  - -- -- -- -- -- -- --   GLUCOSE 70 - 99 mg/dL 217(H) 221(H) 280(H) 220(H) 206(H) 268(H) 189(H)   Some recent data might be hidden               Plan:  - Continue to monitor BP closely; confirm elevated readings with upper extremity measurement  - Continue current dosing of lasix  - Repeat BMP 12/18  - Increase glipizide to 7.5mg daily  - Monitor for hypoglycemia    Medicine will continue to follow peripherally      Alexander Bhatti PA-C

## 2019-12-17 NOTE — PROGRESS NOTES
"Patient was visible in the lounge and did like walking the diaz with staff. Patient did very well in check in and in groups paying attention to conversations and using CART well. Patient did get rather tried a few times this shift after walks and long conversations. Denies SI and SIB and just waved her hand regarding depression and anxiety. Patient did have significant concerns about feeling controlled by her family and said it makes her '\"feel bad and smothered.\"  "

## 2019-12-17 NOTE — PROGRESS NOTES
Pt denied SI/SIB/HI. Pt was pleasant and cooperative with a bright affect. Pt is social with peers and staff. Pt attended meals and groups. Pt uses stand by assistance but is independent with most things. Pt uses gait belt and walker to ambulate. Pt is waiting to figure out placement for living arrangements. Pt took a shower with staff assistance x1. No concerns or issues to report otherwise.      12/17/19 1341   Behavioral Health   Hallucinations denies / not responding to hallucinations   Thinking distractable   Orientation person: oriented;place: oriented;date: oriented   Memory baseline memory   Insight admits / accepts   Judgement impaired   Eye Contact at examiner   Affect full range affect   Mood mood is calm   Physical Appearance/Attire attire appropriate to age and situation   Hygiene well groomed   Suicidality other (see comments)  (denied)   1. Wish to be Dead (Recent) No   2. Non-Specific Active Suicidal Thoughts (Recent) No   Self Injury other (see comment)  (denied)   Speech coherent;clear   Psychomotor / Gait balanced;steady   Psycho Education   Type of Intervention 1:1 intervention   Response participates, initiates socially appropriate   Hours 0.5   Activities of Daily Living   Hygiene/Grooming independent;with supervision   Oral Hygiene independent;with supervision   Dress with supervision;scrubs (behavioral health);street clothes   Laundry unable to complete   Room Organization unable   Activity   Activity Assistance Provided assistance, stand-by   Assistive Device Utilized gait belt;walker

## 2019-12-18 LAB
GLUCOSE BLDC GLUCOMTR-MCNC: 216 MG/DL (ref 70–99)
GLUCOSE BLDC GLUCOMTR-MCNC: 219 MG/DL (ref 70–99)

## 2019-12-18 PROCEDURE — 25000132 ZZH RX MED GY IP 250 OP 250 PS 637: Mod: GY | Performed by: PSYCHIATRY & NEUROLOGY

## 2019-12-18 PROCEDURE — 25000132 ZZH RX MED GY IP 250 OP 250 PS 637: Mod: GY | Performed by: PHYSICIAN ASSISTANT

## 2019-12-18 PROCEDURE — 99232 SBSQ HOSP IP/OBS MODERATE 35: CPT | Performed by: PSYCHIATRY & NEUROLOGY

## 2019-12-18 PROCEDURE — 25000132 ZZH RX MED GY IP 250 OP 250 PS 637: Mod: GY | Performed by: NURSE PRACTITIONER

## 2019-12-18 PROCEDURE — 12400002 ZZH R&B MH SENIOR/ADOLESCENT

## 2019-12-18 PROCEDURE — 00000146 ZZHCL STATISTIC GLUCOSE BY METER IP

## 2019-12-18 RX ADMIN — GLYCERIN, PETROLATUM, PHENYLEPHRINE HCL, PRAMOXINE HCL: 144; 2.5; 10; 15 CREAM TOPICAL at 21:00

## 2019-12-18 RX ADMIN — Medication 8.1 MG: at 20:57

## 2019-12-18 RX ADMIN — Medication 7.5 MG: at 09:01

## 2019-12-18 RX ADMIN — TRAZODONE HYDROCHLORIDE 50 MG: 50 TABLET ORAL at 00:30

## 2019-12-18 RX ADMIN — DOCUSATE SODIUM 100 MG: 100 CAPSULE, LIQUID FILLED ORAL at 20:58

## 2019-12-18 RX ADMIN — FUROSEMIDE 40 MG: 40 TABLET ORAL at 09:01

## 2019-12-18 RX ADMIN — Medication 8.1 MG: at 09:01

## 2019-12-18 RX ADMIN — Medication 0.5 MG: at 09:02

## 2019-12-18 RX ADMIN — GLYCERIN, PETROLATUM, PHENYLEPHRINE HCL, PRAMOXINE HCL: 144; 2.5; 10; 15 CREAM TOPICAL at 14:05

## 2019-12-18 RX ADMIN — DOCUSATE SODIUM 100 MG: 100 CAPSULE, LIQUID FILLED ORAL at 09:01

## 2019-12-18 RX ADMIN — FUROSEMIDE 40 MG: 40 TABLET ORAL at 13:34

## 2019-12-18 RX ADMIN — TRAZODONE HYDROCHLORIDE 50 MG: 50 TABLET ORAL at 22:40

## 2019-12-18 RX ADMIN — ATORVASTATIN CALCIUM 20 MG: 20 TABLET, FILM COATED ORAL at 20:58

## 2019-12-18 RX ADMIN — HYDROXYZINE HYDROCHLORIDE 25 MG: 25 TABLET, FILM COATED ORAL at 17:20

## 2019-12-18 RX ADMIN — Medication 0.5 MG: at 21:00

## 2019-12-18 RX ADMIN — ACETAMINOPHEN 650 MG: 325 TABLET, FILM COATED ORAL at 00:30

## 2019-12-18 RX ADMIN — ACETAMINOPHEN 650 MG: 325 TABLET, FILM COATED ORAL at 16:32

## 2019-12-18 RX ADMIN — GLYCERIN, PETROLATUM, PHENYLEPHRINE HCL, PRAMOXINE HCL: 144; 2.5; 10; 15 CREAM TOPICAL at 09:02

## 2019-12-18 ASSESSMENT — ACTIVITIES OF DAILY LIVING (ADL)
HYGIENE/GROOMING: WITH ASSISTANCE
ORAL_HYGIENE: INDEPENDENT
ORAL_HYGIENE: INDEPENDENT
HYGIENE/GROOMING: WITH ASSISTANCE
DRESS: SCRUBS (BEHAVIORAL HEALTH)
DRESS: INDEPENDENT;SCRUBS (BEHAVIORAL HEALTH);STREET CLOTHES

## 2019-12-18 ASSESSMENT — MIFFLIN-ST. JEOR: SCORE: 806.88

## 2019-12-18 NOTE — PROGRESS NOTES
12/17/19 2000   Therapeutic Recreation   Type of Intervention structured groups   Activity game   Response Observes from a distance   Hours 0.5     Pt attended the Therapeutic Recreation group with focus on leisure participation, social engagement, and critical thinking. Pt observed the group activity for the majority of the time she was in the group. Pt seemed confused on what the game was, when trying to take her turn. This writer wrote down some instructions, to try explaining the activity. Pt stated that she did not know what she was supposed to do. Pt observed the group for a little while longer, before leaving group about jail through the time.

## 2019-12-18 NOTE — PROGRESS NOTES
Pt has been pleasant and cooperative.   Writer was unable to get an accurate blood pressure on lower extremities (205/100's) and patient complains of pain and discomfort when taking blood pressure on lower extremities.  Blood  Pressure was taken on left upper arm 127/86 with a pulse of 62.    Pt continues to complain of constipation. Pt noted to have BM yesterday during the day and last evening.

## 2019-12-18 NOTE — PROGRESS NOTES
Patient participated in group today and appeared to enjoy the process and the topic of which she contributed very well. Patient denies SI and SIB as well as depression and anxiety. Patient did not comment on how she feels about her family and seemed unaffected by the question compared to yesterday when she had a strong reaction. Patient is assertive with her needs and says she does feel safe and supported on the unit.     Nutrition, metabolism, and development symptoms

## 2019-12-18 NOTE — PROGRESS NOTES
[facility], [place]   Psychiatric Progress Note        Interim History:   The patient's care was discussed with the treatment team during the daily team meeting and/or staff's chart notes were reviewed.  Staff report patient is doing well. Calm, pleasant and engaged. Rated dep and anx low. Out for meals, and eating well. attending groups and participating. Attending groups and social with peers. Rated dep and anx low. No SI or ASHER reported. No overt psychosis, sugar or confusion. Needs assistnace with ADL.     The patient was pleasant and fully engaged. No dep, anx or SI reported. Tolerating medications well and had no specific physical complaints. Sleep and appetite are intact. No confusion, racing thoughts or hallucinations. Tolerating medications well.      Discussed medications and care plan.        Medications:       atorvastatin  20 mg Oral QPM     docusate sodium  100 mg Oral BID     fluPHENAZine  0.5 mg Oral BID     furosemide  40 mg Oral BID     glipiZIDE  7.5 mg Oral QAM AC     insulin aspart  3 Units Subcutaneous Once     PHENobarbital  8.1 mg Oral BID     pramox-pe-glycerin-petrolatum   Rectal TID          Allergies:     Allergies   Allergen Reactions     Strawberry Hives     Sulfa Drugs Nausea     mouth sores       Zomig [Zolmitriptan] Other (See Comments)     depression            Labs:     Recent Results (from the past 24 hour(s))   Glucose by meter    Collection Time: 12/17/19  4:48 PM   Result Value Ref Range    Glucose 242 (H) 70 - 99 mg/dL   Glucose by meter    Collection Time: 12/18/19  8:57 AM   Result Value Ref Range    Glucose 219 (H) 70 - 99 mg/dL          Psychiatric Examination:     Vitals:    12/17/19 0700 12/17/19 1243 12/17/19 1700 12/18/19 0819   BP: (!) 163/84 (!) 143/72 138/79    Pulse: 64 82 76    Resp:       Temp: 97.7  F (36.5  C)  98.6  F (37  C)    TempSrc: Tympanic  Tympanic    SpO2: 95%  98%    Weight: 50.4 kg (111 lb 3.2 oz)   49.1 kg (108 lb 4.8  oz)   Height:                         Sitting Orthostatic BP: 206/104      Sitting Orthostatic Pulse: 92 bpm                     Weight is 108 lbs 4.8 oz  Body mass index is 21.87 kg/m .    Appearance: awake, alert, adequately groomed, appeared as age stated and no apparent distress   Attitude:  cooperative  Eye Contact:  good  Mood:  better  Affect:  full range and reactive  Speech:  clear, coherent and normal prosody  Psychomotor Behavior:  no evidence of tardive dyskinesia, dystonia, or tics and tremor observed   Throught Process:  linear and goal oriented  Associations:  no loose associations  Thought Content:  no evidence of suicidal ideation or homicidal ideation and no evidence of psychotic thought  Insight:  fair  Judgement:  intact  Oriented to:  time, person, and place  Attention Span and Concentration:  intact  Recent and Remote Memory:  intact         Precautions:     Behavioral Orders   Procedures     Code 1 - Restrict to Unit     Fall precautions     Routine Programming     As clinically indicated     Single Room     Status 15     Every 15 minutes.     Status Individual Observation     High risk for falls; Pt. is impulsive.     Order Specific Question:   CONTINUOUS 24 hours / day     Answer:   5 feet     Order Specific Question:   Indications for SIO     Answer:   Medical equipment / ligature risk     Withdrawal precautions          Diagnoses:     1.  Schizophrenia by history.   2.  Rule out cognitive impairment due to chronic dementia-type process.   3.  Hard of hearing.          Plan:     Medications:  -- Librium was discontinued and placed on Benzodiazepine withdrawal protocol with Phenobarb taper. Phenobarb taper completed uneventfully.   -- Prolixin 0.5 mg, bid continued.   --PRN Hydroxyzine, Zyprexa, Trazodone.     Legal Status and Disposition:  -- volunt.   -- discharge to  once mood stabilization and safety in the community established.

## 2019-12-18 NOTE — PLAN OF CARE
"  Problem: OT General Care Plan  Goal: OT Goal 1  Description  Will attend OT groups and participate actively in all OT opportunities. Will assess and set goals.     Note:   Attended 1 of 3 OT groups. She continued and worked independently on a creative familiar task. She planned and organized her design and initiated on several occasions if others could \"see what I'm doing?\" with the design. She stated she was having \"pain\" with bowel movements and when author explained her RN would be told this, the stated the name of her RN with accuracy. She appears interested in working on creative success oriented tasks. She appears alert and engaged.     "

## 2019-12-18 NOTE — PROGRESS NOTES
Patient complained of leg pain and requested for pain medication. She also complained that she was having sleeping dificulty.Tylenol 650 mg.  and Trazodone 50 mg. given PRN for pain and sleep. Bacilio was noted to be sleeping and snoring ten minutes after. Slept a total of 6.0 hours.

## 2019-12-19 LAB
GLUCOSE BLDC GLUCOMTR-MCNC: 160 MG/DL (ref 70–99)
GLUCOSE BLDC GLUCOMTR-MCNC: 295 MG/DL (ref 70–99)
GLUCOSE BLDC GLUCOMTR-MCNC: 88 MG/DL (ref 70–99)

## 2019-12-19 PROCEDURE — 99232 SBSQ HOSP IP/OBS MODERATE 35: CPT | Performed by: PSYCHIATRY & NEUROLOGY

## 2019-12-19 PROCEDURE — 12400002 ZZH R&B MH SENIOR/ADOLESCENT

## 2019-12-19 PROCEDURE — 25000132 ZZH RX MED GY IP 250 OP 250 PS 637: Mod: GY | Performed by: PHYSICIAN ASSISTANT

## 2019-12-19 PROCEDURE — 25000132 ZZH RX MED GY IP 250 OP 250 PS 637: Mod: GY | Performed by: NURSE PRACTITIONER

## 2019-12-19 PROCEDURE — 00000146 ZZHCL STATISTIC GLUCOSE BY METER IP

## 2019-12-19 PROCEDURE — 25000132 ZZH RX MED GY IP 250 OP 250 PS 637: Mod: GY | Performed by: PSYCHIATRY & NEUROLOGY

## 2019-12-19 RX ORDER — FUROSEMIDE 40 MG
40 TABLET ORAL DAILY
Status: DISCONTINUED | OUTPATIENT
Start: 2019-12-20 | End: 2019-12-31 | Stop reason: HOSPADM

## 2019-12-19 RX ADMIN — FUROSEMIDE 40 MG: 40 TABLET ORAL at 08:49

## 2019-12-19 RX ADMIN — ATORVASTATIN CALCIUM 20 MG: 20 TABLET, FILM COATED ORAL at 20:39

## 2019-12-19 RX ADMIN — Medication 0.5 MG: at 08:49

## 2019-12-19 RX ADMIN — Medication 0.5 MG: at 20:40

## 2019-12-19 RX ADMIN — GLYCERIN, PETROLATUM, PHENYLEPHRINE HCL, PRAMOXINE HCL: 144; 2.5; 10; 15 CREAM TOPICAL at 20:40

## 2019-12-19 RX ADMIN — DOCUSATE SODIUM 100 MG: 100 CAPSULE, LIQUID FILLED ORAL at 20:39

## 2019-12-19 RX ADMIN — HYDROXYZINE HYDROCHLORIDE 25 MG: 25 TABLET, FILM COATED ORAL at 23:01

## 2019-12-19 RX ADMIN — TRAZODONE HYDROCHLORIDE 50 MG: 50 TABLET ORAL at 23:00

## 2019-12-19 RX ADMIN — DOCUSATE SODIUM 100 MG: 100 CAPSULE, LIQUID FILLED ORAL at 08:48

## 2019-12-19 RX ADMIN — GLYCERIN, PETROLATUM, PHENYLEPHRINE HCL, PRAMOXINE HCL: 144; 2.5; 10; 15 CREAM TOPICAL at 08:49

## 2019-12-19 RX ADMIN — FUROSEMIDE 40 MG: 40 TABLET ORAL at 14:39

## 2019-12-19 RX ADMIN — GLYCERIN, PETROLATUM, PHENYLEPHRINE HCL, PRAMOXINE HCL: 144; 2.5; 10; 15 CREAM TOPICAL at 14:39

## 2019-12-19 RX ADMIN — Medication 7.5 MG: at 08:48

## 2019-12-19 ASSESSMENT — ACTIVITIES OF DAILY LIVING (ADL)
ORAL_HYGIENE: WITH ASSISTANCE
HYGIENE/GROOMING: WITH ASSISTANCE
ORAL_HYGIENE: WITH ASSISTANCE
DRESS: WITH ASSISTANCE
HYGIENE/GROOMING: WITH ASSISTANCE
DRESS: WITH ASSISTANCE
LAUNDRY: UNABLE TO COMPLETE

## 2019-12-19 ASSESSMENT — MIFFLIN-ST. JEOR: SCORE: 823.21

## 2019-12-19 NOTE — PROGRESS NOTES
Pt has been accepted for admission at [facility]. PAS completed.  #[id number].    Notified pt's son-in-law, Tomasz.

## 2019-12-19 NOTE — PLAN OF CARE
"48 HOUR NURSING ASSESSMENT:  Pt has been pleasant and cooperative. Pt is interactive with staff and peers. Pt describes her mood as \"good\" and \"I like it here\". Staff has been using a note pad or word document to write conversations for patient as she is very hard of hearing. Pt has been attending some programming. Pt denies SI/SIB/hallucinations. Pt has been somewhat perseverative in regards to her bowels. Pt has had 2 mod-large BM's this shift. Pt has been offered/accepted prune juice during the day with meals. Pt continues to need SBA with ADL's due to unsteadiness.  Pt has been using a walker and staff has been using a gait belt for safety. Pt has been fairly steady on her feet when ambulating in the diaz.   Pt has been eating % of meals  Pt has documented sleep between 5 and 6.75 hours a night.  Pt has been medication compliant. In the last 48 hours patient has used prn tylenol 650 mg x 2, prn vistaril 25 mg x 1, and prn trazodone 50 mg x 2.  Pt's son -in-law/Tomasz visited patient over lunch today.     Pt noted to have sores on lip this afternoon as well as sores on her tongue. Dr. Pro requested to have medical follow up. Prieto CARTER notified and will follow up tomorrow with patient. He was also updated on weight gain 3+ lbs. No new orders at this time.   "

## 2019-12-19 NOTE — PROGRESS NOTES
Patient c/o rectal discomfort.  Tylenol given, tucks wipes used on rectal area.  Patient no longer c/o the discomfort.        patient again c/o of rectal discomfort.  Will continue to use tucks and prep-H.

## 2019-12-19 NOTE — PROGRESS NOTES
"[facility], [place]   Psychiatric Progress Note        Interim History:   The patient's care was discussed with the treatment team during the daily team meeting and/or staff's chart notes were reviewed.  Staff report patient is doing well. A bit more perseverative and focused on BM. Calm, pleasant and engaged. Rated dep and anx low. Attending groups and social with peers. R Out for meals, and eating well. attending groups and participating. No overt psychosis, sugar or confusion. Needs assistnace with ADL.     The patient was pleasant and fully engaged. Noted feeling \"well\".  Sleep and appetite are intact.  Denied dep, anx and SI reported. Tolerating medications well and had no specific physical complaints.  No confusion, racing thoughts or hallucinations. Tolerating medications well.      Discussed medications and care plan.        Medications:       atorvastatin  20 mg Oral QPM     docusate sodium  100 mg Oral BID     fluPHENAZine  0.5 mg Oral BID     [START ON 12/20/2019] furosemide  40 mg Oral Daily     glipiZIDE  7.5 mg Oral QAM AC     insulin aspart  3 Units Subcutaneous Once     pramox-pe-glycerin-petrolatum   Rectal TID          Allergies:     Allergies   Allergen Reactions     Strawberry Hives     Sulfa Drugs Nausea     mouth sores       Zomig [Zolmitriptan] Other (See Comments)     depression            Labs:     Recent Results (from the past 24 hour(s))   Glucose by meter    Collection Time: 12/18/19  5:18 PM   Result Value Ref Range    Glucose 216 (H) 70 - 99 mg/dL   Glucose by meter    Collection Time: 12/19/19  8:43 AM   Result Value Ref Range    Glucose 295 (H) 70 - 99 mg/dL   Glucose by meter    Collection Time: 12/19/19 12:11 PM   Result Value Ref Range    Glucose 160 (H) 70 - 99 mg/dL          Psychiatric Examination:     Vitals:    12/18/19 0819 12/18/19 1333 12/18/19 2000 12/19/19 0900   BP:  129/62 (!) 173/72    Pulse:  77 76    Resp:       Temp:   98.1  F (36.7  C)  "   TempSrc:   Tympanic    SpO2:       Weight: 49.1 kg (108 lb 4.8 oz)   50.8 kg (111 lb 14.4 oz)   Height:                         Sitting Orthostatic BP: 206/104      Sitting Orthostatic Pulse: 92 bpm                     Weight is 111 lbs 14.4 oz  Body mass index is 22.6 kg/m .    Appearance: awake, alert, adequately groomed, appeared as age stated and no apparent distress   Attitude:  cooperative  Eye Contact:  good  Mood:  better  Affect:  full range and reactive  Speech:  clear, coherent and normal prosody  Psychomotor Behavior:  no evidence of tardive dyskinesia, dystonia, or tics and tremor observed   Throught Process:  linear and goal oriented  Associations:  no loose associations  Thought Content:  no evidence of suicidal ideation or homicidal ideation and no evidence of psychotic thought  Insight:  fair  Judgement:  intact  Oriented to:  time, person, and place  Attention Span and Concentration:  intact  Recent and Remote Memory:  intact         Precautions:     Behavioral Orders   Procedures     Code 1 - Restrict to Unit     Fall precautions     Routine Programming     As clinically indicated     Single Room     Status 15     Every 15 minutes.     Status Individual Observation     High risk for falls; Pt. is impulsive.     Order Specific Question:   CONTINUOUS 24 hours / day     Answer:   5 feet     Order Specific Question:   Indications for SIO     Answer:   Medical equipment / ligature risk     Withdrawal precautions          Diagnoses:     1.  Schizophrenia by history.   2.  Rule out cognitive impairment due to chronic dementia-type process.   3.  Hard of hearing.          Plan:     Medications:  -- Librium was discontinued and placed on Benzodiazepine withdrawal protocol with Phenobarb taper. Phenobarb taper completed uneventfully.   -- Prolixin 0.5 mg, bid continued.   --PRN Hydroxyzine, Zyprexa, Trazodone.     Legal Status and Disposition:  -- volunt.   -- discharge to  once mood stabilization and  safety in the community established.

## 2019-12-19 NOTE — PROGRESS NOTES
Pt's blood sugar was elevated this AM (295) as patient had started eating her breakfast including 240 ml of prune juice. Prieto CHAND/PA notified that this was not a fasting BG.

## 2019-12-20 LAB
GLUCOSE BLDC GLUCOMTR-MCNC: 162 MG/DL (ref 70–99)
GLUCOSE BLDC GLUCOMTR-MCNC: 231 MG/DL (ref 70–99)

## 2019-12-20 PROCEDURE — G0177 OPPS/PHP; TRAIN & EDUC SERV: HCPCS

## 2019-12-20 PROCEDURE — 25000132 ZZH RX MED GY IP 250 OP 250 PS 637: Mod: GY | Performed by: NURSE PRACTITIONER

## 2019-12-20 PROCEDURE — 99232 SBSQ HOSP IP/OBS MODERATE 35: CPT | Performed by: NURSE PRACTITIONER

## 2019-12-20 PROCEDURE — 12400002 ZZH R&B MH SENIOR/ADOLESCENT

## 2019-12-20 PROCEDURE — 00000146 ZZHCL STATISTIC GLUCOSE BY METER IP

## 2019-12-20 PROCEDURE — 25000132 ZZH RX MED GY IP 250 OP 250 PS 637: Mod: GY | Performed by: PHYSICIAN ASSISTANT

## 2019-12-20 PROCEDURE — 25000132 ZZH RX MED GY IP 250 OP 250 PS 637: Mod: GY | Performed by: PSYCHIATRY & NEUROLOGY

## 2019-12-20 RX ADMIN — TRAZODONE HYDROCHLORIDE 50 MG: 50 TABLET ORAL at 23:25

## 2019-12-20 RX ADMIN — Medication 7.5 MG: at 08:14

## 2019-12-20 RX ADMIN — FUROSEMIDE 40 MG: 40 TABLET ORAL at 08:14

## 2019-12-20 RX ADMIN — Medication 0.5 MG: at 20:25

## 2019-12-20 RX ADMIN — Medication 0.5 MG: at 08:15

## 2019-12-20 RX ADMIN — DOCUSATE SODIUM 100 MG: 100 CAPSULE, LIQUID FILLED ORAL at 08:14

## 2019-12-20 RX ADMIN — WITCH HAZEL: 500 SOLUTION RECTAL; TOPICAL at 20:30

## 2019-12-20 RX ADMIN — GLYCERIN, PETROLATUM, PHENYLEPHRINE HCL, PRAMOXINE HCL 1 G: 144; 2.5; 10; 15 CREAM TOPICAL at 08:17

## 2019-12-20 RX ADMIN — DOCUSATE SODIUM 100 MG: 100 CAPSULE, LIQUID FILLED ORAL at 20:26

## 2019-12-20 RX ADMIN — HYDROXYZINE HYDROCHLORIDE 25 MG: 25 TABLET, FILM COATED ORAL at 23:25

## 2019-12-20 RX ADMIN — ATORVASTATIN CALCIUM 20 MG: 20 TABLET, FILM COATED ORAL at 20:25

## 2019-12-20 RX ADMIN — ACETAMINOPHEN 650 MG: 325 TABLET, FILM COATED ORAL at 08:16

## 2019-12-20 ASSESSMENT — MIFFLIN-ST. JEOR: SCORE: 812.32

## 2019-12-20 ASSESSMENT — ACTIVITIES OF DAILY LIVING (ADL)
LAUNDRY: UNABLE TO COMPLETE
DRESS: WITH ASSISTANCE
HYGIENE/GROOMING: INDEPENDENT
HYGIENE/GROOMING: INDEPENDENT
ORAL_HYGIENE: INDEPENDENT
ORAL_HYGIENE: INDEPENDENT
DRESS: WITH ASSISTANCE

## 2019-12-20 NOTE — PROGRESS NOTES
[facility], [place]   Psychiatric Progress Note        Interim History:   From H&P: The patient is a 89-year-old woman who was admitted due to concerns of psychosis.  She requires a significant amount of cares from her family.  Here in the hospital, she seems extremely unstable on her feet.   Here, she has talked about knowing there are people living in her tree and that there are shadows that come into her room at night; however, she does not seem distressed by them.  She states that her family thinks she is crazy and her family is just jealous because the people in her tree and the shadows that come in at night, love her.  The patient denies that these shadows or people are problematic for her.  In fact, when she is talking to nursing, she would be curious statements about wondering how they managed to live outside, given it is so cold.  When I asked if there is anything I can do for her, she asked me just to believe her.  She was not agitated, not out of control.  Really, did not demonstrate any signs that she required this level of care.  She definitely is displaying psychosis but it appears that she likely could be managed at this point in time.  I believe that we will need to get further collateral directly from family for a better idea of exactly what we are treating.  The patient is extremely hard of hearing and thus the only way to communicate with her is either via writing or as we had done, use a computer with a word processing program and extremely large font that she can read.     The patient's care was discussed with the treatment team during the daily team meeting and/or staff's chart notes were reviewed.  Staff report patient has been calm, pleasant, cooperative. Patient is attending groups. Mood is good. She is bright and social. Denies AH/VH. Patient is eating well and taking medications as prescribed. Needs assist of 1 for transfer, walking and ADLs. Slept 6 hours.      Met with patient. She was able to recognize me. Mood is good. Main concern is constipation. According to the staff she has regular soft BM's. She is eating and sleeping well. Denies AH/VH. Denies depression and anxiety. Waiting for placement.            Medications:       atorvastatin  20 mg Oral QPM     docusate sodium  100 mg Oral BID     fluPHENAZine  0.5 mg Oral BID     furosemide  40 mg Oral Daily     glipiZIDE  7.5 mg Oral QAM AC     insulin aspart  3 Units Subcutaneous Once     pramox-pe-glycerin-petrolatum   Rectal TID          Allergies:     Allergies   Allergen Reactions     Strawberry Hives     Sulfa Drugs Nausea     mouth sores       Zomig [Zolmitriptan] Other (See Comments)     depression            Labs:     Recent Results (from the past 672 hour(s))   UA with Microscopic    Collection Time: 12/05/19 12:14 PM   Result Value Ref Range    Color Urine Yellow     Appearance Urine Clear     Glucose Urine Negative NEG^Negative mg/dL    Bilirubin Urine Negative NEG^Negative    Ketones Urine Negative NEG^Negative mg/dL    Specific Gravity Urine 1.012 1.003 - 1.035    Blood Urine Negative NEG^Negative    pH Urine 7.0 5.0 - 7.0 pH    Protein Albumin Urine Negative NEG^Negative mg/dL    Urobilinogen mg/dL 0.0 0.0 - 2.0 mg/dL    Nitrite Urine Negative NEG^Negative    Leukocyte Esterase Urine Small (A) NEG^Negative    Source Midstream Urine     WBC Urine 5 0 - 5 /HPF    RBC Urine 2 0 - 2 /HPF    Bacteria Urine Few (A) NEG^Negative /HPF    Mucous Urine Present (A) NEG^Negative /LPF   Urine Culture    Collection Time: 12/05/19 12:14 PM   Result Value Ref Range    Specimen Description Midstream Urine     Special Requests Specimen received in preservative     Culture Micro       10,000 to 50,000 colonies/mL  mixed urogenital sincere     CBC with platelets differential    Collection Time: 12/05/19 12:30 PM   Result Value Ref Range    WBC 7.2 4.0 - 11.0 10e9/L    RBC Count 4.77 3.8 - 5.2 10e12/L    Hemoglobin 13.8 11.7  - 15.7 g/dL    Hematocrit 43.8 35.0 - 47.0 %    MCV 92 78 - 100 fl    MCH 28.9 26.5 - 33.0 pg    MCHC 31.5 31.5 - 36.5 g/dL    RDW 13.4 10.0 - 15.0 %    Platelet Count 168 150 - 450 10e9/L    Diff Method Automated Method     % Neutrophils 67.9 %    % Lymphocytes 20.4 %    % Monocytes 8.4 %    % Eosinophils 2.4 %    % Basophils 0.6 %    % Immature Granulocytes 0.3 %    Nucleated RBCs 0 0 /100    Absolute Neutrophil 4.9 1.6 - 8.3 10e9/L    Absolute Lymphocytes 1.5 0.8 - 5.3 10e9/L    Absolute Monocytes 0.6 0.0 - 1.3 10e9/L    Absolute Eosinophils 0.2 0.0 - 0.7 10e9/L    Absolute Basophils 0.0 0.0 - 0.2 10e9/L    Abs Immature Granulocytes 0.0 0 - 0.4 10e9/L    Absolute Nucleated RBC 0.0    Comprehensive metabolic panel    Collection Time: 12/05/19 12:30 PM   Result Value Ref Range    Sodium 144 133 - 144 mmol/L    Potassium 4.0 3.4 - 5.3 mmol/L    Chloride 106 94 - 109 mmol/L    Carbon Dioxide 30 20 - 32 mmol/L    Anion Gap 8 3 - 14 mmol/L    Glucose 114 (H) 70 - 99 mg/dL    Urea Nitrogen 25 7 - 30 mg/dL    Creatinine 1.14 (H) 0.52 - 1.04 mg/dL    GFR Estimate 41 (L) >60 mL/min/[1.73_m2]    GFR Estimate If Black 48 (L) >60 mL/min/[1.73_m2]    Calcium 9.9 8.5 - 10.1 mg/dL    Bilirubin Total 0.6 0.2 - 1.3 mg/dL    Albumin 3.8 3.4 - 5.0 g/dL    Protein Total 6.9 6.8 - 8.8 g/dL    Alkaline Phosphatase 83 40 - 150 U/L    ALT 35 0 - 50 U/L    AST 27 0 - 45 U/L   Glucose by meter    Collection Time: 12/06/19  8:39 PM   Result Value Ref Range    Glucose 176 (H) 70 - 99 mg/dL   Fluphenazine level    Collection Time: 12/07/19  8:19 AM   Result Value Ref Range    Fluphenazine Level <0.2 (L) 1.0 - 10.0 ng/mL   Vitamin B12    Collection Time: 12/07/19  8:19 AM   Result Value Ref Range    Vitamin B12 517 193 - 986 pg/mL   Folate    Collection Time: 12/07/19  8:19 AM   Result Value Ref Range    Folate 12.7 >5.4 ng/mL   Vitamin D    Collection Time: 12/07/19  8:19 AM   Result Value Ref Range    Vitamin D Deficiency screening 23 20 -  75 ug/L   Chlordiazepoxide serum blood level: Laboratory Miscellaneous Order    Collection Time: 12/07/19  8:19 AM   Result Value Ref Range    Miscellaneous Test         Specimen Received, Reordered and sent to Performing laboratory - Report to follow upon   completion.     Comprehensive metabolic panel    Collection Time: 12/07/19  8:19 AM   Result Value Ref Range    Sodium 138 133 - 144 mmol/L    Potassium 4.3 3.4 - 5.3 mmol/L    Chloride 103 94 - 109 mmol/L    Carbon Dioxide 32 20 - 32 mmol/L    Anion Gap 3 3 - 14 mmol/L    Glucose 166 (H) 70 - 99 mg/dL    Urea Nitrogen 30 7 - 30 mg/dL    Creatinine 1.16 (H) 0.52 - 1.04 mg/dL    GFR Estimate 41 (L) >60 mL/min/[1.73_m2]    GFR Estimate If Black 47 (L) >60 mL/min/[1.73_m2]    Calcium 9.3 8.5 - 10.1 mg/dL    Bilirubin Total 0.5 0.2 - 1.3 mg/dL    Albumin 3.4 3.4 - 5.0 g/dL    Protein Total 6.6 (L) 6.8 - 8.8 g/dL    Alkaline Phosphatase 93 40 - 150 U/L    ALT 30 0 - 50 U/L    AST 25 0 - 45 U/L   Hemoglobin A1c    Collection Time: 12/07/19  8:19 AM   Result Value Ref Range    Hemoglobin A1C 7.5 (H) 0 - 5.6 %   [facility]    Collection Time: 12/07/19  8:19 AM   Result Value Ref Range    Result SEE NOTE 12/17/2019 10:53 AM     Test Name CHLORDIAZEPOXIDE AND METABOLITE SERUM      Send Outs Misc Test Code CDP     Send Outs Misc Test Specimen Serum    Glucose by meter    Collection Time: 12/07/19  5:08 PM   Result Value Ref Range    Glucose 271 (H) 70 - 99 mg/dL   Glucose by meter    Collection Time: 12/07/19  8:57 PM   Result Value Ref Range    Glucose 309 (H) 70 - 99 mg/dL   Glucose by meter    Collection Time: 12/07/19 11:12 PM   Result Value Ref Range    Glucose 182 (H) 70 - 99 mg/dL   Glucose by meter    Collection Time: 12/08/19  7:23 AM   Result Value Ref Range    Glucose 190 (H) 70 - 99 mg/dL   Glucose by meter    Collection Time: 12/08/19 12:10 PM   Result Value Ref Range    Glucose 113 (H) 70 - 99 mg/dL   Glucose by meter    Collection Time: 12/08/19   5:01 PM   Result Value Ref Range    Glucose 191 (H) 70 - 99 mg/dL   Glucose by meter    Collection Time: 12/09/19  7:22 AM   Result Value Ref Range    Glucose 136 (H) 70 - 99 mg/dL   Basic metabolic panel    Collection Time: 12/09/19  8:07 AM   Result Value Ref Range    Sodium 139 133 - 144 mmol/L    Potassium 3.6 3.4 - 5.3 mmol/L    Chloride 104 94 - 109 mmol/L    Carbon Dioxide 31 20 - 32 mmol/L    Anion Gap 4 3 - 14 mmol/L    Glucose 204 (H) 70 - 99 mg/dL    Urea Nitrogen 29 7 - 30 mg/dL    Creatinine 1.07 (H) 0.52 - 1.04 mg/dL    GFR Estimate 45 (L) >60 mL/min/[1.73_m2]    GFR Estimate If Black 52 (L) >60 mL/min/[1.73_m2]    Calcium 8.9 8.5 - 10.1 mg/dL   EKG 12-lead, complete    Collection Time: 12/09/19 11:59 AM   Result Value Ref Range    Interpretation ECG Click View Image link to view waveform and result    Glucose by meter    Collection Time: 12/09/19 12:04 PM   Result Value Ref Range    Glucose 171 (H) 70 - 99 mg/dL   Glucose by meter    Collection Time: 12/09/19  4:54 PM   Result Value Ref Range    Glucose 179 (H) 70 - 99 mg/dL   Glucose by meter    Collection Time: 12/10/19  8:00 AM   Result Value Ref Range    Glucose 111 (H) 70 - 99 mg/dL   Glucose by meter    Collection Time: 12/10/19 12:59 PM   Result Value Ref Range    Glucose 354 (H) 70 - 99 mg/dL   Glucose by meter    Collection Time: 12/10/19  5:15 PM   Result Value Ref Range    Glucose 196 (H) 70 - 99 mg/dL   Glucose by meter    Collection Time: 12/11/19  7:52 AM   Result Value Ref Range    Glucose 169 (H) 70 - 99 mg/dL   Glucose by meter    Collection Time: 12/11/19  5:25 PM   Result Value Ref Range    Glucose 184 (H) 70 - 99 mg/dL   Glucose by meter    Collection Time: 12/12/19  7:47 AM   Result Value Ref Range    Glucose 157 (H) 70 - 99 mg/dL   Glucose by meter    Collection Time: 12/12/19  4:44 PM   Result Value Ref Range    Glucose 212 (H) 70 - 99 mg/dL   Glucose by meter    Collection Time: 12/13/19  7:28 AM   Result Value Ref Range     Glucose 203 (H) 70 - 99 mg/dL   Glucose by meter    Collection Time: 12/13/19  5:04 PM   Result Value Ref Range    Glucose 199 (H) 70 - 99 mg/dL   Glucose by meter    Collection Time: 12/14/19  7:54 AM   Result Value Ref Range    Glucose 217 (H) 70 - 99 mg/dL   Glucose by meter    Collection Time: 12/15/19  7:52 AM   Result Value Ref Range    Glucose 221 (H) 70 - 99 mg/dL   Glucose by meter    Collection Time: 12/15/19  4:45 PM   Result Value Ref Range    Glucose 280 (H) 70 - 99 mg/dL   Basic metabolic panel    Collection Time: 12/16/19  6:53 AM   Result Value Ref Range    Sodium 138 133 - 144 mmol/L    Potassium 4.3 3.4 - 5.3 mmol/L    Chloride 106 94 - 109 mmol/L    Carbon Dioxide 30 20 - 32 mmol/L    Anion Gap 2 (L) 3 - 14 mmol/L    Glucose 220 (H) 70 - 99 mg/dL    Urea Nitrogen 37 (H) 7 - 30 mg/dL    Creatinine 1.07 (H) 0.52 - 1.04 mg/dL    GFR Estimate 45 (L) >60 mL/min/[1.73_m2]    GFR Estimate If Black 52 (L) >60 mL/min/[1.73_m2]    Calcium 8.7 8.5 - 10.1 mg/dL   Glucose by meter    Collection Time: 12/16/19  7:58 AM   Result Value Ref Range    Glucose 206 (H) 70 - 99 mg/dL   Glucose by meter    Collection Time: 12/16/19  8:18 PM   Result Value Ref Range    Glucose 268 (H) 70 - 99 mg/dL   Glucose by meter    Collection Time: 12/17/19  7:27 AM   Result Value Ref Range    Glucose 189 (H) 70 - 99 mg/dL   Glucose by meter    Collection Time: 12/17/19  4:48 PM   Result Value Ref Range    Glucose 242 (H) 70 - 99 mg/dL   Glucose by meter    Collection Time: 12/18/19  8:57 AM   Result Value Ref Range    Glucose 219 (H) 70 - 99 mg/dL   Glucose by meter    Collection Time: 12/18/19  5:18 PM   Result Value Ref Range    Glucose 216 (H) 70 - 99 mg/dL   Glucose by meter    Collection Time: 12/19/19  8:43 AM   Result Value Ref Range    Glucose 295 (H) 70 - 99 mg/dL   Glucose by meter    Collection Time: 12/19/19 12:11 PM   Result Value Ref Range    Glucose 160 (H) 70 - 99 mg/dL   Glucose by meter    Collection Time:  12/19/19  5:42 PM   Result Value Ref Range    Glucose 88 70 - 99 mg/dL   Glucose by meter    Collection Time: 12/20/19  7:59 AM   Result Value Ref Range    Glucose 231 (H) 70 - 99 mg/dL            Psychiatric Examination:   Temp: 97.5  F (36.4  C) Temp src: Tympanic BP: (!) 204/80 Pulse: 80   Resp: 16 SpO2: 96 % O2 Device: None (Room air)    Weight is 111 lbs 14.4 oz  Body mass index is 22.6 kg/m .    Appearance: awake, alert, cooperative, no apparent distress and thin  Attitude:  cooperative  Eye Contact:  good  Mood:  sad   Affect:  mood congruent  Speech:  clear, coherent  Psychomotor Behavior:  no evidence of tardive dyskinesia, dystonia, or tics  Throught Process:  illogical  Associations:  no loose associations  Thought Content:  no evidence of suicidal ideation or homicidal ideation, no auditory hallucinations present and no visual hallucinations present  Insight:  fair  Judgement:  fair  Oriented to:  self and date only  Attention Span and Concentration:  fair  Recent and Remote Memory:  fair         Precautions:     Behavioral Orders   Procedures     Code 1 - Restrict to Unit     Fall precautions     Routine Programming     As clinically indicated     Single Room     Status 15     Every 15 minutes.     Status Individual Observation     High risk for falls; Pt. is impulsive.     Order Specific Question:   CONTINUOUS 24 hours / day     Answer:   5 feet     Order Specific Question:   Indications for SIO     Answer:   Medical equipment / ligature risk     Withdrawal precautions          DIagnoses:   1.  Schizophrenia by history.   2.  Rule out cognitive impairment due to chronic dementia-type process.   3.  Extremely hard of hearing.   4.  Type 2 diabetes.   5.  Chronic kidney disease.   6.  Heart failure.   7.  Hypertension.          Plan:   --Discontinue Librium  --Phenobarbital taper compleated  --Continue Prolixin 0.5 mg, bid.   --PRN Hydroxyzine, Zyprexa, Trazodone.   --MSSA for benzo withdrawal  --Blood  work was reviewed  --IM to follow up for medical problems.      Disposition Plan   Reason for ongoing admission: is unable to care for self due to schizophrenia and likely dementia   Disposition: LTC  Estimated length of stay: 7-10 days  Legal Status:  voluntary  Discharge will be granted once symptoms improved.  Dr. Pro will assume care on Monday.     Celestino NAM CNP  Date: 12/20/19  Time: 2:01 PM

## 2019-12-20 NOTE — PLAN OF CARE
Problem: OT General Care Plan  Goal: OT Goal 1  Description  Will attend OT groups and participate actively in all OT opportunities. Will assess and set goals.     Note:   Attended 2 of 3 OT groups. She seemed more attentive to watching the monitor provided by the  for her to read comments others were stating. She initiated asking questions on a couple occasions and stated needs as seeming needed. She in the am session that her hand was sore. She also initiated a request about working on a task familiar to her which was brought out by staff in the lounge and with supplies kept in her room in the locked cabinet. She offered direct eye contact with others.

## 2019-12-20 NOTE — PROGRESS NOTES
"Pt continues on SIO for falls. Ambulates with walker, gait belt and assist of 1. Gait steady and slow. Pt verbalized she is not scared of dying. Full range affect, mood is calm, social with staff and peers. No redness observed on pt's coccyx area. Preparation H applied to hemorrhoids.  Pt has sores on her lip and tongue, per AM note MINH Walters is aware and will follow up tomorrow. Pt c/o feeling \"whinded\" when laying flat in bed, O2 98% on RA, pt HOB elevated, pt verbalized relief, will continue to monitor.   "

## 2019-12-20 NOTE — PROGRESS NOTES
Pt is using walker to ambulate.  She does get up and walks briskly down the diaz.  Likes to spend time in bathroom, toileting and brushing teeth.  Pleasant and calm mood.  Med compliant.  Expresses no acute concerns today.    Last BM was reported as soft and normal.  Does have hemorrhoids present, and uses the prep H/Tucks for comfort.  Frequent position changes per orders.      Attending groups with  present.

## 2019-12-21 LAB
GLUCOSE BLDC GLUCOMTR-MCNC: 188 MG/DL (ref 70–99)
GLUCOSE BLDC GLUCOMTR-MCNC: 223 MG/DL (ref 70–99)

## 2019-12-21 PROCEDURE — 25000132 ZZH RX MED GY IP 250 OP 250 PS 637: Mod: GY | Performed by: PHYSICIAN ASSISTANT

## 2019-12-21 PROCEDURE — 00000146 ZZHCL STATISTIC GLUCOSE BY METER IP

## 2019-12-21 PROCEDURE — 25000132 ZZH RX MED GY IP 250 OP 250 PS 637: Mod: GY | Performed by: NURSE PRACTITIONER

## 2019-12-21 PROCEDURE — 12400002 ZZH R&B MH SENIOR/ADOLESCENT

## 2019-12-21 PROCEDURE — 25000132 ZZH RX MED GY IP 250 OP 250 PS 637: Mod: GY | Performed by: PSYCHIATRY & NEUROLOGY

## 2019-12-21 RX ADMIN — HYDROXYZINE HYDROCHLORIDE 25 MG: 25 TABLET, FILM COATED ORAL at 03:04

## 2019-12-21 RX ADMIN — Medication 0.5 MG: at 20:21

## 2019-12-21 RX ADMIN — Medication 0.5 MG: at 08:53

## 2019-12-21 RX ADMIN — ATORVASTATIN CALCIUM 20 MG: 20 TABLET, FILM COATED ORAL at 20:21

## 2019-12-21 RX ADMIN — GLYCERIN, PETROLATUM, PHENYLEPHRINE HCL, PRAMOXINE HCL: 144; 2.5; 10; 15 CREAM TOPICAL at 13:59

## 2019-12-21 RX ADMIN — FUROSEMIDE 40 MG: 40 TABLET ORAL at 08:52

## 2019-12-21 RX ADMIN — DOCUSATE SODIUM 100 MG: 100 CAPSULE, LIQUID FILLED ORAL at 20:21

## 2019-12-21 RX ADMIN — DOCUSATE SODIUM 100 MG: 100 CAPSULE, LIQUID FILLED ORAL at 08:52

## 2019-12-21 RX ADMIN — Medication 7.5 MG: at 08:52

## 2019-12-21 RX ADMIN — GLYCERIN, PETROLATUM, PHENYLEPHRINE HCL, PRAMOXINE HCL: 144; 2.5; 10; 15 CREAM TOPICAL at 08:32

## 2019-12-21 RX ADMIN — TRAZODONE HYDROCHLORIDE 50 MG: 50 TABLET ORAL at 03:04

## 2019-12-21 ASSESSMENT — ACTIVITIES OF DAILY LIVING (ADL)
LAUNDRY: UNABLE TO COMPLETE
HYGIENE/GROOMING: INDEPENDENT
ORAL_HYGIENE: INDEPENDENT
ORAL_HYGIENE: INDEPENDENT
DRESS: WITH ASSISTANCE
LAUNDRY: UNABLE TO COMPLETE
HYGIENE/GROOMING: INDEPENDENT
DRESS: STREET CLOTHES;SCRUBS (BEHAVIORAL HEALTH);WITH ASSISTANCE

## 2019-12-21 ASSESSMENT — MIFFLIN-ST. JEOR: SCORE: 810.96

## 2019-12-21 NOTE — PLAN OF CARE
on  SIO ,Patient is out in the lounge. Pt use note/pad to communicate. Social to staff. Compliant to medications.Had a good breakfast , declined lunch. Still occupied about having constipation. Prep h/tucks applied as needed/scheduled. Needing SBA with ambulation ( walker) /ADLs. Took medications with pudding.     Pt endorses mood is OK.Affect is full range. Denies being depressed.   Anxious about her care after discharge from here. Had SI last night, no plans just desire . Denies any hallucinations.        Attends PM group .

## 2019-12-21 NOTE — PROGRESS NOTES
Patient slept the first half of the shift. Was awake @ 0300 and went to the bathroom with standby assist. Patient requested for sleeping and anxiety medications. Trazodone 50 mg. and Atarax 25 mg. given @ 0304 PRN for sleep and anxiety. Stayed awake the following hour. Slept a total of three hours.

## 2019-12-21 NOTE — PROGRESS NOTES
Pt has been visible in the milieu. She has been painting several staff members on the unit. She ambulates with a walker with one person assist.    She repositioned throughout the shift. She was assisted to the restroom several times this shift. She was really focused on having a BM; she was unable to have a BM, although passing gas    Pt denied SI/SIB/Hallucinations.    Pt was medication compliant.    Good appetite. BG was 162.    No issues noted.

## 2019-12-22 LAB
ALBUMIN UR-MCNC: NEGATIVE MG/DL
APPEARANCE UR: CLEAR
BACTERIA #/AREA URNS HPF: ABNORMAL /HPF
BILIRUB UR QL STRIP: NEGATIVE
COLOR UR AUTO: ABNORMAL
GLUCOSE BLDC GLUCOMTR-MCNC: 160 MG/DL (ref 70–99)
GLUCOSE BLDC GLUCOMTR-MCNC: 190 MG/DL (ref 70–99)
GLUCOSE UR STRIP-MCNC: NEGATIVE MG/DL
HGB UR QL STRIP: NEGATIVE
KETONES UR STRIP-MCNC: NEGATIVE MG/DL
LEUKOCYTE ESTERASE UR QL STRIP: ABNORMAL
MUCOUS THREADS #/AREA URNS LPF: PRESENT /LPF
NITRATE UR QL: NEGATIVE
PH UR STRIP: 5.5 PH (ref 5–7)
RBC #/AREA URNS AUTO: 1 /HPF (ref 0–2)
SOURCE: ABNORMAL
SP GR UR STRIP: 1.01 (ref 1–1.03)
SQUAMOUS #/AREA URNS AUTO: <1 /HPF (ref 0–1)
UROBILINOGEN UR STRIP-MCNC: NORMAL MG/DL (ref 0–2)
WBC #/AREA URNS AUTO: 10 /HPF (ref 0–5)

## 2019-12-22 PROCEDURE — 25000132 ZZH RX MED GY IP 250 OP 250 PS 637: Mod: GY | Performed by: PHYSICIAN ASSISTANT

## 2019-12-22 PROCEDURE — 25000132 ZZH RX MED GY IP 250 OP 250 PS 637: Mod: GY | Performed by: NURSE PRACTITIONER

## 2019-12-22 PROCEDURE — 00000146 ZZHCL STATISTIC GLUCOSE BY METER IP

## 2019-12-22 PROCEDURE — 25000132 ZZH RX MED GY IP 250 OP 250 PS 637: Mod: GY | Performed by: PSYCHIATRY & NEUROLOGY

## 2019-12-22 PROCEDURE — 81001 URINALYSIS AUTO W/SCOPE: CPT | Performed by: NURSE PRACTITIONER

## 2019-12-22 PROCEDURE — 12400002 ZZH R&B MH SENIOR/ADOLESCENT

## 2019-12-22 PROCEDURE — 87086 URINE CULTURE/COLONY COUNT: CPT | Performed by: INTERNAL MEDICINE

## 2019-12-22 RX ADMIN — ATORVASTATIN CALCIUM 20 MG: 20 TABLET, FILM COATED ORAL at 20:07

## 2019-12-22 RX ADMIN — DOCUSATE SODIUM 100 MG: 100 CAPSULE, LIQUID FILLED ORAL at 09:01

## 2019-12-22 RX ADMIN — GLYCERIN, PETROLATUM, PHENYLEPHRINE HCL, PRAMOXINE HCL: 144; 2.5; 10; 15 CREAM TOPICAL at 09:02

## 2019-12-22 RX ADMIN — TRAZODONE HYDROCHLORIDE 50 MG: 50 TABLET ORAL at 00:39

## 2019-12-22 RX ADMIN — Medication 0.5 MG: at 09:01

## 2019-12-22 RX ADMIN — Medication 7.5 MG: at 09:01

## 2019-12-22 RX ADMIN — Medication 0.5 MG: at 20:07

## 2019-12-22 RX ADMIN — FUROSEMIDE 40 MG: 40 TABLET ORAL at 09:01

## 2019-12-22 RX ADMIN — HYDROXYZINE HYDROCHLORIDE 25 MG: 25 TABLET, FILM COATED ORAL at 00:39

## 2019-12-22 RX ADMIN — GLYCERIN, PETROLATUM, PHENYLEPHRINE HCL, PRAMOXINE HCL: 144; 2.5; 10; 15 CREAM TOPICAL at 13:44

## 2019-12-22 RX ADMIN — DOCUSATE SODIUM 100 MG: 100 CAPSULE, LIQUID FILLED ORAL at 20:07

## 2019-12-22 RX ADMIN — GLYCERIN, PETROLATUM, PHENYLEPHRINE HCL, PRAMOXINE HCL: 144; 2.5; 10; 15 CREAM TOPICAL at 20:09

## 2019-12-22 ASSESSMENT — ACTIVITIES OF DAILY LIVING (ADL)
DRESS: SCRUBS (BEHAVIORAL HEALTH)
HYGIENE/GROOMING: WITH ASSISTANCE
HYGIENE/GROOMING: INDEPENDENT
ORAL_HYGIENE: INDEPENDENT
ORAL_HYGIENE: INDEPENDENT
DRESS: SCRUBS (BEHAVIORAL HEALTH)
LAUNDRY: UNABLE TO COMPLETE
LAUNDRY: UNABLE TO COMPLETE

## 2019-12-22 ASSESSMENT — MIFFLIN-ST. JEOR: SCORE: 812.32

## 2019-12-22 NOTE — PROGRESS NOTES
"Pt was active in the milieu today, attending groups, social with select peers and staff, painting in lounge, walking around the unit, able to shower today with staff assistance. Pt had difficulty participating in group today despite help from an , as she spoke at a whisper and seemed to not follow the conversation accurately. Pt often took minutes to respond, and would respond to a previous question even if she had been asked a different one more recently. Pt often perseverated on not wanting to have hallucinations today; pt later started to perseverate on having a BM, and continued despite having a large movement after her shower. Staff unable to collect urine sample as pt too disorganized to understand why one was needed, or how to do so. In check-in, pt was irritable because she was painting and did not want to be \"disturbed\", but with gentle prompting she answered questions. Pt denied SI/SIB and feelings of depression (pt said \"not yet\"). Pt expressed anxiety about having hallucinations, and when asked if she was experiencing those she said \"I hope not\" and had a scared expression on her face. Pt says her mood has been fine, the day was okay, and feels that her thoughts have been clear today. Of note, writer had to re-ask questions because pt responded illogically (ie. when asked if all her needs were met, said that her dog painting needed better hair). PT continued to paint after check-in and socialized with peers and staff.     12/22/19 1400 Behavioral Health   Hallucinations denies / not responding to hallucinations;other (see comment)  (per pt \"I hope not. I don't wany any\")   Thinking distractable;delusional;confused;poor concentration   Orientation person: oriented;place: oriented;time: oriented   Memory other (see comment)  (pt did not recognize some staff that worked w/ her yesterday)   Insight poor   Judgement impaired   Eye Contact at examiner   Affect full range affect   Mood " "anxious;other (see comments)   Physical Appearance/Attire neat   Hygiene well groomed   1. Wish to be Dead (Recent) No   2. Non-Specific Active Suicidal Thoughts (Recent) No   Self Injury other (see comment)  (none stated or observed)   Elopement   (none observed)   Activity other (see comment)  (active in milieu)   Speech rambling;other (see comments);circumstantial  (clear at times, but often tangential/rambling/illogical)   Medication Sensitivity other (see comment)  (per pt her skin feels \"strange\")   Psychomotor / Gait tremors;unsteady;slow   Psycho Education   Type of Intervention 1:1 intervention   Response participates with encouragement   Hours 0.5   Treatment Detail   (check-in)   Activities of Daily Living   Hygiene/Grooming with assistance   Oral Hygiene independent   Dress scrubs (behavioral health)   Laundry unable to complete   Room Organization unable   Activity   Activity Assistance Provided assistance, stand-by   Assistive Device Utilized walker;gait belt     "

## 2019-12-22 NOTE — PLAN OF CARE
Pt presents with full range affect and anxious mood. Denies SI/SIB. Pt was having AH at the beginning of the shift, reporting to the psych associate while tolieting that she was hearing birds. She spent most of the shift in the lounge talking with staff, patients, and painting. Pt had spoke with psych associates about wanting to have her sister visit with her. Advised pt that we could possibly have staff contact her sister tomorrow about possibly arranging this. No other concerns or complaints at this time.

## 2019-12-22 NOTE — PROGRESS NOTES
"Patient awake at 0030 reporting difficulty sleeping, rambling.  Per 1:1 staff, patient has been making odd statements such as, \"I want to die.\"  Patient oriented.  Denies a plan.  Patient reporting that she desires to remain on the unit indefinitely.  \"I like it here.\"  PRN trazodone 50 mg and Atarax 25 mg given for sleep and anxiety.  Will continue to monitor.    "

## 2019-12-23 ENCOUNTER — APPOINTMENT (OUTPATIENT)
Dept: ULTRASOUND IMAGING | Facility: CLINIC | Age: 84
DRG: 885 | End: 2019-12-23
Attending: PHYSICIAN ASSISTANT
Payer: MEDICARE

## 2019-12-23 LAB
ANION GAP SERPL CALCULATED.3IONS-SCNC: 7 MMOL/L (ref 3–14)
BACTERIA SPEC CULT: NORMAL
BUN SERPL-MCNC: 32 MG/DL (ref 7–30)
CALCIUM SERPL-MCNC: 9.2 MG/DL (ref 8.5–10.1)
CHLORIDE SERPL-SCNC: 108 MMOL/L (ref 94–109)
CO2 SERPL-SCNC: 26 MMOL/L (ref 20–32)
CREAT SERPL-MCNC: 1.15 MG/DL (ref 0.52–1.04)
GFR SERPL CREATININE-BSD FRML MDRD: 41 ML/MIN/{1.73_M2}
GLUCOSE BLDC GLUCOMTR-MCNC: 162 MG/DL (ref 70–99)
GLUCOSE BLDC GLUCOMTR-MCNC: 182 MG/DL (ref 70–99)
GLUCOSE SERPL-MCNC: 260 MG/DL (ref 70–99)
Lab: NORMAL
POTASSIUM SERPL-SCNC: 4 MMOL/L (ref 3.4–5.3)
SODIUM SERPL-SCNC: 141 MMOL/L (ref 133–144)
SPECIMEN SOURCE: NORMAL

## 2019-12-23 PROCEDURE — G0177 OPPS/PHP; TRAIN & EDUC SERV: HCPCS

## 2019-12-23 PROCEDURE — 99207 ZZC CDG-MDM COMPONENT: MEETS MODERATE - UP CODED: CPT | Performed by: PHYSICIAN ASSISTANT

## 2019-12-23 PROCEDURE — 36415 COLL VENOUS BLD VENIPUNCTURE: CPT | Performed by: PHYSICIAN ASSISTANT

## 2019-12-23 PROCEDURE — 25000132 ZZH RX MED GY IP 250 OP 250 PS 637: Mod: GY | Performed by: NURSE PRACTITIONER

## 2019-12-23 PROCEDURE — 25000132 ZZH RX MED GY IP 250 OP 250 PS 637: Mod: GY | Performed by: PHYSICIAN ASSISTANT

## 2019-12-23 PROCEDURE — 00000146 ZZHCL STATISTIC GLUCOSE BY METER IP

## 2019-12-23 PROCEDURE — 80048 BASIC METABOLIC PNL TOTAL CA: CPT | Performed by: PHYSICIAN ASSISTANT

## 2019-12-23 PROCEDURE — 93971 EXTREMITY STUDY: CPT | Mod: LT

## 2019-12-23 PROCEDURE — 99232 SBSQ HOSP IP/OBS MODERATE 35: CPT | Performed by: PHYSICIAN ASSISTANT

## 2019-12-23 PROCEDURE — 99232 SBSQ HOSP IP/OBS MODERATE 35: CPT | Performed by: PSYCHIATRY & NEUROLOGY

## 2019-12-23 PROCEDURE — 25000132 ZZH RX MED GY IP 250 OP 250 PS 637: Mod: GY | Performed by: PSYCHIATRY & NEUROLOGY

## 2019-12-23 PROCEDURE — 12400002 ZZH R&B MH SENIOR/ADOLESCENT

## 2019-12-23 RX ORDER — CEPHALEXIN 500 MG/1
500 CAPSULE ORAL EVERY 8 HOURS SCHEDULED
Status: DISCONTINUED | OUTPATIENT
Start: 2019-12-23 | End: 2019-12-23

## 2019-12-23 RX ORDER — CEFTRIAXONE 1 G/1
1 INJECTION, POWDER, FOR SOLUTION INTRAMUSCULAR; INTRAVENOUS EVERY 24 HOURS
Status: DISCONTINUED | OUTPATIENT
Start: 2019-12-23 | End: 2019-12-23

## 2019-12-23 RX ADMIN — Medication 7.5 MG: at 09:16

## 2019-12-23 RX ADMIN — DOCUSATE SODIUM 100 MG: 100 CAPSULE, LIQUID FILLED ORAL at 09:17

## 2019-12-23 RX ADMIN — Medication 0.5 MG: at 20:15

## 2019-12-23 RX ADMIN — CEPHALEXIN 250 MG: 250 CAPSULE ORAL at 20:16

## 2019-12-23 RX ADMIN — ATORVASTATIN CALCIUM 20 MG: 20 TABLET, FILM COATED ORAL at 20:15

## 2019-12-23 RX ADMIN — GLYCERIN, PETROLATUM, PHENYLEPHRINE HCL, PRAMOXINE HCL: 144; 2.5; 10; 15 CREAM TOPICAL at 15:14

## 2019-12-23 RX ADMIN — Medication 0.5 MG: at 09:16

## 2019-12-23 RX ADMIN — CEPHALEXIN 250 MG: 250 CAPSULE ORAL at 11:47

## 2019-12-23 RX ADMIN — TRAZODONE HYDROCHLORIDE 50 MG: 50 TABLET ORAL at 20:21

## 2019-12-23 RX ADMIN — DOCUSATE SODIUM 100 MG: 100 CAPSULE, LIQUID FILLED ORAL at 20:15

## 2019-12-23 RX ADMIN — GLYCERIN, PETROLATUM, PHENYLEPHRINE HCL, PRAMOXINE HCL: 144; 2.5; 10; 15 CREAM TOPICAL at 09:16

## 2019-12-23 RX ADMIN — GLYCERIN, PETROLATUM, PHENYLEPHRINE HCL, PRAMOXINE HCL: 144; 2.5; 10; 15 CREAM TOPICAL at 20:15

## 2019-12-23 RX ADMIN — FUROSEMIDE 40 MG: 40 TABLET ORAL at 09:16

## 2019-12-23 ASSESSMENT — ACTIVITIES OF DAILY LIVING (ADL)
ORAL_HYGIENE: INDEPENDENT
DRESS: WITH ASSISTANCE
HYGIENE/GROOMING: WITH ASSISTANCE

## 2019-12-23 NOTE — PROGRESS NOTES
RN paged to review UA. With pyuria. LE+. However unclear if pt symptomatic from that. Afebrile. Vss.   Ct to monitor   Follow-up UC- added.   Did not start on antibiotics yet.    Cross Cover.

## 2019-12-23 NOTE — PROGRESS NOTES
"[facility], [place]   Psychiatric Progress Note        Interim History:   The patient's care was discussed with the treatment team during the daily team meeting and/or staff's chart notes were reviewed.  Staff report patient has been a bit more confused and disorganized past 2 days. UA was obtained and started on antibiotics. Well. Calm, pleasant and engaged. Rated dep and anx low. No SI or ASHER.  Attending groups and social with peers. Out for meals and eating OK. Slept 6hrs last night. Needs assistnace with ADL.     The patient was pleasant. Reported feeling anxious but no dep, or SI reported. Worried about disposition. Not fully oriented and does not recall with this provider. She believes that this provider is being investigated by the FBI, \"because you are from [place]\". Tells me that appetite was poor today and requested am Ensure. Sleeping well. No hallucinations or racing thoughts reported.     Discussed medications and care plan.        Medications:       atorvastatin  20 mg Oral QPM     cephALEXin  250 mg Oral Q8H MEGHAN     docusate sodium  100 mg Oral BID     fluPHENAZine  0.5 mg Oral BID     furosemide  40 mg Oral Daily     glipiZIDE  7.5 mg Oral QAM AC     pramox-pe-glycerin-petrolatum   Rectal TID          Allergies:     Allergies   Allergen Reactions     Strawberry Hives     Sulfa Drugs Nausea     mouth sores       Zomig [Zolmitriptan] Other (See Comments)     depression            Labs:     Recent Results (from the past 24 hour(s))   Glucose by meter    Collection Time: 12/22/19  4:40 PM   Result Value Ref Range    Glucose 190 (H) 70 - 99 mg/dL   UA with Microscopic    Collection Time: 12/22/19  4:42 PM   Result Value Ref Range    Color Urine Light Yellow     Appearance Urine Clear     Glucose Urine Negative NEG^Negative mg/dL    Bilirubin Urine Negative NEG^Negative    Ketones Urine Negative NEG^Negative mg/dL    Specific Gravity Urine 1.013 1.003 - 1.035    Blood Urine " Negative NEG^Negative    pH Urine 5.5 5.0 - 7.0 pH    Protein Albumin Urine Negative NEG^Negative mg/dL    Urobilinogen mg/dL Normal 0.0 - 2.0 mg/dL    Nitrite Urine Negative NEG^Negative    Leukocyte Esterase Urine Moderate (A) NEG^Negative    Source Unspecified Urine     WBC Urine 10 (H) 0 - 5 /HPF    RBC Urine 1 0 - 2 /HPF    Bacteria Urine Few (A) NEG^Negative /HPF    Squamous Epithelial /HPF Urine <1 0 - 1 /HPF    Mucous Urine Present (A) NEG^Negative /LPF   Urine Culture Aerobic Bacterial    Collection Time: 12/22/19  4:42 PM   Result Value Ref Range    Specimen Description Unspecified Urine     Special Requests Specimen received in preservative     Culture Micro Culture negative < 24 hours, reincubate    Glucose by meter    Collection Time: 12/23/19  9:06 AM   Result Value Ref Range    Glucose 182 (H) 70 - 99 mg/dL   Basic metabolic panel    Collection Time: 12/23/19 10:15 AM   Result Value Ref Range    Sodium 141 133 - 144 mmol/L    Potassium 4.0 3.4 - 5.3 mmol/L    Chloride 108 94 - 109 mmol/L    Carbon Dioxide 26 20 - 32 mmol/L    Anion Gap 7 3 - 14 mmol/L    Glucose 260 (H) 70 - 99 mg/dL    Urea Nitrogen 32 (H) 7 - 30 mg/dL    Creatinine 1.15 (H) 0.52 - 1.04 mg/dL    GFR Estimate 41 (L) >60 mL/min/[1.73_m2]    GFR Estimate If Black 48 (L) >60 mL/min/[1.73_m2]    Calcium 9.2 8.5 - 10.1 mg/dL          Psychiatric Examination:     Vitals:    12/21/19 1700 12/22/19 0900 12/22/19 1616 12/23/19 0900   BP: 135/54  (!) 146/71    Pulse: 79  69    Resp:  20  16   Temp: 98.4  F (36.9  C) 98.6  F (37  C) 99  F (37.2  C) 98.2  F (36.8  C)   TempSrc: Oral Tympanic Tympanic Tympanic   SpO2: 96% 96%  96%   Weight:  49.7 kg (109 lb 8 oz)     Height:                         Sitting Orthostatic BP: 206/104      Sitting Orthostatic Pulse: 92 bpm                     Weight is 109 lbs 8 oz  Body mass index is 22.12 kg/m .    Appearance: awake, alert, adequately groomed, appeared as age stated and no apparent distress    Attitude:  cooperative  Eye Contact:  good  Mood:  anxious and better  Affect:  full range and reactive  Speech:  clear, coherent and normal prosody  Psychomotor Behavior:  no evidence of tardive dyskinesia, dystonia, or tics and tremor observed   Throught Process:  more difficulty staying on task and distracted.   Associations:  no loose associations  Thought Content:  no evidence of suicidal ideation or homicidal ideation, no auditory hallucinations present, no visual hallucinations present and paranoia suspected.   Insight:  fair  Judgement:  intact  Oriented to:  partial  Attention Span and Concentration:  limited  Recent and Remote Memory:  limited         Precautions:     Behavioral Orders   Procedures     Code 1 - Restrict to Unit     Fall precautions     Routine Programming     As clinically indicated     Single Room     Status 15     Every 15 minutes.     Status Individual Observation     High risk for falls; Pt. is impulsive.     Order Specific Question:   CONTINUOUS 24 hours / day     Answer:   5 feet     Order Specific Question:   Indications for SIO     Answer:   Medical equipment / ligature risk          Diagnoses:     1.  Schizophrenia by history.   2.  Rule out cognitive impairment due to chronic dementia-type process.   3.  Hard of hearing.          Plan:     Medications:  -- Librium was discontinued and placed on Benzodiazepine withdrawal protocol with Phenobarb taper. Phenobarb taper completed uneventfully.   -- Prolixin 0.5 mg, bid continued.   -- PRN Hydroxyzine, Zyprexa, Trazodone.     Legal Status and Disposition:  -- volunt.   -- discharge to  once mood stabilization and safety in the community established.

## 2019-12-23 NOTE — PROGRESS NOTES
Status Individual Observation [BHS60] (Order [id number])   Behavioral Health Services   Date: 12/21/2019 Department: [address] Ordering/Authorizing: Celestino Mills APRN CNP   Order Information     Order Date/Time Release Date/Time Start Date/Time End Date/Time   12/21/19 11:30 AM None 12/21/19 11:30 AM Until Specified   Order Details     Frequency Duration Priority Order Class   EFFECTIVE NOW Until Specified Routine Hospital Performed   Order Providers     Authorizing Provider Encounter Provider   Celestino Mills APRN CNP None     Ordering Provider's NPI: [id number]  Celestino Mills   Status Individual Observation [[id number]]     Awaiting signature from: Celestino Mills APRN CNP Status: Active   Mode: Ordering in Verbal with readback mode Communicated by: Rudy Pardo RN   Ordering user: Rudy Pardo RN 12/21/19 1130 Ordering provider: Celestino Mills APRN CNP   Order History   Inpatient   Date/Time Action Taken User Additional Information   12/21/19 1130 Sign Rudy Pardo RN Modify from Order: [id number]; Ordering Mode: Verbal with readback   12/21/19 1130 Release Instance Rudy Pardo RN (auto-released) Released Order: [id number]   12/21/19 1240 Acknowledge Mikki Montes RN New Order   Standing Order Information     Remaining Occurrences Interval Last Released      0/1 EFFECTIVE NOW 12/21/2019             Released Orders       Released On Scheduled For Released By    1. 12/21/2019 11:30 AM 12/21/2019 11:30 AM Rudy Pardo RN (auto-released)                  Source Order Set     Order Set Name Order ID    [id number]   Comments     High risk for falls; Pt. is impulsive.          Detailed Information     Priority and Order Details           Order Questions     Question Answer Comment   CONTINUOUS 24 hours / day 5 feet    Indications for SIO Medical equipment / ligature risk           Collection Information     Acknowledgement Info      For At Acknowledged By Acknowledged On   Placing Order [id number] Mikki Montes RN 19 1240   Patient Information     Patient Name  Michaela Soria ([id number]) Sex  Female   1927

## 2019-12-23 NOTE — PROGRESS NOTES
12/23/19 1524   Behavioral Health   Hallucinations auditory   Thinking confused;distractable;paranoid;poor concentration   Orientation person: oriented;place: oriented;date: oriented   Memory baseline memory   Insight poor   Judgement impaired   Eye Contact at examiner   Affect sad;full range affect   Mood mood is calm   Physical Appearance/Attire attire appropriate to age and situation   Hygiene well groomed   Suicidality other (see comments)  (None)   1. Wish to be Dead (Recent) No   2. Non-Specific Active Suicidal Thoughts (Recent) No       Pt was active and social within the milieu. Pt did not have a good appetite today, eating only half of her meals. Pt also seemed to have trouble eating today as well, needing assistance with feeding. Pt attended all groups today and participated. Pt also was able to visit with her son and daughter today. They stated to her that they were able to find housing for her and were able to sign for it as well. Pt otherwise today was very pleasant just confused at time with some of her statements, such as mixing up staff names and speaking out of turn about things not relevant to the conversation.

## 2019-12-23 NOTE — PLAN OF CARE
Problem: OT General Care Plan  Goal: OT Goal 1  Description  Will attend OT groups and participate actively in all OT opportunities. Will assess and set goals.     Note:   Attended 1 of 2 OT groups. She needed cues to follow through with familiar steps on an activity she works on consistently at every group. She was less responsive verbally to questions addressed to her and needed more cues to look at the prompter with the words typed by the . Comments were offered with more brief and less elaboration. She appeared less organized and in need of more assistance on task. She offered less direct eye contact.

## 2019-12-23 NOTE — PROGRESS NOTES
"Pt visible in milieu throughout shift. Pt is oriented x4. Pt seems to be hallucinating and having more delusions this evening than in previous shifts. Pt laughed at times at seemingly nothing. After a few times of this happening, writer asked pt what was funny, pt responding by saying a phone number (listing too many numbers) followed by \"my beauty parlour, [place], appointment.\"  After this pt told the writer \"leave.\" and pt repeated \"leave\" until writer asked why, pt then repeated the phone number and following statement.     Pt continues to perseverate on bowels, much more this evening than yesterday. Pt had conversations with multiple other pts and staff about her bowels. Pt did not accept redirection from this topic. Staff would ask pt to not speak about that to other pts or change subject, pt would say \"okay\" and return to talking about bowels.    Writer attempted to have conversation with pt about holiday plans, but pt was unable to track conversation. Pt would read a question that writer posed and answer with something that did not make sense.     Pt was also mimicking the movements of 1:1 staff for a while this evening. At this same time pt stopped talking almost entirely- pt began pointing at things and mouthing words rather than talking like usual. Pt did not understand the question when asked about this.     On the way to bed, pt began to follow a staff leaving the unit. Writer got pt's attention and she turned around. Unsure if pt was attempting to elope or was just confused.     12/22/19 2100   Behavioral Health   Hallucinations appears responding   Thinking delusional;distractable   Orientation person: oriented;place: oriented;date: oriented;time: oriented   Memory new learning, recall loss   Insight poor   Judgement impaired   Eye Contact at examiner;staring   Affect full range affect   Mood mood is calm   Physical Appearance/Attire attire appropriate to age and situation   Hygiene well groomed "   Suicidality   (denies)   1. Wish to be Dead (Recent) No   2. Non-Specific Active Suicidal Thoughts (Recent) No   Self Injury   (denies)   Elopement   (see note)   Activity other (see comment)  (in milieu)   Speech other (see comments)  (variable, sometimes tangential, sometimes incoherent.)   Medication Sensitivity no stated side effects;no observed side effects   Psychomotor / Gait tremors;slow;steady   Psycho Education   Type of Intervention 1:1 intervention   Response participates, initiates socially appropriate   Hours 0.5   Treatment Detail check in

## 2019-12-23 NOTE — PROGRESS NOTES
Left voice message for Sarika at [facility] ([phone], to determine if pt's Level II Obra screening will be complete.

## 2019-12-23 NOTE — PROGRESS NOTES
"Overnight, pt urinated about 400 mL of fluid. Her pull-up was saturated and there was about 100 mL in the hat in her toilet.    Pt woke up at about 5:30. She was distressed saying she couldn't sleep as she was having a \"bad hallucination.\" She then began moving her arms (seemingly purposefully to her) randomly around.. Pt was singing, \"3,2,3,7,1,0, my beautician, this is not funny, it is 4 months old, leave the light on please, i'm not joking, the bible, johnna, my thumb\" She was repeating these phrases and a few others over and over.   "

## 2019-12-23 NOTE — PROGRESS NOTES
Internal Medicine Follow Up Note     Patient: Michaela Soria  MRN: [id number]  Admission Date: 12/6/2019  Hospital Day # 17    Assessment & Recommendations: Michaela Soria is a 89 year old woman with a history of HFpEF, implanted pacemaker, CKD, DM, HLD, HTN, bilateral breast Ca s/p bilateral mastectomies, schizophrenia, resting tremor  who is admitted to station 3B with psychiatric decompensation. Medicine seeing today for question of UTI, HFpEF and HTN.     Addendum:  Left LE edema  Unknown onset, noted by nursing on afternoon assessment. Exam c/w nontender 3+ pitting edema and dependent blanching rubor.  Scar over left knee c/w remote replacement. Suspect secondary to HFpEF and chronic venous insufficiency. Cannot r/o clot or infection  -LE doppler, assess for DVT  -was started on Keflex for ? UTI today, monitor interval improvement  --Another addendum, follow up on LE doppler shows no DVT      HTN  Not symptomatic of HTN and bp on legs is painful, bp 135-/71 today  -refrain from leg bp asssessments  -given setting of potential UTI, trend for now and consider adding amlodipine  -no ACEI given CKD    Question of UTI  UA with +LE and 10 Wbc, no nitrites and few bacteria. May represent UTI and pt noted to have increased confusion today   -assess bladder scan  -empiric keflex renally adjusted (d/w pharmacy)    HFpEF  Wt is stable and may not be completely reliable if taken with clothes and various times of day, no hypoxia   -continue I&O and daily weights  -continue daily lasix    Medicine will follow up on urine culture, bp and wts, please page with any additional concerns.     Tari Mclain PA-C  Hospitalist Service  Pager: [phone]  _________________________________________________________________    Subjective & Interval History:  Chief complaint of confusion in setting of schizophrenia    UA +LE and 10 Wbc, no nitrites and few bacteria. Cannot r/o UTI.  Pt noted by nursing to repeat hand washing and  "appears more forgetful today.     Last 24 hour care team notes reviewed.   ROS: 4 point ROS (including Respiratory, CV, GI and ) was performed and negative unless otherwise noted in HPI.       Medications: Reviewed in EPIC.    Physical Exam:    Blood pressure (!) 146/71, pulse 69, temperature 99  F (37.2  C), temperature source Tympanic, resp. rate 20, height 1.499 m (4' 11\"), weight 49.7 kg (109 lb 8 oz), SpO2 96 %, not currently breastfeeding.    Gen: elderly, pleasant, seated in wheelchair  Chest: nonlabored breathing  LE: LLE 3+ pitting edema of the ankle and foot, nontender, +blanching dependent rubor of the left foot, scar over left knee, 1+ pitting edema right medial malleolus, + 2+ PT and DP pulses, 5/5 strength and intact ROM of the bilateral feet and ankles, no point tenderness    Labs & Studies of Note: I personally reviewed the following studies:  basic metabolic panel, UA    "

## 2019-12-23 NOTE — PROGRESS NOTES
Pt noted to be incontinent of a large amount of urine at 1430. Pt also voided a large amount in toilet. Pt missed collection container so this was not measured. Pt has one previous void this AM.   Pt was scanned for 326 ml post void.    Pt noted to have a very swollen and sore left ankle and foot. Medical team notified.    Pt noted to be very distracted this shift and has had a hard time staying focused.  Pt has also had odd behaviors such as talking in a whisper at times. Pt also declined to eat lunch. Pt ate fair when fed today.

## 2019-12-24 LAB
ANION GAP SERPL CALCULATED.3IONS-SCNC: 7 MMOL/L (ref 3–14)
BUN SERPL-MCNC: 30 MG/DL (ref 7–30)
CALCIUM SERPL-MCNC: 8.5 MG/DL (ref 8.5–10.1)
CHLORIDE SERPL-SCNC: 107 MMOL/L (ref 94–109)
CO2 SERPL-SCNC: 26 MMOL/L (ref 20–32)
CREAT SERPL-MCNC: 1.01 MG/DL (ref 0.52–1.04)
GFR SERPL CREATININE-BSD FRML MDRD: 48 ML/MIN/{1.73_M2}
GLUCOSE BLDC GLUCOMTR-MCNC: 209 MG/DL (ref 70–99)
GLUCOSE BLDC GLUCOMTR-MCNC: 210 MG/DL (ref 70–99)
GLUCOSE SERPL-MCNC: 218 MG/DL (ref 70–99)
POTASSIUM SERPL-SCNC: 4.2 MMOL/L (ref 3.4–5.3)
SODIUM SERPL-SCNC: 140 MMOL/L (ref 133–144)

## 2019-12-24 PROCEDURE — 99232 SBSQ HOSP IP/OBS MODERATE 35: CPT | Performed by: PHYSICIAN ASSISTANT

## 2019-12-24 PROCEDURE — 25000132 ZZH RX MED GY IP 250 OP 250 PS 637: Mod: GY | Performed by: PHYSICIAN ASSISTANT

## 2019-12-24 PROCEDURE — 99232 SBSQ HOSP IP/OBS MODERATE 35: CPT | Performed by: PSYCHIATRY & NEUROLOGY

## 2019-12-24 PROCEDURE — 25000132 ZZH RX MED GY IP 250 OP 250 PS 637: Mod: GY | Performed by: NURSE PRACTITIONER

## 2019-12-24 PROCEDURE — 80048 BASIC METABOLIC PNL TOTAL CA: CPT | Performed by: PHYSICIAN ASSISTANT

## 2019-12-24 PROCEDURE — G0463 HOSPITAL OUTPT CLINIC VISIT: HCPCS

## 2019-12-24 PROCEDURE — 25000132 ZZH RX MED GY IP 250 OP 250 PS 637: Mod: GY | Performed by: PSYCHIATRY & NEUROLOGY

## 2019-12-24 PROCEDURE — 99207 ZZC CDG-MDM COMPONENT: MEETS MODERATE - UP CODED: CPT | Performed by: PHYSICIAN ASSISTANT

## 2019-12-24 PROCEDURE — 12400002 ZZH R&B MH SENIOR/ADOLESCENT

## 2019-12-24 PROCEDURE — 00000146 ZZHCL STATISTIC GLUCOSE BY METER IP

## 2019-12-24 PROCEDURE — 36415 COLL VENOUS BLD VENIPUNCTURE: CPT | Performed by: PHYSICIAN ASSISTANT

## 2019-12-24 RX ORDER — LISINOPRIL 5 MG/1
5 TABLET ORAL DAILY
Status: DISCONTINUED | OUTPATIENT
Start: 2019-12-24 | End: 2019-12-26

## 2019-12-24 RX ADMIN — Medication 0.5 MG: at 19:26

## 2019-12-24 RX ADMIN — DOCUSATE SODIUM 100 MG: 100 CAPSULE, LIQUID FILLED ORAL at 19:26

## 2019-12-24 RX ADMIN — Medication 7.5 MG: at 09:20

## 2019-12-24 RX ADMIN — CEPHALEXIN 250 MG: 250 CAPSULE ORAL at 05:18

## 2019-12-24 RX ADMIN — LISINOPRIL 5 MG: 5 TABLET ORAL at 09:21

## 2019-12-24 RX ADMIN — GLYCERIN, PETROLATUM, PHENYLEPHRINE HCL, PRAMOXINE HCL: 144; 2.5; 10; 15 CREAM TOPICAL at 19:26

## 2019-12-24 RX ADMIN — DOCUSATE SODIUM 100 MG: 100 CAPSULE, LIQUID FILLED ORAL at 09:21

## 2019-12-24 RX ADMIN — TRAZODONE HYDROCHLORIDE 50 MG: 50 TABLET ORAL at 00:15

## 2019-12-24 RX ADMIN — HYDROXYZINE HYDROCHLORIDE 25 MG: 25 TABLET, FILM COATED ORAL at 00:15

## 2019-12-24 RX ADMIN — ATORVASTATIN CALCIUM 20 MG: 20 TABLET, FILM COATED ORAL at 19:26

## 2019-12-24 RX ADMIN — Medication 0.5 MG: at 09:21

## 2019-12-24 RX ADMIN — FUROSEMIDE 40 MG: 40 TABLET ORAL at 09:21

## 2019-12-24 ASSESSMENT — ACTIVITIES OF DAILY LIVING (ADL)
HYGIENE/GROOMING: INDEPENDENT
DRESS: WITH ASSISTANCE
LAUNDRY: UNABLE TO COMPLETE
ORAL_HYGIENE: INDEPENDENT

## 2019-12-24 ASSESSMENT — MIFFLIN-ST. JEOR: SCORE: 813.23

## 2019-12-24 NOTE — PROGRESS NOTES
"   12/24/19 1520   Behavioral Health   Hallucinations other (see comment)  (Pt talking to herself while sleep, saying \"Supai\")   Thinking poor concentration;distractable;confused   Orientation person: oriented;place: oriented;date: oriented   Memory baseline memory   Insight poor   Judgement impaired   Eye Contact at examiner   Affect full range affect   Mood mood is calm   Physical Appearance/Attire attire appropriate to age and situation   Hygiene well groomed   Suicidality other (see comments)  (None)   1. Wish to be Dead (Recent) No   2. Non-Specific Active Suicidal Thoughts (Recent) No       Pt was observed this morning talking in her sleep. Pt kept saying \"Supai\" while she was laying in bed. Upon waking up, Pt ate her breakfast and her lunch but not in full. Pt seems to not have the best appetite, but is keeping up on her fluids. Pt was very drowsy today, currently relaxing in her room. Pt also felt today that she was having a BM a few times but upon checking nothing was in the toilet.  "

## 2019-12-24 NOTE — PROGRESS NOTES
[facility]  WO Nurse Inpatient Skin Assessment     Initial Assessment of:   coccyx      Data:   Patient History:      per MD note(s):   Schizophrenia by history, Rule out cognitive impairment due to chronic dementia-type process,  Hard of hearing.      Vadim Assessment and sub scores:   No data recorded    Positioning: Chair cushion, pulsate mattress    Mattress:  Standard , Low air loss mattress with pulsation     Moisture Management:  Incontinence Protocol    Catheter secured? Not applicable    Current Diet / Nutrition:       Orders Placed This Encounter        Regular Diet Adult        Other     Labs:   Recent Labs   Lab Test 12/07/19  0819 12/05/19  1230  05/25/18  1915  01/01/16  0945   ALBUMIN 3.4 3.8   < > 3.4   < >  --    HGB  --  13.8   < > 7.6*   < > 9.6*   INR  --   --   --  1.04  --   --    WBC  --  7.2   < > 8.1   < > 15.7*   A1C 7.5*  --   --   --    < >  --    CRP  --   --   --   --   --  109.0*    < > = values in this interval not displayed.         Skin Assessment (location):   Coccyx    Coccyx 12/24  History:  Known previous hx of as pressure ulcer, RN reports she is occasionally incontinent of urine, she is ambulatory and can change her position when prompted      Skin: intact,  With 3mm area of tan scar tissue with slightly exfoliative skin, no signs of new injury    Color: normal and consistent with surrounding tissue    Temperature  normal      Pain:  absent           Intervention:     Patient's chart evaluated.      Cares performed: sin inspeted    Orders  Written    Supplies  reviewed    Discussed plan of care with Nurse          Assessment:      At risk for re injury to area of healed pressure injury ( coccyx)        Plan:   Nursing to notify the Provider(s) and re-consult the WO Nurse if skin deteriorate(s).  Skin care plan for coccyx: Protect coccyx with Mepilex dressing, replace as needed, encourage her to repositon every 2 hours .  [facility] Nurse will sign off

## 2019-12-24 NOTE — PROGRESS NOTES
[facility], [place]   Psychiatric Progress Note        Interim History:   The patient's care was discussed with the treatment team during the daily team meeting and/or staff's chart notes were reviewed.  Staff report patient is doing better. zeusues to have bout of mild confusion and appears distracted but no major disorientation and compliant with care. Eating well. Slept 6hrs last night. No dep, anx or SI reported. Internally preoccupied at times but denied hallucinations and no paranoia noted. Attending groups and participating but have a bit more difficulty.  Needs assistnace with ADL.     The patient was pleasant. She recalls meeting with this provider but asked about my name again. She acknowledged mild confusion.no dep, anx or SI. No hallucinations or racing thoughts. She ate well. No specific delusional thoughts expressed. Tells me that she has not played the keyboard but enjoyed painting in groups.     Discussed medications and care plan.        Medications:       atorvastatin  20 mg Oral QPM     docusate sodium  100 mg Oral BID     fluPHENAZine  0.5 mg Oral BID     furosemide  40 mg Oral Daily     glipiZIDE  7.5 mg Oral QAM AC     lisinopril  5 mg Oral Daily     pramox-pe-glycerin-petrolatum   Rectal TID          Allergies:     Allergies   Allergen Reactions     Strawberry Hives     Sulfa Drugs Nausea     mouth sores       Zomig [Zolmitriptan] Other (See Comments)     depression            Labs:     Recent Results (from the past 24 hour(s))   Glucose by meter    Collection Time: 12/23/19  4:55 PM   Result Value Ref Range    Glucose 162 (H) 70 - 99 mg/dL   Basic metabolic panel    Collection Time: 12/24/19  7:47 AM   Result Value Ref Range    Sodium 140 133 - 144 mmol/L    Potassium 4.2 3.4 - 5.3 mmol/L    Chloride 107 94 - 109 mmol/L    Carbon Dioxide 26 20 - 32 mmol/L    Anion Gap 7 3 - 14 mmol/L    Glucose 218 (H) 70 - 99 mg/dL    Urea Nitrogen 30 7 - 30 mg/dL    Creatinine  1.01 0.52 - 1.04 mg/dL    GFR Estimate 48 (L) >60 mL/min/[1.73_m2]    GFR Estimate If Black 56 (L) >60 mL/min/[1.73_m2]    Calcium 8.5 8.5 - 10.1 mg/dL   Glucose by meter    Collection Time: 12/24/19  7:56 AM   Result Value Ref Range    Glucose 209 (H) 70 - 99 mg/dL          Psychiatric Examination:     Vitals:    12/22/19 1616 12/23/19 0900 12/23/19 1716 12/24/19 0900   BP: (!) 146/71  (!) 171/77    BP Location:   Left arm    Pulse: 69  74 83   Resp:  16  16   Temp: 99  F (37.2  C) 98.2  F (36.8  C) 98.4  F (36.9  C) 97.8  F (36.6  C)   TempSrc: Tympanic Tympanic Tympanic Tympanic   SpO2:  96% 96% 95%   Weight:    49.8 kg (109 lb 11.2 oz)   Height:                         Sitting Orthostatic BP: 206/104      Sitting Orthostatic Pulse: 92 bpm                     Weight is 109 lbs 11.2 oz  Body mass index is 22.16 kg/m .    Appearance: awake, alert, adequately groomed, appeared as age stated and no apparent distress   Attitude:  cooperative  Eye Contact:  good  Mood:  better  Affect:  full range and reactive  Speech:  clear, coherent and normal prosody  Psychomotor Behavior:  no evidence of tardive dyskinesia, dystonia, or tics and tremor observed   Throught Process:  more difficulty staying on task and distracted.   Associations:  no loose associations  Thought Content:  no evidence of suicidal ideation or homicidal ideation, no auditory hallucinations present and no visual hallucinations present  Insight:  fair  Judgement:  intact  Oriented to:  partial  Attention Span and Concentration:  limited  Recent and Remote Memory:  limited         Precautions:     Behavioral Orders   Procedures     Code 1 - Restrict to Unit     Code 2     Fall precautions     Routine Programming     As clinically indicated     Single Room     Skin care precautions     coccyx: Protect coccyx with Mepilex dressing, replace as needed, encourage her to repositon every 2 hours     Status 15     Every 15 minutes.     Status Individual  Observation     High risk for falls; Pt. is impulsive.     Order Specific Question:   CONTINUOUS 24 hours / day     Answer:   5 feet     Order Specific Question:   Indications for SIO     Answer:   Medical equipment / ligature risk          Diagnoses:     1.  Schizophrenia by history.   2.  Rule out cognitive impairment due to chronic dementia-type process.   3.  Hard of hearing.          Plan:     Medications:  -- Librium was discontinued and placed on Benzodiazepine withdrawal protocol with Phenobarb taper. Phenobarb taper completed uneventfully.   -- Prolixin 0.5 mg, bid continued.   -- PRN Hydroxyzine, Zyprexa, Trazodone.     Legal Status and Disposition:  -- volunt.   -- discharge to  once mood stabilization and safety in the community established.

## 2019-12-24 NOTE — PLAN OF CARE
PT order received for edema for L foot. Called over and spoke to staff regarding patient. At this time with holiday and low hospital census there is no consistent presence of trained edema therapists. At this time earliest possible time would be Monday 12/30. At this time will place on hold for Monday and call to check in. Educated to have patient work on elevating her foot as it does not appear with the lack of edema in LEs that this is true edema and question whether patient may have hurt her foot causing some increased swelling. Will check back in Monday following days of elevation.

## 2019-12-24 NOTE — PROGRESS NOTES
Internal Medicine Follow Up Note     Patient: Michaela Soria  MRN: [id number]  Admission Date: 12/6/2019  Hospital Day # 18    Assessment & Recommendations: Michaela Soria is a 89 year old woman with a history of HFpEF, implanted pacemaker, CKD, DM, HLD, HTN, bilateral breast Ca s/p bilateral mastectomies, schizophrenia, resting tremor  who is admitted to station 3B with psychiatric decompensation. Medicine seeing today for question of UTI, HFpEF and HTN.       Left LE edema  Examination today shows persistent edema that is solely in the foot with no leg edema and is 3+, dependent erythema that is blanching and resolves with elevation.  LE duplex yesterday negative.  Not clinically c/w cellulitis d/t lack of SIRS/leukocytosis/exam findings, not c/w gout d/t lack of pain. Most c/w peripheral vascular source/venous insufficiency, likely secondary to patient's prior left knee surgery.   - elevate when seated  -lymphedema therapy  - do not discourage ambulation  -please notify medicine if worsening erythema, pain or concerning symptoms      HTN  Remains asymptomatic of HTN with some sbp 160-170s  -refrain from leg bp asssessments  -continue lasix  -in consideration of options, ACEI cardioprotective effects may outweigh risk, will start lisinopril 5mg and repeat BMP    Abnormal UA  UA with +LE and 10 Wbc, no nitrites and few bacteria. Was questionable for UTI and was empirically started on keflex.  Urine cx result today 12/24 c/w normal sincere.  -discontinue keflex      HFpEF  Wt is stable and may not be completely reliable if taken with clothes and various times of day, no hypoxia   -continue I&O and daily weights  -continue daily lasix    Medicine will follow up on bp and BMP, please page with any additional concerns.     Tari Mclain PA-C  Hospitalist Service  Pager: [phone]  _________________________________________________________________    Subjective & Interval History:  Chief complaint of confusion in  "setting of schizophrenia    In am. Pt appeared to be more alert per staff and noted in afternoon to have apparent confusion, continues to follow commands and no unsafe behaviors    Last 24 hour care team notes reviewed.   ROS: 4 point ROS (including Respiratory, CV, GI and ) was performed and negative unless otherwise noted in HPI.       Medications: Reviewed in EPIC.    Physical Exam:    Blood pressure (!) 171/77, pulse 74, temperature 98.4  F (36.9  C), temperature source Tympanic, resp. rate 16, height 1.499 m (4' 11\"), weight 49.7 kg (109 lb 8 oz), SpO2 96 %, not currently breastfeeding.    Gen: elderly, pleasant, ambulates with assistance and walker  Chest: nonlabored breathing  LE: LLE 3+ pitting edema of the foot, nontender, +blanching dependent rubor of the left foot resolving with elevation, scar over left knee, 1+ pitting edema right medial malleolus, + 2+ PT and DP pulses    Labs & Studies of Note: I personally reviewed the following studies:  basic metabolic panel, UA    "

## 2019-12-24 NOTE — PROGRESS NOTES
Pt noted to be better at the start of the shift. Pt speaking in a normal tone of voice. Pt seemed to be tracking and was able to eat most of her breakfast independently. Pt did need help with fluids as patient continues to have tremors. Pt was given 240 ml of prune juice with breakfast as she has not had a documented  BM in the last since 12/22.     Pt noted to have a small reddened area  on coccyx. Order received for WOC consult. Area was cleansed and Mepiplex dressing was applied. Pt has been using foam pad when sitting in the chair and continues to have air mattress on her bed. Pt has been increasing incontinent over the last several days and has not been telling staff. Pt will need prompts to use the toilet every 2 hours.     Pt has been whispering intermittently since mid morning.     Pt noted to have continued edema in left lower extremity. Pt has been provided with a foot stool while sittting in the lounge.

## 2019-12-24 NOTE — PROGRESS NOTES
Patient up at 0011 rambling, tangential.  Repeating numbers.  Appears anxious and restless.  PRN atarax 25 mg and repeat dose of trazodone 50 mg administered at 0015.  Patient encouraged to get some sleep.  Voided X2 with assistance of 1, however patient missed collection container both times.  Pt was scanned. No retention of urine observed.  Keflex administered as scheduled, patient denies side effects.  Patient slept for 6 hrs.  No hallucinations, rambling, or disorganized thoughts observed this AM.

## 2019-12-24 NOTE — PLAN OF CARE
"Sleeping 3.25-6hrs at night.  Appetite is OK.  Endorses auditory hallucinations \"not all the time\".  Confused at times but is oriented x3 at this time.  Incontinent x1 this shift.  LLE ankle/foot remains 2-3+ edematous., RLE foot/ankle 1+.  Trazadone given for sleep per request of the patient.  "

## 2019-12-25 LAB
ANION GAP SERPL CALCULATED.3IONS-SCNC: 6 MMOL/L (ref 3–14)
BUN SERPL-MCNC: 28 MG/DL (ref 7–30)
CALCIUM SERPL-MCNC: 8.9 MG/DL (ref 8.5–10.1)
CHLORIDE SERPL-SCNC: 106 MMOL/L (ref 94–109)
CO2 SERPL-SCNC: 26 MMOL/L (ref 20–32)
CREAT SERPL-MCNC: 1.03 MG/DL (ref 0.52–1.04)
GFR SERPL CREATININE-BSD FRML MDRD: 47 ML/MIN/{1.73_M2}
GLUCOSE BLDC GLUCOMTR-MCNC: 134 MG/DL (ref 70–99)
GLUCOSE BLDC GLUCOMTR-MCNC: 182 MG/DL (ref 70–99)
GLUCOSE SERPL-MCNC: 185 MG/DL (ref 70–99)
POTASSIUM SERPL-SCNC: 4.2 MMOL/L (ref 3.4–5.3)
SODIUM SERPL-SCNC: 138 MMOL/L (ref 133–144)

## 2019-12-25 PROCEDURE — 36415 COLL VENOUS BLD VENIPUNCTURE: CPT | Performed by: PHYSICIAN ASSISTANT

## 2019-12-25 PROCEDURE — 25000132 ZZH RX MED GY IP 250 OP 250 PS 637: Mod: GY | Performed by: PHYSICIAN ASSISTANT

## 2019-12-25 PROCEDURE — 80048 BASIC METABOLIC PNL TOTAL CA: CPT | Performed by: PHYSICIAN ASSISTANT

## 2019-12-25 PROCEDURE — 00000146 ZZHCL STATISTIC GLUCOSE BY METER IP

## 2019-12-25 PROCEDURE — 25000132 ZZH RX MED GY IP 250 OP 250 PS 637: Mod: GY | Performed by: NURSE PRACTITIONER

## 2019-12-25 PROCEDURE — 25000132 ZZH RX MED GY IP 250 OP 250 PS 637: Mod: GY | Performed by: PSYCHIATRY & NEUROLOGY

## 2019-12-25 PROCEDURE — 12400002 ZZH R&B MH SENIOR/ADOLESCENT

## 2019-12-25 RX ORDER — GLIPIZIDE 10 MG/1
10 TABLET ORAL
Status: DISCONTINUED | OUTPATIENT
Start: 2019-12-26 | End: 2019-12-25

## 2019-12-25 RX ORDER — GLIPIZIDE 10 MG/1
10 TABLET ORAL
Status: DISCONTINUED | OUTPATIENT
Start: 2019-12-25 | End: 2019-12-28

## 2019-12-25 RX ADMIN — GLYCERIN, PETROLATUM, PHENYLEPHRINE HCL, PRAMOXINE HCL: 144; 2.5; 10; 15 CREAM TOPICAL at 08:11

## 2019-12-25 RX ADMIN — GLYCERIN, PETROLATUM, PHENYLEPHRINE HCL, PRAMOXINE HCL: 144; 2.5; 10; 15 CREAM TOPICAL at 13:13

## 2019-12-25 RX ADMIN — FUROSEMIDE 40 MG: 40 TABLET ORAL at 08:52

## 2019-12-25 RX ADMIN — LISINOPRIL 5 MG: 5 TABLET ORAL at 08:51

## 2019-12-25 RX ADMIN — DOCUSATE SODIUM 100 MG: 100 CAPSULE, LIQUID FILLED ORAL at 20:54

## 2019-12-25 RX ADMIN — Medication 0.5 MG: at 08:52

## 2019-12-25 RX ADMIN — DOCUSATE SODIUM 100 MG: 100 CAPSULE, LIQUID FILLED ORAL at 08:51

## 2019-12-25 RX ADMIN — GLIPIZIDE 10 MG: 10 TABLET ORAL at 13:10

## 2019-12-25 RX ADMIN — GLYCERIN, PETROLATUM, PHENYLEPHRINE HCL, PRAMOXINE HCL: 144; 2.5; 10; 15 CREAM TOPICAL at 20:54

## 2019-12-25 RX ADMIN — ATORVASTATIN CALCIUM 20 MG: 20 TABLET, FILM COATED ORAL at 20:54

## 2019-12-25 RX ADMIN — Medication 0.5 MG: at 20:54

## 2019-12-25 ASSESSMENT — ACTIVITIES OF DAILY LIVING (ADL)
DRESS: WITH ASSISTANCE
HYGIENE/GROOMING: WITH ASSISTANCE
LAUNDRY: UNABLE TO COMPLETE
ORAL_HYGIENE: INDEPENDENT

## 2019-12-25 ASSESSMENT — MIFFLIN-ST. JEOR: SCORE: 821.19

## 2019-12-25 NOTE — PROGRESS NOTES
Pt had a big BM this morning at 10 am after attending community meeting. The BM was large and soft.

## 2019-12-25 NOTE — PROGRESS NOTES
"Pt appears to have slept 5 hours.  Pt had 120mL fluid intake this shift.  Pt was incontinent of large amt of urine in brief; pt also voided in toilet however missed collection hat, therefore unable to measure however voided clear yellow urine--appeared large amt in toilet.   Pt frequently referenced an \"embarrassing moment.\"  Pt mentioned her daughter several times, at times talking about her daughter and school, daughter and choir and her daughter's embarrassing moments.  SIO 1:1 remains in place to ensure safety.  Will continue to monitor and support plan of care.    "

## 2019-12-25 NOTE — PROGRESS NOTES
Internal Medicine Follow Up Note     Patient: Michaela Soria  MRN: [id number]  Admission Date: 12/6/2019  Hospital Day # 19    Assessment & Recommendations: Michaela Soria is a 89 year old woman with a history of HFpEF, implanted pacemaker, CKD, DM, HLD, HTN, bilateral breast Ca s/p bilateral mastectomies, schizophrenia, resting tremor  who is admitted to station 3B with psychiatric decompensation. Medicine seeing today for question of UTI, HFpEF and HTN.       Left LE edema  Examination today shows persistent edema that is solely in the foot with no leg edema and is 3+, dependent erythema that is blanching and resolves with elevation.  LE duplex yesterday negative.  Not clinically c/w cellulitis d/t lack of SIRS/leukocytosis/exam findings, not c/w gout d/t lack of pain. Most c/w peripheral vascular source/venous insufficiency, likely secondary to patient's prior left knee surgery.   - elevate legs when seated  -lymphedema therapy  - do not discourage ambulation  -please notify medicine if worsening erythema, pain or concerning symptoms      DM II  Hyperglycemia  BG remain elevated 160-210 on glucatrol 7.5mg BID.    -metformin continues to be contraindicated based on GFR, will repeat BMP in a.m.  -increased glucotrol to 10mg   -hyperglycemia protocol ordered  -hold off on sliding scale at this time    HTN  Remains asymptomatic of HTN overnight bp 150/68, pulse 70s-80s  -refrain from leg bp asssessments  -continue lasix  -in consideration of options, ACEI cardioprotective effects may outweigh risk, will start lisinopril 5mg and repeat BMP    Abnormal UA  Incomplete bladder emptying  UA with +LE and 10 Wbc, no nitrites and few bacteria. Was questionable for UTI and was empirically started on keflex.  Urine cx result today 12/24 c/w normal sincere.  -discontinue keflex  -encourage voids q3h while awake      HFpEF  Wt is stable and may not be completely reliable if taken with clothes and various times of day, no hypoxia.   "Wt appears to be remaining stable 12/19-12/24 at ~49.7kg.  -continue I&O and daily weights  -continue daily lasix    Medicine will follow up on bp and BMP, please page with any additional concerns.     Tari Mclain PA-C  Hospitalist Service  Pager: [phone]  _________________________________________________________________    Subjective & Interval History:  Chief complaint of confusion in setting of schizophrenia        Medications: Reviewed in EPIC.    Physical Exam:    Blood pressure (!) 150/68, pulse 86, temperature 98.7  F (37.1  C), temperature source Tympanic, resp. rate 20, height 1.499 m (4' 11\"), weight 49.8 kg (109 lb 11.2 oz), SpO2 95 %, not currently breastfeeding.    Labs & Studies of Note: I personally reviewed the following studies:  basic metabolic panel, UA    "

## 2019-12-25 NOTE — PROGRESS NOTES
"Pt present in milieu this evening. Pt is social with peers that approach and staff. Pt says she feels angry today because she does not want her daughter to control her.  Pt feels that she currently has no voice.  Pt expressed anxieties about future living situation. Writer reminded pt that family is finding her a place to live, pt accepted but said she wants to make sure where she goes is \"earnest.\" Pt seems nervous about finding a new place but says she just wants a nice place and be able to paint. Pt went on to say she wants to work on her financial situation and be able to afford her own paints.      Pt seems slightly disoriented today. When asked about the date pt stated her birth date and year. Writer asked again and pt looked at whiteboard on unit (which contains date, schedule, and messages) and said \"messages.\" Writer wrote date for pt and pt remembered after that. For place, pt said \"[facility].\" later in the evening pt stated that we are at \"[facility].\"    Pt endorsed hearing \"music\" when asked about hallucinations. When writer asked again pt said \"maybe.\"    Pt is holding her hands up much more this evening than in previous days. Pt continues to mimic staff and those talking to here on occasion. Pt also speaks inaudibly sometimes.      12/24/19 1900   Behavioral Health   Hallucinations auditory;appears responding  (\"music\")   Thinking confused   Orientation person: oriented;place: oriented;date, disoriented;time: oriented   Memory new learning, recall loss   Insight insight appropriate to situation   Judgement impaired   Eye Contact at examiner   Affect full range affect   Mood mood is calm;anxious   Physical Appearance/Attire attire appropriate to age and situation   Hygiene well groomed   Suicidality other (see comments)  (none)   1. Wish to be Dead (Recent) No   2. Non-Specific Active Suicidal Thoughts (Recent) No   Self Injury   (none)   Elopement   (none)   Activity   (present in " milieu social)   Speech pressured   Medication Sensitivity no stated side effects;no observed side effects   Psychomotor / Gait balanced;steady   Psycho Education   Type of Intervention 1:1 intervention   Response participates with encouragement   Hours 0.5   Treatment Detail Check in   Activities of Daily Living   Hygiene/Grooming independent   Oral Hygiene independent   Dress with assistance   Laundry unable to complete   Room Organization unable   Activity   Activity Assistance Provided assistance, 1 person   Assistive Device Utilized gait belt;walker

## 2019-12-25 NOTE — PLAN OF CARE
"48 Hour RN Note:   Sharri is having a good day. Rates depression mild. Anxiety moderate. When asked what makes her anxious, she states, \"I worked hard my whole life, I know what's going on, I understand things. I want my freedom.\"  She does endorse auditory hallucinations \"voices, sometimes.\" When asked what they say, she said \"positive things, to keep trying.\" No command hallucinations or negative content.    Excellent appetite at breakfast and lunch. Medication compliant. Participated in groups. Had family visitors, which she enjoyed very much. They brought her a new calendar and other small gifts.    Calm and social with staff and peers, but tangential with poor concentration. Talking a lot about her past== living in [facility], her  being in the , her daughter, and \"Dr. Phipps.\"   No concerns about care here; says it is very good.    Continues on 1:1 for safety risk, unstable gait, assist of 1 for cares.      "

## 2019-12-25 NOTE — PROGRESS NOTES
Received call from Jill at [facility] ([phone]) who is working on the level II screening for pt.  She reports that she will not be in the office until Friday so she will be unable to work on the screening.  Will follow up with her Friday.

## 2019-12-26 LAB
ANION GAP SERPL CALCULATED.3IONS-SCNC: 4 MMOL/L (ref 3–14)
BUN SERPL-MCNC: 30 MG/DL (ref 7–30)
CALCIUM SERPL-MCNC: 8.9 MG/DL (ref 8.5–10.1)
CHLORIDE SERPL-SCNC: 107 MMOL/L (ref 94–109)
CO2 SERPL-SCNC: 28 MMOL/L (ref 20–32)
CREAT SERPL-MCNC: 1.09 MG/DL (ref 0.52–1.04)
GFR SERPL CREATININE-BSD FRML MDRD: 44 ML/MIN/{1.73_M2}
GLUCOSE BLDC GLUCOMTR-MCNC: 151 MG/DL (ref 70–99)
GLUCOSE SERPL-MCNC: 185 MG/DL (ref 70–99)
POTASSIUM SERPL-SCNC: 4.3 MMOL/L (ref 3.4–5.3)
SODIUM SERPL-SCNC: 139 MMOL/L (ref 133–144)

## 2019-12-26 PROCEDURE — 80048 BASIC METABOLIC PNL TOTAL CA: CPT | Performed by: PHYSICIAN ASSISTANT

## 2019-12-26 PROCEDURE — 00000146 ZZHCL STATISTIC GLUCOSE BY METER IP

## 2019-12-26 PROCEDURE — 99207 ZZC CDG-MDM COMPONENT: MEETS MODERATE - UP CODED: CPT | Performed by: PHYSICIAN ASSISTANT

## 2019-12-26 PROCEDURE — 25000132 ZZH RX MED GY IP 250 OP 250 PS 637: Mod: GY | Performed by: NURSE PRACTITIONER

## 2019-12-26 PROCEDURE — 12400002 ZZH R&B MH SENIOR/ADOLESCENT

## 2019-12-26 PROCEDURE — 25000132 ZZH RX MED GY IP 250 OP 250 PS 637: Mod: GY | Performed by: PSYCHIATRY & NEUROLOGY

## 2019-12-26 PROCEDURE — 99232 SBSQ HOSP IP/OBS MODERATE 35: CPT | Performed by: PSYCHIATRY & NEUROLOGY

## 2019-12-26 PROCEDURE — 25000132 ZZH RX MED GY IP 250 OP 250 PS 637: Mod: GY | Performed by: PHYSICIAN ASSISTANT

## 2019-12-26 PROCEDURE — 36415 COLL VENOUS BLD VENIPUNCTURE: CPT | Performed by: PHYSICIAN ASSISTANT

## 2019-12-26 PROCEDURE — 82947 ASSAY GLUCOSE BLOOD QUANT: CPT | Performed by: PHYSICIAN ASSISTANT

## 2019-12-26 PROCEDURE — 99232 SBSQ HOSP IP/OBS MODERATE 35: CPT | Performed by: PHYSICIAN ASSISTANT

## 2019-12-26 RX ORDER — LISINOPRIL 10 MG/1
10 TABLET ORAL DAILY
Status: DISCONTINUED | OUTPATIENT
Start: 2019-12-27 | End: 2019-12-28

## 2019-12-26 RX ORDER — LISINOPRIL 2.5 MG/1
2.5 TABLET ORAL ONCE
Status: COMPLETED | OUTPATIENT
Start: 2019-12-26 | End: 2019-12-26

## 2019-12-26 RX ADMIN — LISINOPRIL 5 MG: 5 TABLET ORAL at 10:17

## 2019-12-26 RX ADMIN — DOCUSATE SODIUM 100 MG: 100 CAPSULE, LIQUID FILLED ORAL at 09:24

## 2019-12-26 RX ADMIN — LISINOPRIL 2.5 MG: 2.5 TABLET ORAL at 13:17

## 2019-12-26 RX ADMIN — GLYCERIN, PETROLATUM, PHENYLEPHRINE HCL, PRAMOXINE HCL: 144; 2.5; 10; 15 CREAM TOPICAL at 10:19

## 2019-12-26 RX ADMIN — GLIPIZIDE 10 MG: 10 TABLET ORAL at 09:24

## 2019-12-26 RX ADMIN — TRAZODONE HYDROCHLORIDE 50 MG: 50 TABLET ORAL at 19:59

## 2019-12-26 RX ADMIN — Medication 0.5 MG: at 19:58

## 2019-12-26 RX ADMIN — TRAZODONE HYDROCHLORIDE 50 MG: 50 TABLET ORAL at 01:29

## 2019-12-26 RX ADMIN — GLYCERIN, PETROLATUM, PHENYLEPHRINE HCL, PRAMOXINE HCL: 144; 2.5; 10; 15 CREAM TOPICAL at 13:17

## 2019-12-26 RX ADMIN — FUROSEMIDE 40 MG: 40 TABLET ORAL at 10:16

## 2019-12-26 RX ADMIN — GLYCERIN, PETROLATUM, PHENYLEPHRINE HCL, PRAMOXINE HCL: 144; 2.5; 10; 15 CREAM TOPICAL at 19:58

## 2019-12-26 RX ADMIN — HYDROXYZINE HYDROCHLORIDE 25 MG: 25 TABLET, FILM COATED ORAL at 01:29

## 2019-12-26 RX ADMIN — Medication 0.5 MG: at 09:25

## 2019-12-26 RX ADMIN — HYDROXYZINE HYDROCHLORIDE 25 MG: 25 TABLET, FILM COATED ORAL at 19:59

## 2019-12-26 RX ADMIN — ATORVASTATIN CALCIUM 20 MG: 20 TABLET, FILM COATED ORAL at 19:58

## 2019-12-26 RX ADMIN — DOCUSATE SODIUM 100 MG: 100 CAPSULE, LIQUID FILLED ORAL at 19:58

## 2019-12-26 ASSESSMENT — ACTIVITIES OF DAILY LIVING (ADL)
ORAL_HYGIENE: WITH ASSISTANCE;PROMPTS
DRESS: WITH ASSISTANCE
LAUNDRY: UNABLE TO COMPLETE
HYGIENE/GROOMING: WITH ASSISTANCE

## 2019-12-26 ASSESSMENT — MIFFLIN-ST. JEOR: SCORE: 804.86

## 2019-12-26 NOTE — PROGRESS NOTES
[facility], [place]   Psychiatric Progress Note        Interim History:   The patient's care was discussed with the treatment team during the daily team meeting and/or staff's chart notes were reviewed.  Staff report patient is doing better. Had some difficulty falling sleep but slept 7.5 hrs with PRN Vistaril and Trazodone. Forgetful and mild confusion. No dep, anx or SI. No hallucinations or sugar reported. Compliant with medications and care. Independent with ADL.    The patient noted that she is feeling better today. She slept better last night, but appetite is limited this afternoon. She denied dep, anx and SI. She recalls meeting with this provider in the past but not fully oriented. No hallucinations or paranoia. Tolerating medications well. Related feeling tired bu no other physical complaints.     Discussed medications and care plan.        Medications:       atorvastatin  20 mg Oral QPM     docusate sodium  100 mg Oral BID     fluPHENAZine  0.5 mg Oral BID     furosemide  40 mg Oral Daily     glipiZIDE  10 mg Oral QAM AC     [START ON 12/27/2019] lisinopril  10 mg Oral Daily     pramox-pe-glycerin-petrolatum   Rectal TID          Allergies:     Allergies   Allergen Reactions     Strawberry Hives     Sulfa Drugs Nausea     mouth sores       Zomig [Zolmitriptan] Other (See Comments)     depression            Labs:     Recent Results (from the past 24 hour(s))   Glucose by meter    Collection Time: 12/25/19  4:58 PM   Result Value Ref Range    Glucose 134 (H) 70 - 99 mg/dL   Basic metabolic panel    Collection Time: 12/26/19  6:38 AM   Result Value Ref Range    Sodium 139 133 - 144 mmol/L    Potassium 4.3 3.4 - 5.3 mmol/L    Chloride 107 94 - 109 mmol/L    Carbon Dioxide 28 20 - 32 mmol/L    Anion Gap 4 3 - 14 mmol/L    Glucose 185 (H) 70 - 99 mg/dL    Urea Nitrogen 30 7 - 30 mg/dL    Creatinine 1.09 (H) 0.52 - 1.04 mg/dL    GFR Estimate 44 (L) >60 mL/min/[1.73_m2]    GFR  Estimate If Black 51 (L) >60 mL/min/[1.73_m2]    Calcium 8.9 8.5 - 10.1 mg/dL          Psychiatric Examination:     Vitals:    12/25/19 2022 12/25/19 2045 12/26/19 0113 12/26/19 0900   BP:  (!) 159/65 139/60 (!) 163/68   BP Location:  Right arm Right arm    Pulse: 94 84 80 96   Resp: 16  16 16   Temp: 98.3  F (36.8  C)  98.6  F (37  C) 98.5  F (36.9  C)   TempSrc: Tympanic  Tympanic Oral   SpO2: 98%      Weight:    48.9 kg (107 lb 12.8 oz)   Height:                         Sitting Orthostatic BP: 206/104      Sitting Orthostatic Pulse: 92 bpm                     Weight is 107 lbs 12.8 oz  Body mass index is 21.76 kg/m .    Appearance: awake, alert, adequately groomed, appeared as age stated and no apparent distress   Attitude:  cooperative  Eye Contact:  good  Mood:  better  Affect:  full range and reactive  Speech:  clear, coherent and normal prosody and soft.  Psychomotor Behavior:  no evidence of tardive dyskinesia, dystonia, or tics and tremor observed   Throught Process:  linear  Associations:  no loose associations  Thought Content:  no evidence of suicidal ideation or homicidal ideation, no auditory hallucinations present and no visual hallucinations present  Insight:  fair  Judgement:  intact  Oriented to:  partial  Attention Span and Concentration:  limited  Recent and Remote Memory:  limited         Precautions:     Behavioral Orders   Procedures     Code 1 - Restrict to Unit     Code 2     Fall precautions     Routine Programming     As clinically indicated     Single Room     Skin care precautions     coccyx: Protect coccyx with Mepilex dressing, replace as needed, encourage her to repositon every 2 hours     Status 15     Every 15 minutes.     Status Individual Observation     High risk for falls; Pt. is impulsive.     Order Specific Question:   CONTINUOUS 24 hours / day     Answer:   5 feet     Order Specific Question:   Indications for SIO     Answer:   Medical equipment / ligature risk           Diagnoses:     1.  Schizophrenia by history.   2.  Rule out cognitive impairment due to chronic dementia-type process.   3.  Hard of hearing.          Plan:     Medications:  -- Librium was discontinued and placed on Benzodiazepine withdrawal protocol with Phenobarb taper. Phenobarb taper completed uneventfully.   -- Prolixin 0.5 mg, bid continued.   -- PRN Hydroxyzine, Zyprexa, Trazodone.     Legal Status and Disposition:  -- volunt.   -- discharge to  once mood stabilization and safety in the community established.

## 2019-12-26 NOTE — PROGRESS NOTES
"   12/25/19 2000   Behavioral Health   Hallucinations denies / not responding to hallucinations   Thinking delusional;confused;poor concentration   Orientation date, disoriented;time, disoriented;person: oriented;place: oriented   Memory new learning, recall loss   Insight insight appropriate to situation   Judgement impaired   Eye Contact at examiner   Affect full range affect   Mood mood is calm   Physical Appearance/Attire attire appropriate to age and situation   Hygiene well groomed   Suicidality other (see comments)  (denies)   1. Wish to be Dead (Recent) No   2. Non-Specific Active Suicidal Thoughts (Recent) No   Self Injury other (see comment)  (none observed)   Elopement   (none observed)   Activity other (see comment)  (active in milieu)   Speech pressured;rambling;clear   Medication Sensitivity no stated side effects   Psychomotor / Gait tremors;unsteady   Activities of Daily Living   Hygiene/Grooming with assistance   Oral Hygiene independent   Dress with assistance   Laundry unable to complete   Room Organization unable     Pt was present and active in the milieu. Pt attended group programming offered on the unit this evening. Pt socialized with peers and colored in the lounge.  Pt states \"These trees I'm coloring are my delusions.\" Pt denies SI/SIB/HI.  "

## 2019-12-26 NOTE — PROGRESS NOTES
Patient was having trouble sleeping. She said she felt like throwing up. Skin was warm to touch. Went to the bathroom and voided small amount of urine. No cough and other physical symptoms noted. BP-139/60, P-80, R-16 and T-37.     Patient continued to be restless and awake. Kept on talking to the staff doing the 1:1. Hydroxyzine 25 mg. and Trazodone 50 mg.given  PRN anxiety and  sleep. Slept a total of 7.25 hours.

## 2019-12-26 NOTE — PROGRESS NOTES
"CLINICAL NUTRITION SERVICES - REASSESSMENT NOTE     Nutrition Prescription    RECOMMENDATIONS FOR MDs/PROVIDERS TO ORDER:  Encourage PO intake     Malnutrition Status:    Patient does not meet two of the established criteria necessary for diagnosing malnutrition     Recommendations already ordered by Registered Dietitian (RD):  Order chocolate Boost with TID meals and PRN      Future/Additional Recommendations:  Continue to monitor PO intake and wt trends  Adjust supplement regimen as desired by the pt     EVALUATION OF THE PROGRESS TOWARD GOALS   Diet: Regular, Preferences: chocolate pudding with whipped cream, chocolate milk shakes, fruit with meals (any kind), cottage cheese with peaches, green beans, mashed potatoes, chicken/meats, hamburger (with pickles and mustard), grilled cheese sandwiches, coke, butter.      Dislikes: sauerkraut, hotdogs, Mexican food, tortillas.  Chocolate with breakfast (staff may add ice cream on unit for more of a \"milkshake\" per pt preference)   AND between meals if requested.  Intake: Pt was unusually quite and did not answer any questions asked by the RD today. Staff was made aware. Staff reports that the pt has been drinking the Boost but has been consuming <50% of her TID trays with the assistance of staff physically feeding her.       NEW FINDINGS   Over the last week the pt has lost 4# (4%). Since admission on 12/8, she has gained 1#.   Wt Readings from Last 3 Encounters:   12/26/19 48.9 kg (107 lb 12.8 oz)   12/19/19 50.8 kg (111 lb 14.4 oz)   12/08/19 48.4 kg (106 lb 12.8 oz)     MALNUTRITION  % Intake: < 75% for > 7 days (non-severe)  % Weight Loss: > 2% in 1 week (severe)  Subcutaneous Fat Loss: None observed  Muscle Loss: None observed  Fluid Accumulation/Edema: None noted  Malnutrition Diagnosis: Severe malnutrition in the context of acute on chronic illness.     Previous Goals   Patient to consume % of nutritionally adequate meal trays TID, or the equivalent with " supplements/snacks.  Evaluation: Not met  Weight stability vs gain (>50 kg).   Evaluation: Not met    Previous Nutrition Diagnosis  Predicted inadequate nutrient intake (calories/protein) related to pt's current PO currently meeting 100% of nutrition needs as evidenced by potential for decline with previous wt loss  Evaluation: Declining    CURRENT NUTRITION DIAGNOSIS  Inadequate protein-energy intake related to altered mental status as evidenced by the pt consuming <50% of her TID trays and a 4# (4%) wt loss over last week.     INTERVENTIONS  Implementation  - Medical food supplement therapy - Chocolate Boost with TID meals and PRN   - Encouraged pt to eat >50% of TID meals and TID Boost.     Goals  - Patient to consume % of nutritionally adequate meal trays TID, or the equivalent with supplements/snacks.  - Weight stability vs gain (>50 kg).     Monitoring/Evaluation  Progress toward goals will be monitored and evaluated per protocol.      Felicitas Tate RD, LD  Pager: [phone]

## 2019-12-26 NOTE — PROGRESS NOTES
12/25/19 2100   Art Therapy   Type of Intervention structured groups   Response Encouragement   Hours 1   Treatment Detail    Art Therapy-  colin in recovery trees   Goal- cope, express, regulate and reflect through Art Therapy directives.     Outcome- Pt was engaged in the art. She talked about her joys being art and shared she liked to draw faces. She talked about enjoying cookies that a male peer's wife brought to the unit. She communicated well with  Staff writing cues. Her hand was very shaky but she still was able to make a tree . She enjoys the social aspect of group.

## 2019-12-26 NOTE — PROGRESS NOTES
Internal Medicine Follow Up Note     Patient: Michaela Soria  MRN: [id number]  Admission Date: 12/6/2019  Hospital Day # 21    Assessment & Recommendations: Michaela Soria is a 89 year old woman with a history of HFpEF, implanted pacemaker, CKD, DM, HLD, HTN, bilateral breast Ca s/p bilateral mastectomies, schizophrenia, resting tremor  who is admitted to station 3[facility] with psychiatric decompensation.       Assessment of nutrition status  Appreciate RD input  -per RD recs continue Boost TID with meals    Left LE edema  Examination today shows persistent edema that is solely in the foot with no leg edema and is 3+, dependent erythema that is blanching and resolves with elevation.  LE duplex yesterday negative.  Not clinically c/w cellulitis d/t lack of SIRS/leukocytosis/exam findings, not c/w gout d/t lack of pain. Most c/w peripheral vascular source/venous insufficiency, likely secondary to patient's prior left knee surgery.  Exam shows near resolution of edema on 12/27  -no lymphedema therapist available until 12/30--if remains inpatient and edema is not resolved, continue with consult    -please notify medicine if worsening erythema, pain or concerning symptoms      DM II  Hyperglycemia  BG remain elevated 160-210 on glucatrol 7.5mg BID.    -metformin continues to be contraindicated based on GFR, will repeat BMP in a.m.  -increased glucotrol to 10mg   -hyperglycemia protocol ordered  -hold off on sliding scale at this time    HTN  Remains asymptomatic of HTN overnight bp 150/68, pulse 70s-80s. Persistant after initiating lisinopril and up-titrating dose, 7.5mg yesterday.  -lisinopril 10mg today, consider increasing  -refrain from leg bp asssessments  -continue lasix    Abnormal UA  Incomplete bladder emptying  UA with +LE and 10 Wbc, no nitrites and few bacteria. Was questionable for UTI and was empirically started on keflex.  Urine cx result today 12/24 c/w normal sincere.  -discontinue keflex  -encourage voids q3h  "while awake      HFpEF  Wt is stable and may not be completely reliable if taken with clothes and various times of day, no hypoxia.  Wt appears to be remaining stable 12/19-12/24 at ~49.7kg. 12/25 50.5kg and 12/26 48.8kg, no general trend of increased or decreased weights.   -continue I&O and daily weights  -continue daily lasix    Medicine will follow up on bp and BMP, please page with any additional concerns.     Tari Mclain PA-C  Hospitalist Service  Pager: [phone]  _________________________________________________________________    Subjective & Interval History:  Chief complaint of confusion in setting of schizophrenia        Medications: Reviewed in EPIC.    Physical Exam:    Blood pressure (!) 150/79, pulse 78, temperature 98.7  F (37.1  C), temperature source Tympanic, resp. rate 16, height 1.499 m (4' 11.02\"), weight 48.9 kg (107 lb 12.8 oz), SpO2 98 %, not currently breastfeeding.   Gen: A&O and distracted, appropriate responses, tremulous, sitting at table eating breakfast  Chest: nonlabored breathing  LE: left foot with dependent blanching erythema of the large toe, no tenderness, 1+ edema, no fluctuance or open lesions, no ankle/leg edema, + bilateral 2+ pulses    Labs & Studies of Note: I personally reviewed the following studies:  basic metabolic panel, UA    "

## 2019-12-27 LAB
GLUCOSE BLDC GLUCOMTR-MCNC: 156 MG/DL (ref 70–99)
GLUCOSE BLDC GLUCOMTR-MCNC: 181 MG/DL (ref 70–99)

## 2019-12-27 PROCEDURE — 25000132 ZZH RX MED GY IP 250 OP 250 PS 637: Mod: GY | Performed by: PHYSICIAN ASSISTANT

## 2019-12-27 PROCEDURE — 25000132 ZZH RX MED GY IP 250 OP 250 PS 637: Mod: GY | Performed by: NURSE PRACTITIONER

## 2019-12-27 PROCEDURE — 00000146 ZZHCL STATISTIC GLUCOSE BY METER IP

## 2019-12-27 PROCEDURE — G0177 OPPS/PHP; TRAIN & EDUC SERV: HCPCS

## 2019-12-27 PROCEDURE — 25000132 ZZH RX MED GY IP 250 OP 250 PS 637: Mod: GY | Performed by: PSYCHIATRY & NEUROLOGY

## 2019-12-27 PROCEDURE — 12400002 ZZH R&B MH SENIOR/ADOLESCENT

## 2019-12-27 RX ADMIN — LISINOPRIL 10 MG: 10 TABLET ORAL at 09:31

## 2019-12-27 RX ADMIN — Medication 0.5 MG: at 09:31

## 2019-12-27 RX ADMIN — Medication 0.5 MG: at 19:41

## 2019-12-27 RX ADMIN — DOCUSATE SODIUM 100 MG: 100 CAPSULE, LIQUID FILLED ORAL at 09:31

## 2019-12-27 RX ADMIN — GLYCERIN, PETROLATUM, PHENYLEPHRINE HCL, PRAMOXINE HCL: 144; 2.5; 10; 15 CREAM TOPICAL at 19:42

## 2019-12-27 RX ADMIN — HYDROXYZINE HYDROCHLORIDE 25 MG: 25 TABLET, FILM COATED ORAL at 19:41

## 2019-12-27 RX ADMIN — ATORVASTATIN CALCIUM 20 MG: 20 TABLET, FILM COATED ORAL at 19:41

## 2019-12-27 RX ADMIN — GLYCERIN, PETROLATUM, PHENYLEPHRINE HCL, PRAMOXINE HCL: 144; 2.5; 10; 15 CREAM TOPICAL at 11:30

## 2019-12-27 RX ADMIN — FUROSEMIDE 40 MG: 40 TABLET ORAL at 09:30

## 2019-12-27 RX ADMIN — HYDROXYZINE HYDROCHLORIDE 25 MG: 25 TABLET, FILM COATED ORAL at 10:48

## 2019-12-27 RX ADMIN — GLIPIZIDE 10 MG: 10 TABLET ORAL at 09:31

## 2019-12-27 RX ADMIN — DOCUSATE SODIUM 100 MG: 100 CAPSULE, LIQUID FILLED ORAL at 19:41

## 2019-12-27 ASSESSMENT — ACTIVITIES OF DAILY LIVING (ADL)
ORAL_HYGIENE: WITH ASSISTANCE
ORAL_HYGIENE: WITH ASSISTANCE
HYGIENE/GROOMING: WITH ASSISTANCE
DRESS: WITH ASSISTANCE
DRESS: WITH ASSISTANCE
LAUNDRY: UNABLE TO COMPLETE
HYGIENE/GROOMING: WITH ASSISTANCE

## 2019-12-27 ASSESSMENT — MIFFLIN-ST. JEOR: SCORE: 809.39

## 2019-12-27 NOTE — PROGRESS NOTES
"Pt was mute for most of AM cares. Pt making hand gestures. Pt cooperative with toileting and dressing. Pt ambulated with walker, transfer belt and SBA to [facility] [place], [place]. Pt incontinent of urine this AM. Pt began urinating as soon as patients pants were being pulled down and before she sat on the toilet.     Writer offered patient prn vistaril 25 mg at lunch time to decrease anxiety.     Pt continued to be mute until approximately 1445- Pt talking in a normal tone. Pt did complain briefly of \"sharp stomach pains\". Pt then began talking about family members.    Pt has needed assistance with eating due to tremors. Pt solid intake has been marginal. Pt has taken approximately 1120 ml of fluids (including prune juice, and Boost).    "

## 2019-12-27 NOTE — PROGRESS NOTES
"   12/27/19 1028   Significant Event   Significant Event   (shift summary)   Pt ate breakfast with much physical assistance and assistance of a typing . She was completely mute in response to questions. At the end of the shift she was fully verbal, talking about her family, and singing a song about the [place] and [place]. Pt pleasant and cooperative and appears to be attending to internal stimulation, as evidence by grabbing at the table for nothing, and answering questions (during verbal time)  that were not being asked. For instance, without impetus she stated \"Yes I do, her name is Tessa.\".   "

## 2019-12-27 NOTE — PLAN OF CARE
Problem: Adult Behavioral Health Plan of Care  Goal: Team Discussion  Description  Team Plan:  Outcome: Improving  Note:   BEHAVIORAL TEAM DISCUSSION    Participants: Dr. Pro, Augustina Mclain RN, Mariam Peters, Health system  Progress: Improving  Anticipated length of stay: 3-5 days  Continued Stay Criteria/Rationale: Pt is waiting for placement to SNF  Medical/Physical: Left LE Edema, HTN, HLD, Abnormal UA  Precautions:   Behavioral Orders   Procedures    Code 1 - Restrict to Unit    Code 2    Fall precautions    Routine Programming     As clinically indicated    Single Room    Skin care precautions     coccyx: Protect coccyx with Mepilex dressing, replace as needed, encourage her to repositon every 2 hours    Status 15     Every 15 minutes.    Status Individual Observation     High risk for falls; Pt. is impulsive.     Order Specific Question:   CONTINUOUS 24 hours / day     Answer:   5 feet     Order Specific Question:   Indications for SIO     Answer:   Medical equipment / ligature risk     Plan: Pt will discharge to [facility] when her Level II screening has been completed, likely next week.  Rationale for change in precautions or plan:N/A

## 2019-12-27 NOTE — PROGRESS NOTES
"Fluids encouraged, intake 780 mL. Ate 25 % of her meal, drank 100% of boost. Gait unsteady, pt need prompting and assistance with her ADLs. Mepilex on pt's coccyx is CDI. Pt appeared responding, grabbing in the air, pt was mimicking others postures, pt mute most of shift, pt verbalized she hears voices \"sometimes\". PRN trazodone given for sleep, PRN hydroxyzine given for anxiety. Pt continues on SIO for high fall risk and impulsivity.   "

## 2019-12-27 NOTE — PLAN OF CARE
Problem: OT General Care Plan  Goal: OT Goal 1  Description  Will attend OT groups and participate actively in all OT opportunities. Will assess and set goals.     Note:   Attended the AM OT group. She needed assistance, cues and guidance in sitting down onto the chair and as she was leaning over the table, needed hands put on the chair behind her to begin the action of sitting down. 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 On several occasions, she motioned in the air with her hands and without addressing anyone in particular for this purpose. She whispered to this author on 2 occasions on approach while giving direct eye contact. She seemed to mouth a comment of 2 sentences to this author about a topic we had discussed in the past on several occasions. She appears to have more difficulty focusing attention to any concrete and purposeful action. Seems confused.

## 2019-12-27 NOTE — PROGRESS NOTES
Left voice message for Jill at [facility] ([phone]) to determine the status of Level II [place] screening.

## 2019-12-28 LAB
ANION GAP SERPL CALCULATED.3IONS-SCNC: 5 MMOL/L (ref 3–14)
BUN SERPL-MCNC: 30 MG/DL (ref 7–30)
CALCIUM SERPL-MCNC: 8.9 MG/DL (ref 8.5–10.1)
CHLORIDE SERPL-SCNC: 106 MMOL/L (ref 94–109)
CO2 SERPL-SCNC: 28 MMOL/L (ref 20–32)
CREAT SERPL-MCNC: 1.04 MG/DL (ref 0.52–1.04)
GFR SERPL CREATININE-BSD FRML MDRD: 46 ML/MIN/{1.73_M2}
GLUCOSE BLDC GLUCOMTR-MCNC: 195 MG/DL (ref 70–99)
GLUCOSE BLDC GLUCOMTR-MCNC: 308 MG/DL (ref 70–99)
GLUCOSE BLDC GLUCOMTR-MCNC: 345 MG/DL (ref 70–99)
GLUCOSE BLDC GLUCOMTR-MCNC: 440 MG/DL (ref 70–99)
GLUCOSE SERPL-MCNC: 215 MG/DL (ref 70–99)
POTASSIUM SERPL-SCNC: 4 MMOL/L (ref 3.4–5.3)
SODIUM SERPL-SCNC: 139 MMOL/L (ref 133–144)

## 2019-12-28 PROCEDURE — 25000132 ZZH RX MED GY IP 250 OP 250 PS 637: Mod: GY | Performed by: PHYSICIAN ASSISTANT

## 2019-12-28 PROCEDURE — 00000146 ZZHCL STATISTIC GLUCOSE BY METER IP

## 2019-12-28 PROCEDURE — 25000131 ZZH RX MED GY IP 250 OP 636 PS 637: Mod: GY | Performed by: INTERNAL MEDICINE

## 2019-12-28 PROCEDURE — 36415 COLL VENOUS BLD VENIPUNCTURE: CPT | Performed by: PHYSICIAN ASSISTANT

## 2019-12-28 PROCEDURE — 25000132 ZZH RX MED GY IP 250 OP 250 PS 637: Mod: GY | Performed by: NURSE PRACTITIONER

## 2019-12-28 PROCEDURE — 25000132 ZZH RX MED GY IP 250 OP 250 PS 637: Mod: GY | Performed by: PSYCHIATRY & NEUROLOGY

## 2019-12-28 PROCEDURE — 12400002 ZZH R&B MH SENIOR/ADOLESCENT

## 2019-12-28 PROCEDURE — 80048 BASIC METABOLIC PNL TOTAL CA: CPT | Performed by: PHYSICIAN ASSISTANT

## 2019-12-28 RX ORDER — LISINOPRIL 20 MG/1
20 TABLET ORAL DAILY
Status: DISCONTINUED | OUTPATIENT
Start: 2019-12-29 | End: 2019-12-31 | Stop reason: HOSPADM

## 2019-12-28 RX ADMIN — GLIPIZIDE 10 MG: 10 TABLET ORAL at 07:59

## 2019-12-28 RX ADMIN — ACETAMINOPHEN 650 MG: 325 TABLET, FILM COATED ORAL at 10:29

## 2019-12-28 RX ADMIN — INSULIN ASPART 5 UNITS: 100 INJECTION, SOLUTION INTRAVENOUS; SUBCUTANEOUS at 21:02

## 2019-12-28 RX ADMIN — Medication 0.5 MG: at 20:03

## 2019-12-28 RX ADMIN — DOCUSATE SODIUM 100 MG: 100 CAPSULE, LIQUID FILLED ORAL at 20:05

## 2019-12-28 RX ADMIN — LISINOPRIL 10 MG: 10 TABLET ORAL at 07:59

## 2019-12-28 RX ADMIN — HYDROXYZINE HYDROCHLORIDE 25 MG: 25 TABLET, FILM COATED ORAL at 20:06

## 2019-12-28 RX ADMIN — FUROSEMIDE 40 MG: 40 TABLET ORAL at 07:59

## 2019-12-28 RX ADMIN — HYDROXYZINE HYDROCHLORIDE 25 MG: 25 TABLET, FILM COATED ORAL at 10:20

## 2019-12-28 RX ADMIN — Medication 2.5 MG: at 15:55

## 2019-12-28 RX ADMIN — Medication 0.5 MG: at 07:59

## 2019-12-28 RX ADMIN — GLYCERIN, PETROLATUM, PHENYLEPHRINE HCL, PRAMOXINE HCL: 144; 2.5; 10; 15 CREAM TOPICAL at 20:03

## 2019-12-28 RX ADMIN — ATORVASTATIN CALCIUM 20 MG: 20 TABLET, FILM COATED ORAL at 20:03

## 2019-12-28 RX ADMIN — DOCUSATE SODIUM 100 MG: 100 CAPSULE, LIQUID FILLED ORAL at 07:59

## 2019-12-28 RX ADMIN — HYDROXYZINE HYDROCHLORIDE 25 MG: 25 TABLET, FILM COATED ORAL at 02:15

## 2019-12-28 RX ADMIN — GLYCERIN, PETROLATUM, PHENYLEPHRINE HCL, PRAMOXINE HCL: 144; 2.5; 10; 15 CREAM TOPICAL at 11:00

## 2019-12-28 RX ADMIN — TRAZODONE HYDROCHLORIDE 50 MG: 50 TABLET ORAL at 02:15

## 2019-12-28 ASSESSMENT — ACTIVITIES OF DAILY LIVING (ADL)
HYGIENE/GROOMING: WITH ASSISTANCE
DRESS: STREET CLOTHES;SCRUBS (BEHAVIORAL HEALTH);WITH ASSISTANCE
HYGIENE/GROOMING: WITH ASSISTANCE
DRESS: SCRUBS (BEHAVIORAL HEALTH)
ORAL_HYGIENE: WITH ASSISTANCE
ORAL_HYGIENE: WITH ASSISTANCE

## 2019-12-28 ASSESSMENT — MIFFLIN-ST. JEOR: SCORE: 803.5

## 2019-12-28 NOTE — PLAN OF CARE
48 HOUR NURSING ASSESSMENT:  Pt has been pleasant and cooperative. Pt mute at the start of the shift. Pt did begin talking later in the morning. Pt was offered/accepted prn vistaril 25 mg. Pt did shake her head yes when she was asked if she was feeling anxious- thru the use of the CART interpretor. Pt enjoys talking about her grand children and her past.   Pt noted to be talking in a high pitch voice. Pt identified this but was unable to state why she was doing this.   Pt did not eat well at breakfast. Pt has been taking fluids - Boost, prune juice and milk.   Mepiplex was changed on coccyx area -No noted redness or breakdown.     Pt has complained of left leg soreness as well as left shoulder pain. Prn tylenol 650 mg administered. Swelling in left leg/foot has improved.     Pt has been ambulating with wheeled walker, SBA and gait belt.     Pt has been medication compliant. In the last 48 hours patient has used prn tylenol 650 mg x 1, prn trazodone 50 x 1 and prn vistaril 25 mg x 6.     Pt has documented sleep between 7 and 8 hours a night.
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
## 2019-12-28 NOTE — PROGRESS NOTES
Internal Medicine Follow Up Note     Patient: Michaela Soria  MRN: [id number]  Admission Date: 12/6/2019  Hospital Day # 22    Assessment & Recommendations: Michaela Soria is a 89 year old woman with a history of HFpEF, implanted pacemaker, CKD, DM, HLD, HTN, bilateral breast Ca s/p bilateral mastectomies, schizophrenia, resting tremor  who is admitted to station 3[facility] with psychiatric decompensation.       Left LE edema  Examination today shows persistent edema that is solely in the foot with no leg edema and is 3+, dependent erythema that is blanching and resolves with elevation.  LE duplex yesterday negative.  Not clinically c/w cellulitis d/t lack of SIRS/leukocytosis/exam findings, not c/w gout d/t lack of pain. Most c/w peripheral vascular source/venous insufficiency, likely secondary to patient's prior left knee surgery.  Exam shows near resolution of edema on 12/27  -no lymphedema therapist available until 12/30--if remains inpatient and edema is not resolved, continue with consult    -please notify medicine if worsening erythema, pain or concerning symptoms      DM II  Hyperglycemia  BG remain elevated 160-210 on glucatrol 10mg.    -metformin continues to be contraindicated based on GFR, will repeat BMP in a.m.  -increased glucotrol to 7.5mg BID   -hyperglycemia protocol ordered  -hold off on sliding scale at this time    HTN  Remains asymptomatic of HTN overnight bp 150/68, pulse 70s-80s. Persistant after initiating lisinopril and up-titrating dose, 7.5mg yesterday.  -lisinopril 10mg no sufficient, increased to 20mg daily  -refrain from leg bp asssessments  -continue lasix    Abnormal UA  Incomplete bladder emptying  UA with +LE and 10 Wbc, no nitrites and few bacteria. Was questionable for UTI and was empirically started on keflex.  Urine cx result today 12/24 c/w normal sincere.  -encourage voids q3h while awake      HFpEF  Wt is stable and may not be completely reliable if taken with clothes and various  "times of day, no hypoxia.  Wt appears to be remaining stable 12/19-12/24 at ~49.7kg. 12/25 50.5kg and 12/26 48.8kg, no general trend of increased or decreased weights. Wt 48.8kg on 12/28.  -continue I&O and daily weights  -continue daily lasix    Assessment of nutrition status  Appreciate RD input  -per RD recs continue Boost TID with meals    Medicine will follow up on bp and BMP, please page with any additional concerns.     Tari Mclain PA-C  Hospitalist Service  Pager: [phone]  _________________________________________________________________    Subjective & Interval History:  Chief complaint of confusion in setting of schizophrenia    Medications: Reviewed in EPIC.    Physical Exam:    Blood pressure (!) 155/78, pulse 78, temperature 98.5  F (36.9  C), temperature source Oral, resp. rate 16, height 1.499 m (4' 11.02\"), weight 48.8 kg (107 lb 8 oz), SpO2 95 %, not currently breastfeeding.   Gen: A&O and attentive, appropriate responses, tremulous  Chest: nonlabored breathing  LE: left foot with dependent blanching erythema of the large toe, no tenderness, 1+ edema, no fluctuance or open lesions, no ankle/leg edema, + bilateral 2+ pulses    Labs & Studies of Note: I personally reviewed the following studies:  basic metabolic panel, UA    "

## 2019-12-28 NOTE — PROGRESS NOTES
Patient up at 0145 to void.  Assistance X1 required.  Patient also tried have a bowel movement however she reported constipation.  Patient agreeable to drinking some prune juice to assist with constipation.  Overall patient has been social with 1:1 staff when awake.  Thoughts appear organized, speech is clear, however anxious.   Patient also requested medication for sleep.  PRN trazodone 50 mg and atarax 25 mg given at 0215.  Patient returned to sleep.  Voided X2, incontinent of urine X1 during the shift.  Total intake of 80 ml.

## 2019-12-28 NOTE — PLAN OF CARE
Sleeping 2.75-6 hrs at night .  Appetite usually quite good.  Meplex dressing changed on coccyx and skin is intact without and change in color, blanching is good.  In the past 2 days patient has gone through periods of being mute.  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  Continent of urine tonight.  P:  Continue same plan of care.

## 2019-12-29 LAB
GLUCOSE BLDC GLUCOMTR-MCNC: 158 MG/DL (ref 70–99)
GLUCOSE BLDC GLUCOMTR-MCNC: 203 MG/DL (ref 70–99)
GLUCOSE BLDC GLUCOMTR-MCNC: 268 MG/DL (ref 70–99)
GLUCOSE BLDC GLUCOMTR-MCNC: 268 MG/DL (ref 70–99)
GLUCOSE BLDC GLUCOMTR-MCNC: 288 MG/DL (ref 70–99)
GLUCOSE BLDC GLUCOMTR-MCNC: 322 MG/DL (ref 70–99)

## 2019-12-29 PROCEDURE — 25000132 ZZH RX MED GY IP 250 OP 250 PS 637: Mod: GY | Performed by: PHYSICIAN ASSISTANT

## 2019-12-29 PROCEDURE — 25000132 ZZH RX MED GY IP 250 OP 250 PS 637: Mod: GY | Performed by: NURSE PRACTITIONER

## 2019-12-29 PROCEDURE — 00000146 ZZHCL STATISTIC GLUCOSE BY METER IP

## 2019-12-29 PROCEDURE — 12400002 ZZH R&B MH SENIOR/ADOLESCENT

## 2019-12-29 PROCEDURE — 25000132 ZZH RX MED GY IP 250 OP 250 PS 637: Mod: GY | Performed by: PSYCHIATRY & NEUROLOGY

## 2019-12-29 RX ADMIN — Medication 7.5 MG: at 18:09

## 2019-12-29 RX ADMIN — ACETAMINOPHEN 650 MG: 325 TABLET, FILM COATED ORAL at 05:03

## 2019-12-29 RX ADMIN — DOCUSATE SODIUM 100 MG: 100 CAPSULE, LIQUID FILLED ORAL at 08:50

## 2019-12-29 RX ADMIN — Medication 7.5 MG: at 08:50

## 2019-12-29 RX ADMIN — Medication 0.5 MG: at 20:09

## 2019-12-29 RX ADMIN — TRAZODONE HYDROCHLORIDE 50 MG: 50 TABLET ORAL at 00:53

## 2019-12-29 RX ADMIN — Medication 0.5 MG: at 08:49

## 2019-12-29 RX ADMIN — ATORVASTATIN CALCIUM 20 MG: 20 TABLET, FILM COATED ORAL at 20:08

## 2019-12-29 RX ADMIN — INSULIN ASPART 2 UNITS: 100 INJECTION, SOLUTION INTRAVENOUS; SUBCUTANEOUS at 20:21

## 2019-12-29 RX ADMIN — HYDROXYZINE HYDROCHLORIDE 25 MG: 25 TABLET, FILM COATED ORAL at 00:53

## 2019-12-29 RX ADMIN — LISINOPRIL 20 MG: 20 TABLET ORAL at 08:51

## 2019-12-29 RX ADMIN — DOCUSATE SODIUM 100 MG: 100 CAPSULE, LIQUID FILLED ORAL at 20:09

## 2019-12-29 RX ADMIN — HYDROXYZINE HYDROCHLORIDE 25 MG: 25 TABLET, FILM COATED ORAL at 20:08

## 2019-12-29 RX ADMIN — FUROSEMIDE 40 MG: 40 TABLET ORAL at 08:51

## 2019-12-29 ASSESSMENT — ACTIVITIES OF DAILY LIVING (ADL)
HYGIENE/GROOMING: WITH ASSISTANCE
ORAL_HYGIENE: WITH ASSISTANCE

## 2019-12-29 NOTE — PLAN OF CARE
"48 HOUR NURSING ASSESSMENT:  Pt has been bright and interactive this AM. Pt able to recall staff members names but she is mildly confused. Pt reported that she believes that \"Dr. Zapata\" performed a operation last night on her left leg. Pt reports that he leg pain has decreased since yesterday. When patient was told that her blood sugars were elevated she smiled and stated \"I probably shouldn't have eaten that chocolate\".  Pt ate well at breakfast (100% of her Dominican toast, 50% of a banana and 100% of a container of applesauce) in addition patient drank 240 ml of chocolate boost and 120 ml of prune juice.   Pt continues to be medication compliant. Pt was started on sliding scale insulin with meals and HS. Pt was accepting of this AM and was able to state that she needed insulin for \"sugar diabetes\".   Pt remains on SIO for fall risk as well as vulnerability due to poor hearing. Pt has been ambulating with a wheeled walker, gait belt and SBA.  Pt is waiting for placement process to be completed.    Pt complained of feeling weak at approximately 1120- BG was checked as patient was started on sliding scale insulin last evening. BG at that time was 322. Tari/PA notified. Will recheck just prior to patient eating lunch as it has only been 2.5 hours since receiving AM Dose (3 units).    BG recheck was 288. Tari was contacted and Supplement changed to glucose control Boost vs Boost Plus. Pt took 100% of her tomato soup, 240 ml of chocolate glucose control boost as well as vanilla pudding. (3 units of sliding scale insulin administered).  Pt reports \"I want to live\". \"I'm not ready to die\". Pt has been visible in common areas for 75% of the shift. 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     "

## 2019-12-29 NOTE — PROGRESS NOTES
"Patient up at 0100 to void with assistance of 1.  Mepiplex intact on patients coccyx area.  No redness or skin breakdown noted.  Patient stating, \"I think I dropped a big one.\"  However no bowel movement noted.  Last BM on 12/28/19.  Patient requesting medication for sleep and anxiety.  PRN trazodone 50 mg and Atarax 25 mg administered.  Patient was started on sliding scale insulin due to elevated BS.  BS of 158 at 0110.  Will continue to monitor.  Patient complaining of pain in multiple locations at 0500.  Rating pain at a 7.  Reports difficulty sleeping as a result of the pain.  PRN tylenol 650 mg administered at 0503. Patient very talkative this AM.  Racing thoughts and tangential.  Overheard asking the 1:1 staff multiple questions: \"is there anything moving in my bed.\"   \"Am I dying.\" \"Is the pain in my leg because of my diabetes?\"  Patient oriented X2.  Reports tylenol was effective for her pain.  Encouraged to discuss concerns with her provider. Mood is tense and blunt, however cooperative.  Had 30 ml of water and 200 ml of boost glucose control.  Total intake of 230 mls.  Voided X1.        "

## 2019-12-29 NOTE — PROGRESS NOTES
Internal Medicine Follow Up Note     Patient: Michaela Soria  MRN: [id number]  Admission Date: 12/6/2019  Hospital Day # 23    Assessment & Recommendations: Michaela Soria is a 89 year old woman with a history of HFpEF, implanted pacemaker, CKD, DM, HLD, HTN, bilateral breast Ca s/p bilateral mastectomies, schizophrenia, resting tremor  who is admitted to station 3[facility] with psychiatric decompensation.       DM II  Hyperglycemia  BG last night during the evening were 308-440 on glucatrol 7.5mg BID. Started on medium sliding scale last night.    -consider endocrinology consult  -will observe bp trend today and on sliding scale and consider either increase in glucotrol or per endocrinology recs  -metformin continues to be contraindicated based on GFR, will repeat BMP in a.m.  -hyperglycemia protocol ordered    HTN  Remains asymptomatic of HTN overnight bp 150/68, pulse 70s-80s. Persistant after initiating lisinopril and up-titrating dose, 7.5mg yesterday. Today bp 180/69 this a.m--approximately at same time as lisinopril dose given  -lisinopril 10mg no sufficient, increased to 20mg daily on 12/28  -refrain from leg bp asssessments  -continue lasix    Left LE edema (resolved)  Examination today shows persistent edema that is solely in the foot with no leg edema and is 3+, dependent erythema that is blanching and resolves with elevation.  LE duplex yesterday negative.  Not clinically c/w cellulitis d/t lack of SIRS/leukocytosis/exam findings, not c/w gout d/t lack of pain. Most c/w peripheral vascular source/venous insufficiency, likely secondary to patient's prior left knee surgery.  Exam shows near resolution of edema on 12/27. Exam stable with no CSM deficits, new lesions, or worsening erythema or edema.  -no lymphedema therapist available until 12/30--if remains inpatient and edema is not resolved, continue with consult    -continues to be improved with elevation of the feet and serial examinations stable  -please  "notify medicine if worsening erythema, pain or concerning symptoms      HFpEF  Wt is stable and may not be completely reliable if taken with clothes and various times of day, no hypoxia.  Wt appears to be remaining stable 12/19-12/24 at ~49.7kg. 12/25 50.5kg and 12/26 48.8kg, no general trend of increased or decreased weights. Wt 48.8kg on 12/28.  -continue I&O and daily weights  -continue daily lasix        See prior notes regarding assessment of nutritional status and HfpEF    Medicine will follow up on bp and BMP, please page with any additional concerns.     Tari Mclain PA-C  Hospitalist Service  Pager: [phone]  _________________________________________________________________    Subjective & Interval History:  Chief complaint of confusion in setting of schizophrenia    Medications: Reviewed in EPIC.    Physical Exam:    Blood pressure (!) 146/69, pulse 81, temperature 98.9  F (37.2  C), temperature source Tympanic, resp. rate 16, height 1.499 m (4' 11.02\"), weight 48.8 kg (107 lb 8 oz), SpO2 97 %, not currently breastfeeding.       Labs & Studies of Note: I personally reviewed the following studies:  basic metabolic panel, UA    "

## 2019-12-29 NOTE — PROVIDER NOTIFICATION
Jacy DAWKINS notified of BG of 440.  Will cover with sliding scale insulin as ordered.    BG now 308.

## 2019-12-29 NOTE — PROGRESS NOTES
Pt was in her room most of the shift. Pt was out for meals only and would request to go to her room due to feeling tired. Pt's appetite is well. Pt is still experiencing some constipation. Pt's mood was calm and reports having a good day. Pt was worried about offending others during dinner and excessively apologizing. Pt admits to feelings of anxiety and depression. Pt denies all other mental health symptoms. Pt had no other concerns.         12/28/19 1900   Behavioral Health   Hallucinations denies / not responding to hallucinations   Thinking distractable;poor concentration   Orientation person: oriented;place: oriented   Memory new learning, recall loss   Insight poor   Judgement impaired   Eye Contact at examiner   Affect blunted, flat   Mood anxious;mood is calm   Physical Appearance/Attire attire appropriate to age and situation   Hygiene well groomed   Suicidality   (pt denies)   1. Wish to be Dead (Recent) No   2. Non-Specific Active Suicidal Thoughts (Recent) No   Self Injury   (None stated and none observed )   Elopement   (None )   Activity isolative;withdrawn   Speech pressured   Medication Sensitivity no stated side effects;no observed side effects   Psychomotor / Gait tremors;slow;unsteady   Activities of Daily Living   Hygiene/Grooming with assistance   Oral Hygiene with assistance   Dress scrubs (behavioral health)   Room Organization unable   Activity   Activity Assistance Provided assistance, 1 person   Assistive Device Utilized walker

## 2019-12-30 LAB
GLUCOSE BLDC GLUCOMTR-MCNC: 140 MG/DL (ref 70–99)
GLUCOSE BLDC GLUCOMTR-MCNC: 240 MG/DL (ref 70–99)
GLUCOSE BLDC GLUCOMTR-MCNC: 255 MG/DL (ref 70–99)
GLUCOSE BLDC GLUCOMTR-MCNC: 91 MG/DL (ref 70–99)

## 2019-12-30 PROCEDURE — 99232 SBSQ HOSP IP/OBS MODERATE 35: CPT | Performed by: PSYCHIATRY & NEUROLOGY

## 2019-12-30 PROCEDURE — 00000146 ZZHCL STATISTIC GLUCOSE BY METER IP

## 2019-12-30 PROCEDURE — 25000132 ZZH RX MED GY IP 250 OP 250 PS 637: Mod: GY | Performed by: PHYSICIAN ASSISTANT

## 2019-12-30 PROCEDURE — 25000132 ZZH RX MED GY IP 250 OP 250 PS 637: Mod: GY | Performed by: NURSE PRACTITIONER

## 2019-12-30 PROCEDURE — H2032 ACTIVITY THERAPY, PER 15 MIN: HCPCS

## 2019-12-30 PROCEDURE — 12400002 ZZH R&B MH SENIOR/ADOLESCENT

## 2019-12-30 PROCEDURE — 25000132 ZZH RX MED GY IP 250 OP 250 PS 637: Mod: GY | Performed by: PSYCHIATRY & NEUROLOGY

## 2019-12-30 RX ADMIN — DOCUSATE SODIUM 100 MG: 100 CAPSULE, LIQUID FILLED ORAL at 09:13

## 2019-12-30 RX ADMIN — Medication 0.5 MG: at 09:12

## 2019-12-30 RX ADMIN — INSULIN ASPART 1 UNITS: 100 INJECTION, SOLUTION INTRAVENOUS; SUBCUTANEOUS at 21:29

## 2019-12-30 RX ADMIN — Medication 7.5 MG: at 09:12

## 2019-12-30 RX ADMIN — Medication 7.5 MG: at 17:02

## 2019-12-30 RX ADMIN — FUROSEMIDE 40 MG: 40 TABLET ORAL at 09:13

## 2019-12-30 RX ADMIN — HYDROXYZINE HYDROCHLORIDE 25 MG: 25 TABLET, FILM COATED ORAL at 12:55

## 2019-12-30 RX ADMIN — DOCUSATE SODIUM 100 MG: 100 CAPSULE, LIQUID FILLED ORAL at 20:51

## 2019-12-30 RX ADMIN — Medication 0.5 MG: at 20:51

## 2019-12-30 RX ADMIN — ATORVASTATIN CALCIUM 20 MG: 20 TABLET, FILM COATED ORAL at 20:51

## 2019-12-30 RX ADMIN — LISINOPRIL 20 MG: 20 TABLET ORAL at 09:14

## 2019-12-30 RX ADMIN — HYDROXYZINE HYDROCHLORIDE 25 MG: 25 TABLET, FILM COATED ORAL at 17:02

## 2019-12-30 ASSESSMENT — ACTIVITIES OF DAILY LIVING (ADL)
HYGIENE/GROOMING: WITH ASSISTANCE
HYGIENE/GROOMING: WITH ASSISTANCE
ORAL_HYGIENE: WITH ASSISTANCE
DRESS: STREET CLOTHES;SCRUBS (BEHAVIORAL HEALTH);WITH ASSISTANCE
LAUNDRY: UNABLE TO COMPLETE
ORAL_HYGIENE: WITH SUPERVISION
DRESS: STREET CLOTHES;WITH ASSISTANCE

## 2019-12-30 ASSESSMENT — MIFFLIN-ST. JEOR: SCORE: 803.95

## 2019-12-30 NOTE — PROGRESS NOTES
"  [facility], [place]   Psychiatric Progress Note        Interim History:   The patient's care was discussed with the treatment team during the daily team meeting and/or staff's chart notes were reviewed.  Staff report patient is doing well. She appears distracted at time and has expressed some delusional thoughts, but no paranoia noted and pleasant. Calm and engaged on approach. Slept 8hrs last night. Elevated Bs addressed by IM. BP improved. Forgetful and mild confusion. No dep, anx or SI.  Compliant with medications and care. Attending groups.  Independent with ADL.    The patient noted that she is doing \"fine\". The patient was calm and pleasant. Denied dep, anx and SI. C/o back pain at baseline. No hallucinations or racing thoughts. Sleeping and eating well. Tolerating medications well.     Discussed medications and care plan.        Medications:       atorvastatin  20 mg Oral QPM     docusate sodium  100 mg Oral BID     fluPHENAZine  0.5 mg Oral BID     furosemide  40 mg Oral Daily     glipiZIDE  7.5 mg Oral BID AC     insulin aspart  1-7 Units Subcutaneous TID AC     insulin aspart  1-5 Units Subcutaneous At Bedtime     lisinopril  20 mg Oral Daily          Allergies:     Allergies   Allergen Reactions     Strawberry Hives     Sulfa Drugs Nausea     mouth sores       Zomig [Zolmitriptan] Other (See Comments)     depression            Labs:     Recent Results (from the past 24 hour(s))   Glucose by meter    Collection Time: 12/29/19 11:18 AM   Result Value Ref Range    Glucose 322 (H) 70 - 99 mg/dL   Glucose by meter    Collection Time: 12/29/19 12:02 PM   Result Value Ref Range    Glucose 288 (H) 70 - 99 mg/dL   Glucose by meter    Collection Time: 12/29/19  3:51 PM   Result Value Ref Range    Glucose 203 (H) 70 - 99 mg/dL   Glucose by meter    Collection Time: 12/29/19  8:16 PM   Result Value Ref Range    Glucose 268 (H) 70 - 99 mg/dL   Glucose by meter    Collection Time: " "12/30/19  8:19 AM   Result Value Ref Range    Glucose 140 (H) 70 - 99 mg/dL          Psychiatric Examination:     Vitals:    12/29/19 0851 12/29/19 1115 12/29/19 1630 12/30/19 0823   BP: (!) 180/64 129/69 129/60 (!) 155/76   BP Location:  Right arm Right arm Right arm   Pulse:   77 73   Resp:       Temp:   98.1  F (36.7  C) 98.8  F (37.1  C)   TempSrc:   Oral Oral   SpO2:   94% 94%   Weight:       Height:                         Sitting Orthostatic BP: 206/104      Sitting Orthostatic Pulse: 92 bpm                     Weight is 107 lbs 8 oz  Body mass index is 21.7 kg/m .    Appearance: awake, alert, adequately groomed, appeared as age stated and no apparent distress   Attitude:  cooperative  Eye Contact:  good  Mood:  \"fine\"  Affect:  full range and reactive  Speech:  clear, coherent and normal prosody and soft.  Psychomotor Behavior:  no evidence of tardive dyskinesia, dystonia, or tics and tremor observed   Throught Process:  linear  Associations:  no loose associations  Thought Content:  no evidence of suicidal ideation or homicidal ideation, no auditory hallucinations present and no visual hallucinations present  Insight:  fair  Judgement:  intact  Oriented to:  partial  Attention Span and Concentration:  fair  Recent and Remote Memory:  fair         Precautions:     Behavioral Orders   Procedures     Code 1 - Restrict to Unit     Code 2     Fall precautions     Routine Programming     As clinically indicated     Single Room     Skin care precautions     coccyx: Protect coccyx with Mepilex dressing, replace as needed, encourage her to repositon every 2 hours     Status 15     Every 15 minutes.     Status Individual Observation     High risk for falls; Pt. is impulsive.     Order Specific Question:   CONTINUOUS 24 hours / day     Answer:   5 feet     Order Specific Question:   Indications for SIO     Answer:   Medical equipment / ligature risk          Diagnoses:     1.  Schizophrenia by history.   2.  Rule out " cognitive impairment due to chronic dementia-type process.   3.  Hard of hearing.          Plan:     Medications:  -- Librium was discontinued and placed on Benzodiazepine withdrawal protocol with Phenobarb taper. Phenobarb taper completed uneventfully.   -- Prolixin 0.5 mg, bid continued.   -- PRN Hydroxyzine, Zyprexa, Trazodone.     Legal Status and Disposition:  -- volunt.   -- discharge to  once mood stabilization and safety in the community established. Awaiting Obra II funding.

## 2019-12-30 NOTE — PROGRESS NOTES
" 12/30/19 2200   Art Therapy   Type of Intervention structured groups   Response Participated with encouragement   Hours 1   Treatment Detail    (Art Therapy - watercolor/ affirmations/ encouragement/ DBT ABC and PLEASE skills   Goal- cope, express, regulate and reflect through Art Therapy directives     Outcome- Pt attended afternoon group. In the morning she tried but was so fatigued  She was falling asleep at the table , her staff brought her for a nap. In the afternoon her hands were shaking a lot and she appeared hesitant to put the pen or brush to paper, as she is an accomplished . Writer held her hand to make the first flower together and from there she got confidence and did a bouquet of roses with colored markers. Her body seemed to calm and she was very specific about communicating the color choices etc. She gave her piece a title, \" roses\". She wanted purple roses and she wrote her name on the piece as well. She was accepting of the shaky hands and used a dot (pointillist) texture to create the piece and she reported liking it. She said she was tired and wanted to nap after her art was completed and group was ending. She became more talkative as she engaged in the art making.              "

## 2019-12-30 NOTE — PLAN OF CARE
PT: Order received for edema last week. Spoke with nurse and appears this has resolved with elevation. No acute needs identified at this time. Will complete edema orders.

## 2019-12-30 NOTE — PROGRESS NOTES
Left voice message with Jill at [facility] ([phone]) to determine status of Obra II screening.

## 2019-12-30 NOTE — PROGRESS NOTES
Patient voided x1 overnight.  Mepiplex dressing on coccyx intact.  Skin free of irritation and breakdown.  Urine output of 200 mls total.  Bladder scan of 0 this AM.  Total intake of 30 mls.  Patient slept for 8 hrs.  Irritable but mute this AM.

## 2019-12-30 NOTE — PROGRESS NOTES
Pt pleasant and cooperative during interactions. Pt reports that she has felt tired this morning and napped midmorning after she had  fell asleep in group.  Pt mute and mouthing words. Pt has verbalized needs when she wants something and did reminisce about her younger years. Pt was able to identify that she was feeling anxious. Prn hydroxyzine 25 mg was offered/accepted.   Pt's eye contact is appropriate and she brightens during interactions.     Pt's intake has been poor. Pt took approximately 25% of solids and 600 ml of fluids. . Pt has needed encouragement to eat and drink.

## 2019-12-31 VITALS
OXYGEN SATURATION: 96 % | HEIGHT: 59 IN | SYSTOLIC BLOOD PRESSURE: 162 MMHG | DIASTOLIC BLOOD PRESSURE: 70 MMHG | HEART RATE: 75 BPM | WEIGHT: 107.6 LBS | RESPIRATION RATE: 16 BRPM | TEMPERATURE: 97.7 F | BODY MASS INDEX: 21.69 KG/M2

## 2019-12-31 LAB
GLUCOSE BLDC GLUCOMTR-MCNC: 141 MG/DL (ref 70–99)
GLUCOSE BLDC GLUCOMTR-MCNC: 253 MG/DL (ref 70–99)

## 2019-12-31 PROCEDURE — 25000132 ZZH RX MED GY IP 250 OP 250 PS 637: Mod: GY | Performed by: NURSE PRACTITIONER

## 2019-12-31 PROCEDURE — 99238 HOSP IP/OBS DSCHRG MGMT 30/<: CPT | Performed by: PSYCHIATRY & NEUROLOGY

## 2019-12-31 PROCEDURE — G0177 OPPS/PHP; TRAIN & EDUC SERV: HCPCS

## 2019-12-31 PROCEDURE — 00000146 ZZHCL STATISTIC GLUCOSE BY METER IP

## 2019-12-31 PROCEDURE — H2032 ACTIVITY THERAPY, PER 15 MIN: HCPCS

## 2019-12-31 PROCEDURE — 25000132 ZZH RX MED GY IP 250 OP 250 PS 637: Mod: GY | Performed by: PHYSICIAN ASSISTANT

## 2019-12-31 PROCEDURE — 25000132 ZZH RX MED GY IP 250 OP 250 PS 637: Mod: GY | Performed by: PSYCHIATRY & NEUROLOGY

## 2019-12-31 RX ORDER — LISINOPRIL 20 MG/1
20 TABLET ORAL DAILY
DISCHARGE
Start: 2020-01-01

## 2019-12-31 RX ORDER — GLIPIZIDE 5 MG/1
7.5 TABLET ORAL
DISCHARGE
Start: 2019-12-31 | End: 2023-01-01

## 2019-12-31 RX ORDER — FLUPHENAZINE HYDROCHLORIDE 1 MG/1
0.5 TABLET ORAL 2 TIMES DAILY
Qty: 30 TABLET | Refills: 0 | COMMUNITY
Start: 2019-12-31

## 2019-12-31 RX ORDER — HYDROXYZINE HYDROCHLORIDE 25 MG/1
25 TABLET, FILM COATED ORAL EVERY 4 HOURS PRN
Qty: 10 TABLET | Refills: 0 | COMMUNITY
Start: 2019-12-31 | End: 2023-01-01

## 2019-12-31 RX ORDER — DOCUSATE SODIUM 100 MG/1
100 CAPSULE, LIQUID FILLED ORAL 2 TIMES DAILY
Qty: 60 CAPSULE | Refills: 0 | COMMUNITY
Start: 2019-12-31 | End: 2023-01-01

## 2019-12-31 RX ORDER — FUROSEMIDE 40 MG
40 TABLET ORAL DAILY
Qty: 90 TABLET | Refills: 0 | DISCHARGE
Start: 2019-12-31

## 2019-12-31 RX ORDER — TRAZODONE HYDROCHLORIDE 50 MG/1
50 TABLET, FILM COATED ORAL
COMMUNITY
Start: 2019-12-31

## 2019-12-31 RX ADMIN — Medication 0.5 MG: at 09:01

## 2019-12-31 RX ADMIN — DOCUSATE SODIUM 100 MG: 100 CAPSULE, LIQUID FILLED ORAL at 09:01

## 2019-12-31 RX ADMIN — FUROSEMIDE 40 MG: 40 TABLET ORAL at 09:01

## 2019-12-31 RX ADMIN — LISINOPRIL 20 MG: 20 TABLET ORAL at 09:01

## 2019-12-31 RX ADMIN — HYDROXYZINE HYDROCHLORIDE 25 MG: 25 TABLET, FILM COATED ORAL at 10:29

## 2019-12-31 RX ADMIN — Medication 7.5 MG: at 09:01

## 2019-12-31 ASSESSMENT — ACTIVITIES OF DAILY LIVING (ADL)
ORAL_HYGIENE: WITH ASSISTANCE
DRESS: INDEPENDENT;WITH ASSISTANCE
HYGIENE/GROOMING: WITH ASSISTANCE

## 2019-12-31 NOTE — PROGRESS NOTES
Pt will discharge to [facility] today.  Transportation scheduled for 2:30 pm.  Notified family.  AVS to be faxed to [phone].

## 2019-12-31 NOTE — PROGRESS NOTES
Patient slept comfortably for 6.25 hrs.  Voided once this AM with assistance of 1. No intake this shift.

## 2019-12-31 NOTE — PROGRESS NOTES
"Pt pleasant and cooperative. Pt reporting increased anxiety and was given prn vistaril 25 mg. Pt upset that she will be discharging from the hospital today. Pt initially stated \"I'm not going\". Pt attended programming. Pt able to talk in an audible level when asked.   Pt ate fair at both meals.   Pt was assisted with ADL's  All belongings listed on valuables list were sent with patient.   Pt noted to have security envelope with meds after she was discharged. [facility] will follow up with sending these to patient.     Pt appreciative of care. Pt transported via [facility] transport at 1425.     Signed AVS was sent with .  "

## 2019-12-31 NOTE — PROGRESS NOTES
"Pt expressed herself completely nonverbally.  She often closed her eyes and cupped her hands into a \"pillow\" to rest her head.  She resisted communication based on the visual, typed words on a screen, but thrived and brightened with face-to-face nonverbal peer interaction.  She was supported by [facility] staff for part of a session.       12/31/19 1130   Dance Movement Therapy   Type of Intervention structured groups   Response participates with encouragement   Hours 0.5     "

## 2019-12-31 NOTE — DISCHARGE INSTRUCTIONS
Behavioral Discharge Planning and Instructions      Summary:  You were admitted on 12/6/2019  due to altered mental status.  You were treated by Dr. Thai Pro MD and discharged on 12/31/2019  from [facility] to Skilled Nursing:    [address] [address]   Phone: [phone]      Principal Diagnosis:   1.  Schizophrenia by history.   2.  Rule out cognitive impairment due to chronic dementia-type process.   3.  Extremely hard of hearing.   4.  Type 2 diabetes.   5.  Chronic kidney disease.   6.  Heart failure.   7.  Hypertension.     Health Care Follow-up Appointments:   PCP-   Ruddy Whitten - Tuesday, January 14th at 2:25pm  [place], [place]   [phone].    Attend all scheduled appointments with your outpatient providers. Call at least 24 hours in advance if you need to reschedule an appointment to ensure continued access to your outpatient providers.   Pt enjoys painting/watercolors, sketching, playing the piano/keyboard, and reminiscing.   She is very hard of hearing. Pt does well with reading written messages.   Pt likes to take her pills with applesauce or pudding. Pt especially likes chocolate.   Pt's last BM was on 12/30/19.   Major Treatments, Procedures and Findings:  You were provided with: a psychiatric assessment, assessed for medical stability and medication evaluation and/or management    Symptoms to Report: feeling more aggressive, increased confusion, losing more sleep, mood getting worse or thoughts of suicide    Early warning signs can include: increased depression or anxiety sleep disturbances increased thoughts or behaviors of suicide or self-harm  increased unusual thinking, such as paranoia or hearing voices    Safety and Wellness:  Take all medicines as directed.  Make no changes unless your doctor suggests them.      Follow treatment recommendations.  Refrain from alcohol and non-prescribed drugs.  If there  is a concern for safety, call 911.    Resources:   Crisis Intervention: 187.182.2742 or 499-431-8366 (TTY: 975.864.2168).  Call anytime for help.  National [place] on Mental Illness (www.[place].s[place].org): 724.632.9696 or 256-106-5391.  Suicide Awareness Voices of Education (SAVE) (www.save.org): 027-225-HOTO (9942)  National Suicide Prevention Line (www.mentalhealthmn.org): 566-574-JVTB (7443)  Mental Health Consumer/Survivor Network of [place] (www.mhcsn.net): 453.944.4149 or 652-839-4214  Mental Health Association of [place] (www.mentalhealth.org): 799.449.2181 or 279-747-3087  Self- Management and Recovery Training., SMART-- Toll free: 245.488.1970  www.S[employer].it.org  [facility] Crisis (COPE) Response - Adult [phone]  [facility] Crisis Response - Adult [phone]      The treatment team has appreciated the opportunity to work with you.     If you have any questions or concerns our unit number is [phone].

## 2019-12-31 NOTE — PROGRESS NOTES
Attended 1 of 2 OT groups. She appears attentive on task, made decisions and followed through on a familiar activity. She answered questions, spoke louder and asked if she was speaking loud enough for people to hear. She appears more involved, organized and alert.
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
## 2019-12-31 NOTE — DISCHARGE SUMMARY
Psychiatric Discharge Summary    Michaela Soria MRN# [id number]   Age: 89 year old YOB: 1927     Date of Admission:  12/6/2019  Date of Discharge:  12/31/2019  Admitting Physician:  Thai Pro MD  Discharge Physician:  Thai Pro MD         Event Leading to Hospitalization:     The patient is a 89-year-old woman who was admitted due to concerns of psychosis.  She requires a significant amount of cares from her family.  Here in the hospital, she seems extremely unstable on her feet.  We have had to keep her on 1:1 staffing, just to prevent falls.  Here, she has talked about knowing there are people living in her tree and that there are shadows that come into her room at night; however, she does not seem distressed by them.  She states that her family thinks she is crazy and her family is just jealous because the people in her tree and the shadows that come in at night, love her.  The patient denies that these shadows or people are problematic for her.  In fact, when she is talking to nursing, she would be curious statements about wondering how they managed to live outside, given it is so cold.  When I asked if there is anything I can do for her, she asked me just to believe her.  She was not agitated, not out of control.  Really, did not demonstrate any signs that she required this level of care.  She definitely is displaying psychosis but it appears that she likely could be managed at this point in time.  I believe that we will need to get further collateral directly from family for a better idea of exactly what we are treating.  The patient is extremely hard of hearing and thus the only way to communicate with her is either via writing or as we had done, use a computer with a word processing program and extremely large font that she can read.  As such, the interview takes a significant amount of time just to get very little information.  Overall, I spent well over 60 minutes with her face to  face and was not able to get much information.  Collateral is going to be extremely important in this case.      The patient has a history of schizophrenia, according to chart review; however, has not been hospitalized about 20 years.  The patient denies any substance use.      See Admission note by Miles Mccray MD on 12/7/2019  for additional details.          Diagnoses:     1.  Schizophrenia by history.   2.  Rule out cognitive impairment due to chronic dementia-type process.   3.  Hard of hearing.          Labs:     Recent Results (from the past 1008 hour(s))   UA with Microscopic    Collection Time: 12/05/19 12:14 PM   Result Value Ref Range    Color Urine Yellow     Appearance Urine Clear     Glucose Urine Negative NEG^Negative mg/dL    Bilirubin Urine Negative NEG^Negative    Ketones Urine Negative NEG^Negative mg/dL    Specific Gravity Urine 1.012 1.003 - 1.035    Blood Urine Negative NEG^Negative    pH Urine 7.0 5.0 - 7.0 pH    Protein Albumin Urine Negative NEG^Negative mg/dL    Urobilinogen mg/dL 0.0 0.0 - 2.0 mg/dL    Nitrite Urine Negative NEG^Negative    Leukocyte Esterase Urine Small (A) NEG^Negative    Source Midstream Urine     WBC Urine 5 0 - 5 /HPF    RBC Urine 2 0 - 2 /HPF    Bacteria Urine Few (A) NEG^Negative /HPF    Mucous Urine Present (A) NEG^Negative /LPF   Urine Culture    Collection Time: 12/05/19 12:14 PM   Result Value Ref Range    Specimen Description Midstream Urine     Special Requests Specimen received in preservative     Culture Micro       10,000 to 50,000 colonies/mL  mixed urogenital sincere     CBC with platelets differential    Collection Time: 12/05/19 12:30 PM   Result Value Ref Range    WBC 7.2 4.0 - 11.0 10e9/L    RBC Count 4.77 3.8 - 5.2 10e12/L    Hemoglobin 13.8 11.7 - 15.7 g/dL    Hematocrit 43.8 35.0 - 47.0 %    MCV 92 78 - 100 fl    MCH 28.9 26.5 - 33.0 pg    MCHC 31.5 31.5 - 36.5 g/dL    RDW 13.4 10.0 - 15.0 %    Platelet Count 168 150 - 450 10e9/L    Diff  Method Automated Method     % Neutrophils 67.9 %    % Lymphocytes 20.4 %    % Monocytes 8.4 %    % Eosinophils 2.4 %    % Basophils 0.6 %    % Immature Granulocytes 0.3 %    Nucleated RBCs 0 0 /100    Absolute Neutrophil 4.9 1.6 - 8.3 10e9/L    Absolute Lymphocytes 1.5 0.8 - 5.3 10e9/L    Absolute Monocytes 0.6 0.0 - 1.3 10e9/L    Absolute Eosinophils 0.2 0.0 - 0.7 10e9/L    Absolute Basophils 0.0 0.0 - 0.2 10e9/L    Abs Immature Granulocytes 0.0 0 - 0.4 10e9/L    Absolute Nucleated RBC 0.0    Comprehensive metabolic panel    Collection Time: 12/05/19 12:30 PM   Result Value Ref Range    Sodium 144 133 - 144 mmol/L    Potassium 4.0 3.4 - 5.3 mmol/L    Chloride 106 94 - 109 mmol/L    Carbon Dioxide 30 20 - 32 mmol/L    Anion Gap 8 3 - 14 mmol/L    Glucose 114 (H) 70 - 99 mg/dL    Urea Nitrogen 25 7 - 30 mg/dL    Creatinine 1.14 (H) 0.52 - 1.04 mg/dL    GFR Estimate 41 (L) >60 mL/min/[1.73_m2]    GFR Estimate If Black 48 (L) >60 mL/min/[1.73_m2]    Calcium 9.9 8.5 - 10.1 mg/dL    Bilirubin Total 0.6 0.2 - 1.3 mg/dL    Albumin 3.8 3.4 - 5.0 g/dL    Protein Total 6.9 6.8 - 8.8 g/dL    Alkaline Phosphatase 83 40 - 150 U/L    ALT 35 0 - 50 U/L    AST 27 0 - 45 U/L   Glucose by meter    Collection Time: 12/06/19  8:39 PM   Result Value Ref Range    Glucose 176 (H) 70 - 99 mg/dL   Fluphenazine level    Collection Time: 12/07/19  8:19 AM   Result Value Ref Range    Fluphenazine Level <0.2 (L) 1.0 - 10.0 ng/mL   Vitamin B12    Collection Time: 12/07/19  8:19 AM   Result Value Ref Range    Vitamin B12 517 193 - 986 pg/mL   Folate    Collection Time: 12/07/19  8:19 AM   Result Value Ref Range    Folate 12.7 >5.4 ng/mL   Vitamin D    Collection Time: 12/07/19  8:19 AM   Result Value Ref Range    Vitamin D Deficiency screening 23 20 - 75 ug/L   Chlordiazepoxide serum blood level: Laboratory Miscellaneous Order    Collection Time: 12/07/19  8:19 AM   Result Value Ref Range    Miscellaneous Test         Specimen Received, Reordered  and sent to Performing laboratory - Report to follow upon   completion.     Comprehensive metabolic panel    Collection Time: 12/07/19  8:19 AM   Result Value Ref Range    Sodium 138 133 - 144 mmol/L    Potassium 4.3 3.4 - 5.3 mmol/L    Chloride 103 94 - 109 mmol/L    Carbon Dioxide 32 20 - 32 mmol/L    Anion Gap 3 3 - 14 mmol/L    Glucose 166 (H) 70 - 99 mg/dL    Urea Nitrogen 30 7 - 30 mg/dL    Creatinine 1.16 (H) 0.52 - 1.04 mg/dL    GFR Estimate 41 (L) >60 mL/min/[1.73_m2]    GFR Estimate If Black 47 (L) >60 mL/min/[1.73_m2]    Calcium 9.3 8.5 - 10.1 mg/dL    Bilirubin Total 0.5 0.2 - 1.3 mg/dL    Albumin 3.4 3.4 - 5.0 g/dL    Protein Total 6.6 (L) 6.8 - 8.8 g/dL    Alkaline Phosphatase 93 40 - 150 U/L    ALT 30 0 - 50 U/L    AST 25 0 - 45 U/L   Hemoglobin A1c    Collection Time: 12/07/19  8:19 AM   Result Value Ref Range    Hemoglobin A1C 7.5 (H) 0 - 5.6 %   [facility] Miscellaneous Test    Collection Time: 12/07/19  8:19 AM   Result Value Ref Range    Result SEE NOTE 12/17/2019 10:53 AM     Test Name CHLORDIAZEPOXIDE AND METABOLITE SERUM      Send Outs Misc Test Code CDP     Send Outs Misc Test Specimen Serum    Glucose by meter    Collection Time: 12/07/19  5:08 PM   Result Value Ref Range    Glucose 271 (H) 70 - 99 mg/dL   Glucose by meter    Collection Time: 12/07/19  8:57 PM   Result Value Ref Range    Glucose 309 (H) 70 - 99 mg/dL   Glucose by meter    Collection Time: 12/07/19 11:12 PM   Result Value Ref Range    Glucose 182 (H) 70 - 99 mg/dL   Glucose by meter    Collection Time: 12/08/19  7:23 AM   Result Value Ref Range    Glucose 190 (H) 70 - 99 mg/dL   Glucose by meter    Collection Time: 12/08/19 12:10 PM   Result Value Ref Range    Glucose 113 (H) 70 - 99 mg/dL   Glucose by meter    Collection Time: 12/08/19  5:01 PM   Result Value Ref Range    Glucose 191 (H) 70 - 99 mg/dL   Glucose by meter    Collection Time: 12/09/19  7:22 AM   Result Value Ref Range    Glucose 136 (H) 70 - 99 mg/dL   Basic  metabolic panel    Collection Time: 12/09/19  8:07 AM   Result Value Ref Range    Sodium 139 133 - 144 mmol/L    Potassium 3.6 3.4 - 5.3 mmol/L    Chloride 104 94 - 109 mmol/L    Carbon Dioxide 31 20 - 32 mmol/L    Anion Gap 4 3 - 14 mmol/L    Glucose 204 (H) 70 - 99 mg/dL    Urea Nitrogen 29 7 - 30 mg/dL    Creatinine 1.07 (H) 0.52 - 1.04 mg/dL    GFR Estimate 45 (L) >60 mL/min/[1.73_m2]    GFR Estimate If Black 52 (L) >60 mL/min/[1.73_m2]    Calcium 8.9 8.5 - 10.1 mg/dL   EKG 12-lead, complete    Collection Time: 12/09/19 11:59 AM   Result Value Ref Range    Interpretation ECG Click View Image link to view waveform and result    Glucose by meter    Collection Time: 12/09/19 12:04 PM   Result Value Ref Range    Glucose 171 (H) 70 - 99 mg/dL   Glucose by meter    Collection Time: 12/09/19  4:54 PM   Result Value Ref Range    Glucose 179 (H) 70 - 99 mg/dL   Glucose by meter    Collection Time: 12/10/19  8:00 AM   Result Value Ref Range    Glucose 111 (H) 70 - 99 mg/dL   Glucose by meter    Collection Time: 12/10/19 12:59 PM   Result Value Ref Range    Glucose 354 (H) 70 - 99 mg/dL   Glucose by meter    Collection Time: 12/10/19  5:15 PM   Result Value Ref Range    Glucose 196 (H) 70 - 99 mg/dL   Glucose by meter    Collection Time: 12/11/19  7:52 AM   Result Value Ref Range    Glucose 169 (H) 70 - 99 mg/dL   Glucose by meter    Collection Time: 12/11/19  5:25 PM   Result Value Ref Range    Glucose 184 (H) 70 - 99 mg/dL   Glucose by meter    Collection Time: 12/12/19  7:47 AM   Result Value Ref Range    Glucose 157 (H) 70 - 99 mg/dL   Glucose by meter    Collection Time: 12/12/19  4:44 PM   Result Value Ref Range    Glucose 212 (H) 70 - 99 mg/dL   Glucose by meter    Collection Time: 12/13/19  7:28 AM   Result Value Ref Range    Glucose 203 (H) 70 - 99 mg/dL   Glucose by meter    Collection Time: 12/13/19  5:04 PM   Result Value Ref Range    Glucose 199 (H) 70 - 99 mg/dL   Glucose by meter    Collection Time: 12/14/19   7:54 AM   Result Value Ref Range    Glucose 217 (H) 70 - 99 mg/dL   Glucose by meter    Collection Time: 12/15/19  7:52 AM   Result Value Ref Range    Glucose 221 (H) 70 - 99 mg/dL   Glucose by meter    Collection Time: 12/15/19  4:45 PM   Result Value Ref Range    Glucose 280 (H) 70 - 99 mg/dL   Basic metabolic panel    Collection Time: 12/16/19  6:53 AM   Result Value Ref Range    Sodium 138 133 - 144 mmol/L    Potassium 4.3 3.4 - 5.3 mmol/L    Chloride 106 94 - 109 mmol/L    Carbon Dioxide 30 20 - 32 mmol/L    Anion Gap 2 (L) 3 - 14 mmol/L    Glucose 220 (H) 70 - 99 mg/dL    Urea Nitrogen 37 (H) 7 - 30 mg/dL    Creatinine 1.07 (H) 0.52 - 1.04 mg/dL    GFR Estimate 45 (L) >60 mL/min/[1.73_m2]    GFR Estimate If Black 52 (L) >60 mL/min/[1.73_m2]    Calcium 8.7 8.5 - 10.1 mg/dL   Glucose by meter    Collection Time: 12/16/19  7:58 AM   Result Value Ref Range    Glucose 206 (H) 70 - 99 mg/dL   Glucose by meter    Collection Time: 12/16/19  8:18 PM   Result Value Ref Range    Glucose 268 (H) 70 - 99 mg/dL   Glucose by meter    Collection Time: 12/17/19  7:27 AM   Result Value Ref Range    Glucose 189 (H) 70 - 99 mg/dL   Glucose by meter    Collection Time: 12/17/19  4:48 PM   Result Value Ref Range    Glucose 242 (H) 70 - 99 mg/dL   Glucose by meter    Collection Time: 12/18/19  8:57 AM   Result Value Ref Range    Glucose 219 (H) 70 - 99 mg/dL   Glucose by meter    Collection Time: 12/18/19  5:18 PM   Result Value Ref Range    Glucose 216 (H) 70 - 99 mg/dL   Glucose by meter    Collection Time: 12/19/19  8:43 AM   Result Value Ref Range    Glucose 295 (H) 70 - 99 mg/dL   Glucose by meter    Collection Time: 12/19/19 12:11 PM   Result Value Ref Range    Glucose 160 (H) 70 - 99 mg/dL   Glucose by meter    Collection Time: 12/19/19  5:42 PM   Result Value Ref Range    Glucose 88 70 - 99 mg/dL   Glucose by meter    Collection Time: 12/20/19  7:59 AM   Result Value Ref Range    Glucose 231 (H) 70 - 99 mg/dL   Glucose by  meter    Collection Time: 12/20/19  5:57 PM   Result Value Ref Range    Glucose 162 (H) 70 - 99 mg/dL   Glucose by meter    Collection Time: 12/21/19  8:47 AM   Result Value Ref Range    Glucose 188 (H) 70 - 99 mg/dL   Glucose by meter    Collection Time: 12/21/19  5:07 PM   Result Value Ref Range    Glucose 223 (H) 70 - 99 mg/dL   Glucose by meter    Collection Time: 12/22/19  8:56 AM   Result Value Ref Range    Glucose 160 (H) 70 - 99 mg/dL   Glucose by meter    Collection Time: 12/22/19  4:40 PM   Result Value Ref Range    Glucose 190 (H) 70 - 99 mg/dL   UA with Microscopic    Collection Time: 12/22/19  4:42 PM   Result Value Ref Range    Color Urine Light Yellow     Appearance Urine Clear     Glucose Urine Negative NEG^Negative mg/dL    Bilirubin Urine Negative NEG^Negative    Ketones Urine Negative NEG^Negative mg/dL    Specific Gravity Urine 1.013 1.003 - 1.035    Blood Urine Negative NEG^Negative    pH Urine 5.5 5.0 - 7.0 pH    Protein Albumin Urine Negative NEG^Negative mg/dL    Urobilinogen mg/dL Normal 0.0 - 2.0 mg/dL    Nitrite Urine Negative NEG^Negative    Leukocyte Esterase Urine Moderate (A) NEG^Negative    Source Unspecified Urine     WBC Urine 10 (H) 0 - 5 /HPF    RBC Urine 1 0 - 2 /HPF    Bacteria Urine Few (A) NEG^Negative /HPF    Squamous Epithelial /HPF Urine <1 0 - 1 /HPF    Mucous Urine Present (A) NEG^Negative /LPF   Urine Culture Aerobic Bacterial    Collection Time: 12/22/19  4:42 PM   Result Value Ref Range    Specimen Description Unspecified Urine     Special Requests Specimen received in preservative     Culture Micro       <10,000 colonies/mL  urogenital sincere  Susceptibility testing not routinely done     Glucose by meter    Collection Time: 12/23/19  9:06 AM   Result Value Ref Range    Glucose 182 (H) 70 - 99 mg/dL   Basic metabolic panel    Collection Time: 12/23/19 10:15 AM   Result Value Ref Range    Sodium 141 133 - 144 mmol/L    Potassium 4.0 3.4 - 5.3 mmol/L    Chloride 108 94 -  109 mmol/L    Carbon Dioxide 26 20 - 32 mmol/L    Anion Gap 7 3 - 14 mmol/L    Glucose 260 (H) 70 - 99 mg/dL    Urea Nitrogen 32 (H) 7 - 30 mg/dL    Creatinine 1.15 (H) 0.52 - 1.04 mg/dL    GFR Estimate 41 (L) >60 mL/min/[1.73_m2]    GFR Estimate If Black 48 (L) >60 mL/min/[1.73_m2]    Calcium 9.2 8.5 - 10.1 mg/dL   Glucose by meter    Collection Time: 12/23/19  4:55 PM   Result Value Ref Range    Glucose 162 (H) 70 - 99 mg/dL   Basic metabolic panel    Collection Time: 12/24/19  7:47 AM   Result Value Ref Range    Sodium 140 133 - 144 mmol/L    Potassium 4.2 3.4 - 5.3 mmol/L    Chloride 107 94 - 109 mmol/L    Carbon Dioxide 26 20 - 32 mmol/L    Anion Gap 7 3 - 14 mmol/L    Glucose 218 (H) 70 - 99 mg/dL    Urea Nitrogen 30 7 - 30 mg/dL    Creatinine 1.01 0.52 - 1.04 mg/dL    GFR Estimate 48 (L) >60 mL/min/[1.73_m2]    GFR Estimate If Black 56 (L) >60 mL/min/[1.73_m2]    Calcium 8.5 8.5 - 10.1 mg/dL   Glucose by meter    Collection Time: 12/24/19  7:56 AM   Result Value Ref Range    Glucose 209 (H) 70 - 99 mg/dL   Glucose by meter    Collection Time: 12/24/19  5:09 PM   Result Value Ref Range    Glucose 210 (H) 70 - 99 mg/dL   Glucose by meter    Collection Time: 12/25/19  7:38 AM   Result Value Ref Range    Glucose 182 (H) 70 - 99 mg/dL   Basic metabolic panel    Collection Time: 12/25/19  7:40 AM   Result Value Ref Range    Sodium 138 133 - 144 mmol/L    Potassium 4.2 3.4 - 5.3 mmol/L    Chloride 106 94 - 109 mmol/L    Carbon Dioxide 26 20 - 32 mmol/L    Anion Gap 6 3 - 14 mmol/L    Glucose 185 (H) 70 - 99 mg/dL    Urea Nitrogen 28 7 - 30 mg/dL    Creatinine 1.03 0.52 - 1.04 mg/dL    GFR Estimate 47 (L) >60 mL/min/[1.73_m2]    GFR Estimate If Black 54 (L) >60 mL/min/[1.73_m2]    Calcium 8.9 8.5 - 10.1 mg/dL   Glucose by meter    Collection Time: 12/25/19  4:58 PM   Result Value Ref Range    Glucose 134 (H) 70 - 99 mg/dL   Basic metabolic panel    Collection Time: 12/26/19  6:38 AM   Result Value Ref Range    Sodium  139 133 - 144 mmol/L    Potassium 4.3 3.4 - 5.3 mmol/L    Chloride 107 94 - 109 mmol/L    Carbon Dioxide 28 20 - 32 mmol/L    Anion Gap 4 3 - 14 mmol/L    Glucose 185 (H) 70 - 99 mg/dL    Urea Nitrogen 30 7 - 30 mg/dL    Creatinine 1.09 (H) 0.52 - 1.04 mg/dL    GFR Estimate 44 (L) >60 mL/min/[1.73_m2]    GFR Estimate If Black 51 (L) >60 mL/min/[1.73_m2]    Calcium 8.9 8.5 - 10.1 mg/dL   Glucose by meter    Collection Time: 12/26/19  6:26 PM   Result Value Ref Range    Glucose 151 (H) 70 - 99 mg/dL   Glucose by meter    Collection Time: 12/27/19  9:02 AM   Result Value Ref Range    Glucose 156 (H) 70 - 99 mg/dL   Glucose by meter    Collection Time: 12/27/19  5:30 PM   Result Value Ref Range    Glucose 181 (H) 70 - 99 mg/dL   Basic metabolic panel    Collection Time: 12/28/19  6:30 AM   Result Value Ref Range    Sodium 139 133 - 144 mmol/L    Potassium 4.0 3.4 - 5.3 mmol/L    Chloride 106 94 - 109 mmol/L    Carbon Dioxide 28 20 - 32 mmol/L    Anion Gap 5 3 - 14 mmol/L    Glucose 215 (H) 70 - 99 mg/dL    Urea Nitrogen 30 7 - 30 mg/dL    Creatinine 1.04 0.52 - 1.04 mg/dL    GFR Estimate 46 (L) >60 mL/min/[1.73_m2]    GFR Estimate If Black 54 (L) >60 mL/min/[1.73_m2]    Calcium 8.9 8.5 - 10.1 mg/dL   Glucose by meter    Collection Time: 12/28/19  7:52 AM   Result Value Ref Range    Glucose 195 (H) 70 - 99 mg/dL   Glucose by meter    Collection Time: 12/28/19  4:06 PM   Result Value Ref Range    Glucose 345 (H) 70 - 99 mg/dL   Glucose by meter    Collection Time: 12/28/19  8:16 PM   Result Value Ref Range    Glucose 440 (H) 70 - 99 mg/dL   Glucose by meter    Collection Time: 12/28/19  9:58 PM   Result Value Ref Range    Glucose 308 (H) 70 - 99 mg/dL   Glucose by meter    Collection Time: 12/29/19  1:05 AM   Result Value Ref Range    Glucose 158 (H) 70 - 99 mg/dL   Glucose by meter    Collection Time: 12/29/19  8:03 AM   Result Value Ref Range    Glucose 268 (H) 70 - 99 mg/dL   Glucose by meter    Collection Time:  12/29/19 11:18 AM   Result Value Ref Range    Glucose 322 (H) 70 - 99 mg/dL   Glucose by meter    Collection Time: 12/29/19 12:02 PM   Result Value Ref Range    Glucose 288 (H) 70 - 99 mg/dL   Glucose by meter    Collection Time: 12/29/19  3:51 PM   Result Value Ref Range    Glucose 203 (H) 70 - 99 mg/dL   Glucose by meter    Collection Time: 12/29/19  8:16 PM   Result Value Ref Range    Glucose 268 (H) 70 - 99 mg/dL   Glucose by meter    Collection Time: 12/30/19  8:19 AM   Result Value Ref Range    Glucose 140 (H) 70 - 99 mg/dL   Glucose by meter    Collection Time: 12/30/19 12:11 PM   Result Value Ref Range    Glucose 255 (H) 70 - 99 mg/dL   Glucose by meter    Collection Time: 12/30/19  4:54 PM   Result Value Ref Range    Glucose 91 70 - 99 mg/dL   Glucose by meter    Collection Time: 12/30/19  9:25 PM   Result Value Ref Range    Glucose 240 (H) 70 - 99 mg/dL   Glucose by meter    Collection Time: 12/31/19  8:06 AM   Result Value Ref Range    Glucose 141 (H) 70 - 99 mg/dL     Results for orders placed or performed during the hospital encounter of 12/06/19   US Lower Extremity Venous Duplex Left    Narrative    EXAMINATION: DOPPLER VENOUS ULTRASOUND OF THE LEFT LOWER EXTREMITY,  12/23/2019 4:39 PM     COMPARISON: 6/18/2014    HISTORY: Left leg/foot swelling    TECHNIQUE:  Gray-scale evaluation with compression, spectral flow, and  color Doppler assessment of the deep venous system of the left leg  from groin to knee, and then at the ankle.    FINDINGS:  In the left lower extremity, the common femoral, femoral, popliteal  and posterior tibial veins demonstrate normal compressibility and  blood flow.      Impression    IMPRESSION: No evidence left lower extremity deep venous thrombosis.    I have personally reviewed the examination and initial interpretation  and I agree with the findings.    ANITRA VARGAS MD               Consults:   Consult Date:  12/07/2019      HISTORY OF PRESENT ILLNESS:  The patient is a  89-year-old female with reported history of schizophrenia, admitted to [facility] for severe psychiatric decompensation, reportedly due to her behavioral dysregulation.  Her family with whom she lives is not able to care for her and patient needs 24/7 supervision.  An Internal Medicine consultation was ordered by Dr. Davis to assess medical problems including type 2 diabetes.  At this time, Michaela is very hard of hearing so communication is via typing on a computer.  When asked if the patient has any out of the ordinary physical issues she wants to discuss, the patient declines and states she feels good.      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     Schizophrenia (H)       Tremor              Past Surgical History   Past Surgical History:   Procedure Laterality Date     COLONOSCOPY N/A 6/4/2018     Procedure: COLONOSCOPY;  colonoscopy;  Surgeon: Andres Casarez MD;  Location: [facility]     DILATION AND CURETTAGE         ENT SURGERY         R ear     HC COLONOSCOPY THRU STOMA, DIAGNOSTIC   04/23/2001     HC REMOVE TONSILS/ADENOIDS,<11 Y/O         HEMORRHOIDECTOMY         IMPLANT PACEMAKER   03/17/2014     symptomatic bradycardia     MASTECTOMY, MOD RADICAL (ANY)   1989     BILATERAL     PHACOEMULSIFICATION WITH STANDARD INTRAOCULAR LENS IMPLANT Left 5/4/2015     Procedure: PHACOEMULSIFICATION WITH STANDARD INTRAOCULAR LENS IMPLANT;  Surgeon: Naif Campbell MD;  Location: [facility]     PHACOEMULSIFICATION WITH STANDARD INTRAOCULAR LENS IMPLANT Right 8/13/2015     Procedure: PHACOEMULSIFICATION WITH STANDARD INTRAOCULAR LENS IMPLANT;  Surgeon: Naif Campbell MD;   Location: [facility]            ADMISSION MEDICATIONS:  Reviewed and listed in the medication reconciliation list.      ALLERGIES/ADVERSE EFFECTS:  STRAWBERRIES, SULFA AND ZOMIG.      Social History   Social History            Socioeconomic History     Marital status:        Spouse name: Not on file     Number of children: 2     Years of education: 14+     Highest education level: Not on file   Occupational History     Occupation:        Employer: RETIRED   Social Needs     Financial resource strain: Not on file     Food insecurity:       Worry: Not on file       Inability: Not on file     Transportation needs:       Medical: Not on file       Non-medical: Not on file   Tobacco Use     Smoking status: Former Smoker       Packs/day: 0.20       Years: 10.00       Pack years: 2.00       Types: Cigarettes       Last attempt to quit: 1980       Years since quittin.9     Smokeless tobacco: Never Used   Substance and Sexual Activity     Alcohol use: No     Drug use: No     Sexual activity: Not Currently   Lifestyle     Physical activity:       Days per week: Not on file       Minutes per session: Not on file     Stress: Not on file   Relationships     Social connections:       Talks on phone: Not on file       Gets together: Not on file       Attends Shinto service: Not on file       Active member of club or organization: Not on file       Attends meetings of clubs or organizations: Not on file       Relationship status: Not on file     Intimate partner violence:       Fear of current or ex partner: Not on file       Emotionally abused: Not on file       Physically abused: Not on file       Forced sexual activity: Not on file   Other Topics Concern      Service Not Asked     Blood Transfusions Not Asked     Caffeine Concern No       Comment: No caffiene     Occupational Exposure Not Asked     Hobby Hazards Not Asked     Sleep Concern Not Asked     Stress Concern Not Asked     Weight  Concern Not Asked     Special Diet Not Asked     Back Care Not Asked     Exercise Not Asked     Bike Helmet Not Asked     Seat Belt Yes     Self-Exams Not Asked     Parent/sibling w/ CABG, MI or angioplasty before 65F 55M? No   Social History Narrative     Not on file            Family History         Family History   Problem Relation Age of Onset     Neurologic Disorder Mother       Sudden Death Mother       Cancer Father           lung cancer,  68     Musculoskeletal Disorder Father           deaf     Cerebrovascular Disease Paternal Grandfather           3 strokes            REVIEW OF SYSTEMS:  Ten-point review of systems negative except as stated above in history of present illness.      PHYSICAL EXAMINATION:   GENERAL:  Pleasant lady in no acute distress.   VITAL SIGNS:  Stable.  Temperature afebrile, pulse in the 60s, blood pressure 160/82, oxygen saturation 96% on room air.   HEENT:  Negative.   NECK:  Supple.  No cervical lymphadenopathy or thyromegaly.   LUNGS:  Clear.   CARDIOVASCULAR:  Regular rate and rhythm.   ABDOMEN:  Soft, nontender.   EXTREMITIES:  No edema.   SKIN:  No acute rash on exposed areas.   NEUROLOGIC:  She is awake, very hard of hearing.  Motor strength is weak but symmetric.  Gait deferred.      LABORATORY DATA:  Creatinine 1.16, GFR 41, glucose range since admission 114-176, otherwise CBC normal and rest of comprehensive metabolic panel unremarkable.  Hemoglobin A1c 7.5%.  Urinalysis unremarkable.  Urine culture with mixed urogenital sincere.  CT head without contrast reveals a possible colloid cyst, otherwise unremarkable.  EKG:  Ventricular pacemaker, otherwise unremarkable.      ASSESSMENT:   1.  Decompensating behavioral concerns in patient with reported history of schizophrenia per Dr. Davis.   2.  Type 2 diabetes, on Metformin prior to admission; however, currently contraindicated based on her creatinine clearance per discussion with Pharmacy.  Sugars since admission under fair  control.   3.  Chronic kidney disease with baseline creatinine approximately 1.1-1.2, likely due to her longstanding history of diabetes.   4.  Heart failure with preserved ejection fraction, appears to be well compensated at this time on prior to admission Lasix.   5.  Hypertension, stable with current pressures elevated mostly due to anxiety and not receiving her usual prior to admission Lasix this morning.   6.  History of complete heart block, status post cardiac pacemaker placement in 2014 for which her pacemaker was recently interrogated and deemed to be functioning normally this past October.   7.  Severe hard of hearing.      PLAN:  Will discontinue Metformin and start glipizide at low dose of 2.5 mg daily and titrate up as indicated.  Accu-Cheks will be monitored b.i.d.  Hypoglycemic protocol will be instituted.  Continue with prior to admission Lasix 40 mg daily.  Daily weights.  No further medical intervention indicated at this time.  Medicine will continue to follow.  Please feel free to call with questions.      Thank you for this consultation.     CANDIE CARTAGENA PA-C       Podiatry Consult:  Consult Date:  12/09/2019      SUBJECTIVE:  A 89-year-old female consulted to Podiatry by VIV Lyn, CNP, for toenail care in a diabetic.  Patient seen at bedside with nursing.  She has a history of diabetes.  She relates she needs her toenails cut and they hurt some times.        OBJECTIVE:  DP and PT are 1/4 bilaterally.  She has peripheral edema bilaterally.  Decreased hair growth bilaterally.  No erythema, no drainage, no odor, and no calor bilaterally.  She has dystrophic, thickened, brittle nails with incurvation, subungual debris, dystrophy, and discoloration of the hallux nails bilaterally, minimally the lesser nails bilaterally.  CFT is less than 3 seconds bilaterally.        ASSESSMENT AND PLAN:   Onychomycosis and onychauxis bilaterally.  She is diabetic with peripheral vascular  disease.  Diagnosis and treatment discussed with her.  All the nails were debrided and reduced bilaterally upon consent.  Will follow as needed.         AMANDA CARROLL DPM         Hospital Course:   Michaela Soria was admitted to Senior Unit with attending Thai Pro MD as a voluntary patient. The patient was placed under status 15 (15 minute checks) to ensure patient safety. The patient was receptive to referral to nursing facility. She was discharged to [facility] after [facility] [employer] funding approved.  Patient  did not require seclusion or administration of emergency medications to manage behavior.     The following medication changes took place:   -- Librium was discontinued and placed on Benzodiazepine withdrawal protocol with Phenobarb taper. Phenobarb taper completed uneventfully.   -- Prolixin 0.5 mg, bid continued.   -- PRN Hydroxyzine, Trazodone.     The patient tolerated medications well. Reported mood symptoms resolved. The patient was active on the unit. The patient was social, engaged and attended groups. No overt sugar noted and delusional thoughts subsided. The patient exhibited bout of mild confusion and disorientation but gradually subsided. The patient maintained denial of SI, HI and ASHER. The patient slept well. Appetite was intact. The patient was compliant with medications and care.     Michaela Soria was released to [facility]. At the time of this encounter, Michaela Soria was determined to not be a danger to herself or others and symptoms did not meet criteria for involuntary hospitalization.  The patient denied depression, anxiety, racing thoughts and irritability. The patient denied hallucinations and paranoia. The patient was future oriented and denied SI, ASHER and HI. The patient noted tolerating medications well.    Steps taken to minimize risk include: assessing patient s behavior and thought process daily during hospital stay, discharging patient with adequate plan for  follow up for mental and physical health, and discussing safety plan of returning to the hospital or calling 911, should the patient ever has thoughts of harming self or others. Therefore, based on all available evidence including the factors cited above, the patient does not appear to be at imminent risk for self-harm, and is appropriate for outpatient level of care.  The patient agreed to continue medications and outpatient care.          Discharge Medications:     Current Discharge Medication List      START taking these medications    Details   docusate sodium (COLACE) 100 MG capsule Take 1 capsule (100 mg) by mouth 2 times daily  Qty: 60 capsule, Refills: 0      glipiZIDE (GLUCOTROL) 5 MG tablet Take 1.5 tablets (7.5 mg) by mouth 2 times daily (before meals)    Associated Diagnoses: Type 2 diabetes mellitus without complication, unspecified whether long term insulin use (H)      hydrOXYzine (ATARAX) 25 MG tablet Take 1 tablet (25 mg) by mouth every 4 hours as needed for anxiety  Qty: 10 tablet, Refills: 0      !! insulin aspart (NOVOLOG PEN) 100 UNIT/ML pen Inject 1-7 Units Subcutaneous 3 times daily (before meals) Correction Scale - MEDIUM INSULIN RESISTANCE DOSING     Do Not give Correction Insulin if Pre-Meal BG less than 140.   For Pre-Meal  - 189 give 1 unit.   For Pre-Meal  - 239 give 2 units.   For Pre-Meal  - 289 give 3 units.   For Pre-Meal  - 339 give 4 units.   For Pre-Meal - 399 give 5 units.   For Pre-Meal -449 give 6 units  For Pre-Meal BG greater than or equal to 450 give 7 units.   To be given with prandial insulin, and based on pre-meal blood glucose.    Notify provider if glucose greater than or equal to 350 mg/dL after administration of correction dose. If given at mealtime, administer within 30 minutes of start of meal    Associated Diagnoses: Type 2 diabetes mellitus without complication, unspecified whether long term insulin use (H)      !! insulin  aspart (NOVOLOG PEN) 100 UNIT/ML pen Inject 1-5 Units Subcutaneous At Bedtime MEDIUM INSULIN RESISTANCE DOSING    Do Not give Bedtime Correction Insulin if BG less than  200.   For  - 249 give 1 units.   For  - 299 give 2 units.   For  - 349 give 3 units.   For  -399 give 4 units.   For BG greater than or equal to 400 give 5 units.  Notify provider if glucose greater than or equal to 350 mg/dL after administration of correction dose. If given at mealtime, administer within 30 minutes of start of meal    Associated Diagnoses: Type 2 diabetes mellitus without complication, unspecified whether long term insulin use (H)      lisinopril (PRINIVIL/ZESTRIL) 20 MG tablet Take 1 tablet (20 mg) by mouth daily Hold for systolic blood pressure <110    Associated Diagnoses: Essential hypertension, benign      pramox-pe-glycerin-petrolatum (PREPARATION H) 1-0.25-14.4-15 % CREA cream Place rectally 3 times daily as needed for hemorrhoids      traZODone (DESYREL) 50 MG tablet Take 1 tablet (50 mg) by mouth nightly as needed for sleep      witch hazel-glycerin (TUCKS) pad Apply topically every hour as needed for hemorrhoids       !! - Potential duplicate medications found. Please discuss with provider.      CONTINUE these medications which have CHANGED    Details   fluPHENAZine (PROLIXIN) 1 MG tablet Take 0.5 tablets (0.5 mg) by mouth 2 times daily  Qty: 30 tablet, Refills: 0      furosemide (LASIX) 40 MG tablet Take 1 tablet (40 mg) by mouth daily Hold for systolic blood pressure <110  Qty: 90 tablet, Refills: 0    Associated Diagnoses: Essential hypertension, benign         CONTINUE these medications which have NOT CHANGED    Details   atorvastatin (LIPITOR) 20 MG tablet Take 1 tablet (20 mg) by mouth daily (Needs fasting lab work)  Qty: 90 tablet, Refills: 1    Associated Diagnoses: Hyperlipidemia LDL goal <100         STOP taking these medications       chlordiazePOXIDE (LIBRIUM) 10 MG capsule Comments:    Reason for Stopping:         ferrous gluconate (FERGON) 324 (38 Fe) MG tablet Comments:   Reason for Stopping:         metFORMIN (GLUCOPHAGE) 500 MG tablet Comments:   Reason for Stopping:                    Psychiatric and Physical Examinations:   Appearance:  awake, alert, adequately groomed and no apparent distress  Attitude:  cooperative  Eye Contact:  good  Mood:  better  Affect:  appropriate and in normal range and reactive  Speech:  clear, coherent, normal prosody and soft  Psychomotor Behavior:  tremor observed   Thought Process:  linear and goal oriented  Associations:  no loose associations  Thought Content:  no evidence of suicidal ideation or homicidal ideation and no evidence of psychotic thought  Insight:  fair  Judgment:  intact  Oriented to:  partial  Attention Span and Concentration:  fair  Recent and Remote Memory:  fair  Language and Fund of Knowledge: appropriate  Muscle Strength and Tone: normal    Vitals:    12/30/19 0823 12/30/19 1108 12/30/19 2026 12/31/19 0901   BP: (!) 155/76 137/78 138/63 (!) 176/89   BP Location: Right arm Right arm     Pulse: 73 73 75    Resp:  16 16 16   Temp: 98.8  F (37.1  C) 96.8  F (36  C) 99.5  F (37.5  C) 97.7  F (36.5  C)   TempSrc: Oral Tympanic Tympanic Tympanic   SpO2: 94% 97% 94% 96%   Weight: 48.8 kg (107 lb 9.6 oz)      Height:              Discharge Plan:     The [address] [address]   Phone: [phone]    Health Care Follow-up Appointments:   PCP-   Ruddy Whitten - Tuesday, January 14th at 2:25pm  [place], [place]   [phone].    Resources:   Crisis Intervention: 635.510.5720 or 265-944-6939 (TTY: 199.328.3608).  Call anytime for help.  National [place] on Mental Illness (www.[place].s[place].org): 947.113.8617 or 301-610-9696.  Suicide Awareness Voices of Education (SAVE) (www.save.org): 335-434-ENER (0584)  National Suicide Prevention Line (www.mentalhealthmn.org): 056-854-GYNI  (5392)  Mental Health Consumer/Survivor Network of [place] (www.mhcsn.net): 625-013-0733 or 780-515-3882  Mental Health Association of [place] (www.mentalhealth.org): 715.811.9220 or 655-305-4777  Self- Management and Recovery Training., SMART-- Toll free: 943.540.5374  www.[employer]ockeTalk.Reaching Our Outdoor Friends (ROOF)  [facility] Crisis (COPE) Response - Adult [phone]  [facility] Crisis Response - Adult [phone]    Attestation:  The patient has been seen and evaluated by me,  Thai Pro MD

## 2020-04-05 NOTE — ED NOTES
Patient lives with son she comes in today c/o cough, phelgm and diarrhea. She is very [place]   0 Hour(s) 27 Minute(s)

## 2021-02-25 ENCOUNTER — ANCILLARY PROCEDURE (OUTPATIENT)
Dept: CARDIOLOGY | Facility: CLINIC | Age: 86
End: 2021-02-25
Attending: INTERNAL MEDICINE
Payer: MEDICARE

## 2021-02-25 DIAGNOSIS — I44.30 AV BLOCK: ICD-10-CM

## 2021-02-25 PROCEDURE — 93296 REM INTERROG EVL PM/IDS: CPT

## 2021-02-25 PROCEDURE — 93294 REM INTERROG EVL PM/LDLS PM: CPT | Performed by: INTERNAL MEDICINE

## 2021-03-12 LAB
MDC_IDC_EPISODE_DTM: NORMAL
MDC_IDC_EPISODE_DURATION: 2001 S
MDC_IDC_EPISODE_DURATION: 2008 S
MDC_IDC_EPISODE_DURATION: 2840 S
MDC_IDC_EPISODE_DURATION: 57 S
MDC_IDC_EPISODE_DURATION: 576 S
MDC_IDC_EPISODE_DURATION: 60 S
MDC_IDC_EPISODE_DURATION: 60 S
MDC_IDC_EPISODE_DURATION: 611 S
MDC_IDC_EPISODE_DURATION: 665 S
MDC_IDC_EPISODE_DURATION: 7338 S
MDC_IDC_EPISODE_DURATION: 745 S
MDC_IDC_EPISODE_DURATION: 782 S
MDC_IDC_EPISODE_DURATION: 87 S
MDC_IDC_EPISODE_DURATION: 978 S
MDC_IDC_EPISODE_DURATION: NORMAL S
MDC_IDC_EPISODE_ID: 1037
MDC_IDC_EPISODE_ID: 1038
MDC_IDC_EPISODE_ID: 1039
MDC_IDC_EPISODE_ID: 1040
MDC_IDC_EPISODE_ID: 1041
MDC_IDC_EPISODE_ID: 1042
MDC_IDC_EPISODE_ID: 1043
MDC_IDC_EPISODE_ID: 1044
MDC_IDC_EPISODE_ID: 1045
MDC_IDC_EPISODE_ID: 1046
MDC_IDC_EPISODE_ID: 1047
MDC_IDC_EPISODE_ID: 1048
MDC_IDC_EPISODE_ID: 1049
MDC_IDC_EPISODE_ID: 1050
MDC_IDC_EPISODE_ID: 1051
MDC_IDC_EPISODE_TYPE: NORMAL
MDC_IDC_LEAD_IMPLANT_DT: NORMAL
MDC_IDC_LEAD_IMPLANT_DT: NORMAL
MDC_IDC_LEAD_LOCATION: NORMAL
MDC_IDC_LEAD_LOCATION: NORMAL
MDC_IDC_LEAD_LOCATION_DETAIL_1: NORMAL
MDC_IDC_LEAD_LOCATION_DETAIL_1: NORMAL
MDC_IDC_LEAD_MFG: NORMAL
MDC_IDC_LEAD_MFG: NORMAL
MDC_IDC_LEAD_MODEL: NORMAL
MDC_IDC_LEAD_MODEL: NORMAL
MDC_IDC_LEAD_POLARITY_TYPE: NORMAL
MDC_IDC_LEAD_POLARITY_TYPE: NORMAL
MDC_IDC_LEAD_SERIAL: NORMAL
MDC_IDC_LEAD_SERIAL: NORMAL
MDC_IDC_MSMT_BATTERY_DTM: NORMAL
MDC_IDC_MSMT_BATTERY_REMAINING_LONGEVITY: 31 MO
MDC_IDC_MSMT_BATTERY_RRT_TRIGGER: 2.83
MDC_IDC_MSMT_BATTERY_STATUS: NORMAL
MDC_IDC_MSMT_BATTERY_VOLTAGE: 2.94 V
MDC_IDC_MSMT_LEADCHNL_RA_IMPEDANCE_VALUE: 399 OHM
MDC_IDC_MSMT_LEADCHNL_RA_IMPEDANCE_VALUE: 418 OHM
MDC_IDC_MSMT_LEADCHNL_RA_PACING_THRESHOLD_AMPLITUDE: 0.5 V
MDC_IDC_MSMT_LEADCHNL_RA_PACING_THRESHOLD_PULSEWIDTH: 0.4 MS
MDC_IDC_MSMT_LEADCHNL_RA_SENSING_INTR_AMPL: 0.38 MV
MDC_IDC_MSMT_LEADCHNL_RA_SENSING_INTR_AMPL: 0.38 MV
MDC_IDC_MSMT_LEADCHNL_RV_IMPEDANCE_VALUE: 437 OHM
MDC_IDC_MSMT_LEADCHNL_RV_IMPEDANCE_VALUE: 494 OHM
MDC_IDC_MSMT_LEADCHNL_RV_PACING_THRESHOLD_AMPLITUDE: 0.75 V
MDC_IDC_MSMT_LEADCHNL_RV_PACING_THRESHOLD_PULSEWIDTH: 0.4 MS
MDC_IDC_MSMT_LEADCHNL_RV_SENSING_INTR_AMPL: 7.88 MV
MDC_IDC_MSMT_LEADCHNL_RV_SENSING_INTR_AMPL: 7.88 MV
MDC_IDC_PG_IMPLANT_DTM: NORMAL
MDC_IDC_PG_MFG: NORMAL
MDC_IDC_PG_MODEL: NORMAL
MDC_IDC_PG_SERIAL: NORMAL
MDC_IDC_PG_TYPE: NORMAL
MDC_IDC_SESS_CLINIC_NAME: NORMAL
MDC_IDC_SESS_DTM: NORMAL
MDC_IDC_SESS_TYPE: NORMAL
MDC_IDC_SET_BRADY_AT_MODE_SWITCH_RATE: 171 {BEATS}/MIN
MDC_IDC_SET_BRADY_HYSTRATE: NORMAL
MDC_IDC_SET_BRADY_LOWRATE: 60 {BEATS}/MIN
MDC_IDC_SET_BRADY_MAX_SENSOR_RATE: 120 {BEATS}/MIN
MDC_IDC_SET_BRADY_MAX_TRACKING_RATE: 120 {BEATS}/MIN
MDC_IDC_SET_BRADY_MODE: NORMAL
MDC_IDC_SET_BRADY_PAV_DELAY_LOW: 180 MS
MDC_IDC_SET_BRADY_SAV_DELAY_LOW: 150 MS
MDC_IDC_SET_LEADCHNL_RA_PACING_AMPLITUDE: 1.5 V
MDC_IDC_SET_LEADCHNL_RA_PACING_ANODE_ELECTRODE_1: NORMAL
MDC_IDC_SET_LEADCHNL_RA_PACING_ANODE_LOCATION_1: NORMAL
MDC_IDC_SET_LEADCHNL_RA_PACING_CAPTURE_MODE: NORMAL
MDC_IDC_SET_LEADCHNL_RA_PACING_CATHODE_ELECTRODE_1: NORMAL
MDC_IDC_SET_LEADCHNL_RA_PACING_CATHODE_LOCATION_1: NORMAL
MDC_IDC_SET_LEADCHNL_RA_PACING_POLARITY: NORMAL
MDC_IDC_SET_LEADCHNL_RA_PACING_PULSEWIDTH: 0.4 MS
MDC_IDC_SET_LEADCHNL_RA_SENSING_ANODE_ELECTRODE_1: NORMAL
MDC_IDC_SET_LEADCHNL_RA_SENSING_ANODE_LOCATION_1: NORMAL
MDC_IDC_SET_LEADCHNL_RA_SENSING_CATHODE_ELECTRODE_1: NORMAL
MDC_IDC_SET_LEADCHNL_RA_SENSING_CATHODE_LOCATION_1: NORMAL
MDC_IDC_SET_LEADCHNL_RA_SENSING_POLARITY: NORMAL
MDC_IDC_SET_LEADCHNL_RA_SENSING_SENSITIVITY: 0.3 MV
MDC_IDC_SET_LEADCHNL_RV_PACING_AMPLITUDE: 2 V
MDC_IDC_SET_LEADCHNL_RV_PACING_ANODE_ELECTRODE_1: NORMAL
MDC_IDC_SET_LEADCHNL_RV_PACING_ANODE_LOCATION_1: NORMAL
MDC_IDC_SET_LEADCHNL_RV_PACING_CAPTURE_MODE: NORMAL
MDC_IDC_SET_LEADCHNL_RV_PACING_CATHODE_ELECTRODE_1: NORMAL
MDC_IDC_SET_LEADCHNL_RV_PACING_CATHODE_LOCATION_1: NORMAL
MDC_IDC_SET_LEADCHNL_RV_PACING_POLARITY: NORMAL
MDC_IDC_SET_LEADCHNL_RV_PACING_PULSEWIDTH: 0.4 MS
MDC_IDC_SET_LEADCHNL_RV_SENSING_ANODE_ELECTRODE_1: NORMAL
MDC_IDC_SET_LEADCHNL_RV_SENSING_ANODE_LOCATION_1: NORMAL
MDC_IDC_SET_LEADCHNL_RV_SENSING_CATHODE_ELECTRODE_1: NORMAL
MDC_IDC_SET_LEADCHNL_RV_SENSING_CATHODE_LOCATION_1: NORMAL
MDC_IDC_SET_LEADCHNL_RV_SENSING_POLARITY: NORMAL
MDC_IDC_SET_LEADCHNL_RV_SENSING_SENSITIVITY: 0.9 MV
MDC_IDC_SET_ZONE_DETECTION_INTERVAL: 200 MS
MDC_IDC_SET_ZONE_DETECTION_INTERVAL: 350 MS
MDC_IDC_SET_ZONE_DETECTION_INTERVAL: 400 MS
MDC_IDC_SET_ZONE_TYPE: NORMAL
MDC_IDC_STAT_AT_BURDEN_PERCENT: 0.1 %
MDC_IDC_STAT_AT_DTM_END: NORMAL
MDC_IDC_STAT_AT_DTM_START: NORMAL
MDC_IDC_STAT_BRADY_AP_VP_PERCENT: 21.03 %
MDC_IDC_STAT_BRADY_AP_VS_PERCENT: 1.53 %
MDC_IDC_STAT_BRADY_AS_VP_PERCENT: 75.7 %
MDC_IDC_STAT_BRADY_AS_VS_PERCENT: 1.74 %
MDC_IDC_STAT_BRADY_DTM_END: NORMAL
MDC_IDC_STAT_BRADY_DTM_START: NORMAL
MDC_IDC_STAT_BRADY_RA_PERCENT_PACED: 22.51 %
MDC_IDC_STAT_BRADY_RV_PERCENT_PACED: 96.65 %
MDC_IDC_STAT_EPISODE_RECENT_COUNT: 0
MDC_IDC_STAT_EPISODE_RECENT_COUNT: 0
MDC_IDC_STAT_EPISODE_RECENT_COUNT: 1
MDC_IDC_STAT_EPISODE_RECENT_COUNT: 93
MDC_IDC_STAT_EPISODE_RECENT_COUNT_DTM_END: NORMAL
MDC_IDC_STAT_EPISODE_RECENT_COUNT_DTM_START: NORMAL
MDC_IDC_STAT_EPISODE_TOTAL_COUNT: 0
MDC_IDC_STAT_EPISODE_TOTAL_COUNT: 0
MDC_IDC_STAT_EPISODE_TOTAL_COUNT: 1
MDC_IDC_STAT_EPISODE_TOTAL_COUNT: 1536
MDC_IDC_STAT_EPISODE_TOTAL_COUNT_DTM_END: NORMAL
MDC_IDC_STAT_EPISODE_TOTAL_COUNT_DTM_START: NORMAL
MDC_IDC_STAT_EPISODE_TYPE: NORMAL

## 2021-06-08 ENCOUNTER — ANCILLARY PROCEDURE (OUTPATIENT)
Dept: CARDIOLOGY | Facility: CLINIC | Age: 86
End: 2021-06-08
Attending: INTERNAL MEDICINE
Payer: MEDICARE

## 2021-06-08 DIAGNOSIS — I44.30 AV BLOCK: ICD-10-CM

## 2021-06-08 PROCEDURE — 93296 REM INTERROG EVL PM/IDS: CPT

## 2021-06-08 PROCEDURE — 93294 REM INTERROG EVL PM/LDLS PM: CPT | Performed by: INTERNAL MEDICINE

## 2021-06-24 LAB
MDC_IDC_EPISODE_DTM: NORMAL
MDC_IDC_EPISODE_DURATION: 1000 S
MDC_IDC_EPISODE_DURATION: 101 S
MDC_IDC_EPISODE_DURATION: 1039 S
MDC_IDC_EPISODE_DURATION: 1061 S
MDC_IDC_EPISODE_DURATION: 108 S
MDC_IDC_EPISODE_DURATION: 111 S
MDC_IDC_EPISODE_DURATION: 1113 S
MDC_IDC_EPISODE_DURATION: 1162 S
MDC_IDC_EPISODE_DURATION: 117 S
MDC_IDC_EPISODE_DURATION: 119 S
MDC_IDC_EPISODE_DURATION: 1237 S
MDC_IDC_EPISODE_DURATION: 125 S
MDC_IDC_EPISODE_DURATION: 1271 S
MDC_IDC_EPISODE_DURATION: 129 S
MDC_IDC_EPISODE_DURATION: 131 S
MDC_IDC_EPISODE_DURATION: 1316 S
MDC_IDC_EPISODE_DURATION: 135 S
MDC_IDC_EPISODE_DURATION: 136 S
MDC_IDC_EPISODE_DURATION: 136 S
MDC_IDC_EPISODE_DURATION: 137 S
MDC_IDC_EPISODE_DURATION: 138 S
MDC_IDC_EPISODE_DURATION: 1403 S
MDC_IDC_EPISODE_DURATION: 141 S
MDC_IDC_EPISODE_DURATION: 144 S
MDC_IDC_EPISODE_DURATION: 145 S
MDC_IDC_EPISODE_DURATION: 147 S
MDC_IDC_EPISODE_DURATION: 148 S
MDC_IDC_EPISODE_DURATION: 1497 S
MDC_IDC_EPISODE_DURATION: 151 S
MDC_IDC_EPISODE_DURATION: 154 S
MDC_IDC_EPISODE_DURATION: 155 S
MDC_IDC_EPISODE_DURATION: 1555 S
MDC_IDC_EPISODE_DURATION: 156 S
MDC_IDC_EPISODE_DURATION: 158 S
MDC_IDC_EPISODE_DURATION: 164 S
MDC_IDC_EPISODE_DURATION: 164 S
MDC_IDC_EPISODE_DURATION: 165 S
MDC_IDC_EPISODE_DURATION: 169 S
MDC_IDC_EPISODE_DURATION: 171 S
MDC_IDC_EPISODE_DURATION: 1722 S
MDC_IDC_EPISODE_DURATION: 173 S
MDC_IDC_EPISODE_DURATION: 177 S
MDC_IDC_EPISODE_DURATION: 1817 S
MDC_IDC_EPISODE_DURATION: 1826 S
MDC_IDC_EPISODE_DURATION: 186 S
MDC_IDC_EPISODE_DURATION: 187 S
MDC_IDC_EPISODE_DURATION: 188 S
MDC_IDC_EPISODE_DURATION: 189 S
MDC_IDC_EPISODE_DURATION: 195 S
MDC_IDC_EPISODE_DURATION: 198 S
MDC_IDC_EPISODE_DURATION: 203 S
MDC_IDC_EPISODE_DURATION: 205 S
MDC_IDC_EPISODE_DURATION: 205 S
MDC_IDC_EPISODE_DURATION: 208 S
MDC_IDC_EPISODE_DURATION: 216 S
MDC_IDC_EPISODE_DURATION: 218 S
MDC_IDC_EPISODE_DURATION: 2331 S
MDC_IDC_EPISODE_DURATION: 237 S
MDC_IDC_EPISODE_DURATION: 243 S
MDC_IDC_EPISODE_DURATION: 249 S
MDC_IDC_EPISODE_DURATION: 249 S
MDC_IDC_EPISODE_DURATION: 252 S
MDC_IDC_EPISODE_DURATION: 256 S
MDC_IDC_EPISODE_DURATION: 2560 S
MDC_IDC_EPISODE_DURATION: 282 S
MDC_IDC_EPISODE_DURATION: 297 S
MDC_IDC_EPISODE_DURATION: 30 S
MDC_IDC_EPISODE_DURATION: 30 S
MDC_IDC_EPISODE_DURATION: 304 S
MDC_IDC_EPISODE_DURATION: 304 S
MDC_IDC_EPISODE_DURATION: 31 S
MDC_IDC_EPISODE_DURATION: 3100 S
MDC_IDC_EPISODE_DURATION: 3197 S
MDC_IDC_EPISODE_DURATION: 322 S
MDC_IDC_EPISODE_DURATION: 33 S
MDC_IDC_EPISODE_DURATION: 333 S
MDC_IDC_EPISODE_DURATION: 333 S
MDC_IDC_EPISODE_DURATION: 343 S
MDC_IDC_EPISODE_DURATION: 360 S
MDC_IDC_EPISODE_DURATION: 3690 S
MDC_IDC_EPISODE_DURATION: 38 S
MDC_IDC_EPISODE_DURATION: 381 S
MDC_IDC_EPISODE_DURATION: 381 S
MDC_IDC_EPISODE_DURATION: 385 S
MDC_IDC_EPISODE_DURATION: 386 S
MDC_IDC_EPISODE_DURATION: 39 S
MDC_IDC_EPISODE_DURATION: 39 S
MDC_IDC_EPISODE_DURATION: 391 S
MDC_IDC_EPISODE_DURATION: 392 S
MDC_IDC_EPISODE_DURATION: 398 S
MDC_IDC_EPISODE_DURATION: 405 S
MDC_IDC_EPISODE_DURATION: 41 S
MDC_IDC_EPISODE_DURATION: 436 S
MDC_IDC_EPISODE_DURATION: 443 S
MDC_IDC_EPISODE_DURATION: 466 S
MDC_IDC_EPISODE_DURATION: 4672 S
MDC_IDC_EPISODE_DURATION: 47 S
MDC_IDC_EPISODE_DURATION: 47 S
MDC_IDC_EPISODE_DURATION: 484 S
MDC_IDC_EPISODE_DURATION: 485 S
MDC_IDC_EPISODE_DURATION: 51 S
MDC_IDC_EPISODE_DURATION: 51 S
MDC_IDC_EPISODE_DURATION: 52 S
MDC_IDC_EPISODE_DURATION: 522 S
MDC_IDC_EPISODE_DURATION: 527 S
MDC_IDC_EPISODE_DURATION: 54 S
MDC_IDC_EPISODE_DURATION: 54 S
MDC_IDC_EPISODE_DURATION: 547 S
MDC_IDC_EPISODE_DURATION: 55 S
MDC_IDC_EPISODE_DURATION: 563 S
MDC_IDC_EPISODE_DURATION: 568 S
MDC_IDC_EPISODE_DURATION: 57 S
MDC_IDC_EPISODE_DURATION: 5755 S
MDC_IDC_EPISODE_DURATION: 58 S
MDC_IDC_EPISODE_DURATION: 59 S
MDC_IDC_EPISODE_DURATION: 591 S
MDC_IDC_EPISODE_DURATION: 5933 S
MDC_IDC_EPISODE_DURATION: 65 S
MDC_IDC_EPISODE_DURATION: 659 S
MDC_IDC_EPISODE_DURATION: 66 S
MDC_IDC_EPISODE_DURATION: 66 S
MDC_IDC_EPISODE_DURATION: 6796 S
MDC_IDC_EPISODE_DURATION: 68 S
MDC_IDC_EPISODE_DURATION: 68 S
MDC_IDC_EPISODE_DURATION: 69 S
MDC_IDC_EPISODE_DURATION: 73 S
MDC_IDC_EPISODE_DURATION: 74 S
MDC_IDC_EPISODE_DURATION: 75 S
MDC_IDC_EPISODE_DURATION: 76 S
MDC_IDC_EPISODE_DURATION: 80 S
MDC_IDC_EPISODE_DURATION: 803 S
MDC_IDC_EPISODE_DURATION: 81 S
MDC_IDC_EPISODE_DURATION: 83 S
MDC_IDC_EPISODE_DURATION: 855 S
MDC_IDC_EPISODE_DURATION: 898 S
MDC_IDC_EPISODE_DURATION: 90 S
MDC_IDC_EPISODE_DURATION: 92 S
MDC_IDC_EPISODE_DURATION: 96 S
MDC_IDC_EPISODE_DURATION: 960 S
MDC_IDC_EPISODE_DURATION: 97 S
MDC_IDC_EPISODE_DURATION: NORMAL S
MDC_IDC_EPISODE_ID: 1058
MDC_IDC_EPISODE_ID: 1060
MDC_IDC_EPISODE_ID: 1083
MDC_IDC_EPISODE_ID: 1084
MDC_IDC_EPISODE_ID: 1085
MDC_IDC_EPISODE_ID: 1086
MDC_IDC_EPISODE_ID: 1087
MDC_IDC_EPISODE_ID: 1088
MDC_IDC_EPISODE_ID: 1089
MDC_IDC_EPISODE_ID: 1090
MDC_IDC_EPISODE_ID: 1091
MDC_IDC_EPISODE_ID: 1092
MDC_IDC_EPISODE_ID: 1093
MDC_IDC_EPISODE_ID: 1094
MDC_IDC_EPISODE_ID: 1095
MDC_IDC_EPISODE_ID: 1096
MDC_IDC_EPISODE_ID: 1097
MDC_IDC_EPISODE_ID: 1098
MDC_IDC_EPISODE_ID: 1099
MDC_IDC_EPISODE_ID: 1100
MDC_IDC_EPISODE_ID: 1101
MDC_IDC_EPISODE_ID: 1102
MDC_IDC_EPISODE_ID: 1103
MDC_IDC_EPISODE_ID: 1104
MDC_IDC_EPISODE_ID: 1105
MDC_IDC_EPISODE_ID: 1106
MDC_IDC_EPISODE_ID: 1107
MDC_IDC_EPISODE_ID: 1108
MDC_IDC_EPISODE_ID: 1109
MDC_IDC_EPISODE_ID: 1110
MDC_IDC_EPISODE_ID: 1111
MDC_IDC_EPISODE_ID: 1112
MDC_IDC_EPISODE_ID: 1113
MDC_IDC_EPISODE_ID: 1114
MDC_IDC_EPISODE_ID: 1115
MDC_IDC_EPISODE_ID: 1116
MDC_IDC_EPISODE_ID: 1117
MDC_IDC_EPISODE_ID: 1118
MDC_IDC_EPISODE_ID: 1119
MDC_IDC_EPISODE_ID: 1120
MDC_IDC_EPISODE_ID: 1121
MDC_IDC_EPISODE_ID: 1122
MDC_IDC_EPISODE_ID: 1123
MDC_IDC_EPISODE_ID: 1124
MDC_IDC_EPISODE_ID: 1125
MDC_IDC_EPISODE_ID: 1126
MDC_IDC_EPISODE_ID: 1127
MDC_IDC_EPISODE_ID: 1128
MDC_IDC_EPISODE_ID: 1129
MDC_IDC_EPISODE_ID: 1130
MDC_IDC_EPISODE_ID: 1131
MDC_IDC_EPISODE_ID: 1132
MDC_IDC_EPISODE_ID: 1133
MDC_IDC_EPISODE_ID: 1134
MDC_IDC_EPISODE_ID: 1135
MDC_IDC_EPISODE_ID: 1136
MDC_IDC_EPISODE_ID: 1137
MDC_IDC_EPISODE_ID: 1138
MDC_IDC_EPISODE_ID: 1139
MDC_IDC_EPISODE_ID: 1140
MDC_IDC_EPISODE_ID: 1141
MDC_IDC_EPISODE_ID: 1142
MDC_IDC_EPISODE_ID: 1143
MDC_IDC_EPISODE_ID: 1144
MDC_IDC_EPISODE_ID: 1145
MDC_IDC_EPISODE_ID: 1146
MDC_IDC_EPISODE_ID: 1147
MDC_IDC_EPISODE_ID: 1148
MDC_IDC_EPISODE_ID: 1149
MDC_IDC_EPISODE_ID: 1150
MDC_IDC_EPISODE_ID: 1151
MDC_IDC_EPISODE_ID: 1152
MDC_IDC_EPISODE_ID: 1153
MDC_IDC_EPISODE_ID: 1154
MDC_IDC_EPISODE_ID: 1155
MDC_IDC_EPISODE_ID: 1156
MDC_IDC_EPISODE_ID: 1157
MDC_IDC_EPISODE_ID: 1158
MDC_IDC_EPISODE_ID: 1159
MDC_IDC_EPISODE_ID: 1160
MDC_IDC_EPISODE_ID: 1161
MDC_IDC_EPISODE_ID: 1162
MDC_IDC_EPISODE_ID: 1163
MDC_IDC_EPISODE_ID: 1164
MDC_IDC_EPISODE_ID: 1165
MDC_IDC_EPISODE_ID: 1166
MDC_IDC_EPISODE_ID: 1167
MDC_IDC_EPISODE_ID: 1168
MDC_IDC_EPISODE_ID: 1169
MDC_IDC_EPISODE_ID: 1170
MDC_IDC_EPISODE_ID: 1171
MDC_IDC_EPISODE_ID: 1172
MDC_IDC_EPISODE_ID: 1173
MDC_IDC_EPISODE_ID: 1174
MDC_IDC_EPISODE_ID: 1175
MDC_IDC_EPISODE_ID: 1176
MDC_IDC_EPISODE_ID: 1177
MDC_IDC_EPISODE_ID: 1178
MDC_IDC_EPISODE_ID: 1179
MDC_IDC_EPISODE_ID: 1180
MDC_IDC_EPISODE_ID: 1181
MDC_IDC_EPISODE_ID: 1182
MDC_IDC_EPISODE_ID: 1183
MDC_IDC_EPISODE_ID: 1184
MDC_IDC_EPISODE_ID: 1185
MDC_IDC_EPISODE_ID: 1186
MDC_IDC_EPISODE_ID: 1187
MDC_IDC_EPISODE_ID: 1188
MDC_IDC_EPISODE_ID: 1189
MDC_IDC_EPISODE_ID: 1190
MDC_IDC_EPISODE_ID: 1191
MDC_IDC_EPISODE_ID: 1192
MDC_IDC_EPISODE_ID: 1193
MDC_IDC_EPISODE_ID: 1194
MDC_IDC_EPISODE_ID: 1195
MDC_IDC_EPISODE_ID: 1196
MDC_IDC_EPISODE_ID: 1197
MDC_IDC_EPISODE_ID: 1198
MDC_IDC_EPISODE_ID: 1199
MDC_IDC_EPISODE_ID: 1200
MDC_IDC_EPISODE_ID: 1201
MDC_IDC_EPISODE_ID: 1202
MDC_IDC_EPISODE_ID: 1203
MDC_IDC_EPISODE_ID: 1204
MDC_IDC_EPISODE_ID: 1205
MDC_IDC_EPISODE_ID: 1206
MDC_IDC_EPISODE_ID: 1207
MDC_IDC_EPISODE_ID: 1208
MDC_IDC_EPISODE_ID: 1209
MDC_IDC_EPISODE_ID: 1210
MDC_IDC_EPISODE_ID: 1211
MDC_IDC_EPISODE_ID: 1212
MDC_IDC_EPISODE_ID: 1213
MDC_IDC_EPISODE_ID: 1214
MDC_IDC_EPISODE_ID: 1215
MDC_IDC_EPISODE_ID: 1216
MDC_IDC_EPISODE_ID: 1217
MDC_IDC_EPISODE_ID: 1218
MDC_IDC_EPISODE_ID: 1219
MDC_IDC_EPISODE_ID: 1220
MDC_IDC_EPISODE_ID: 1221
MDC_IDC_EPISODE_ID: 1222
MDC_IDC_EPISODE_ID: 1223
MDC_IDC_EPISODE_ID: 1224
MDC_IDC_EPISODE_ID: 1225
MDC_IDC_EPISODE_ID: 1226
MDC_IDC_EPISODE_ID: 1227
MDC_IDC_EPISODE_ID: 1228
MDC_IDC_EPISODE_TYPE: NORMAL
MDC_IDC_LEAD_IMPLANT_DT: NORMAL
MDC_IDC_LEAD_IMPLANT_DT: NORMAL
MDC_IDC_LEAD_LOCATION: NORMAL
MDC_IDC_LEAD_LOCATION: NORMAL
MDC_IDC_LEAD_LOCATION_DETAIL_1: NORMAL
MDC_IDC_LEAD_LOCATION_DETAIL_1: NORMAL
MDC_IDC_LEAD_MFG: NORMAL
MDC_IDC_LEAD_MFG: NORMAL
MDC_IDC_LEAD_MODEL: NORMAL
MDC_IDC_LEAD_MODEL: NORMAL
MDC_IDC_LEAD_POLARITY_TYPE: NORMAL
MDC_IDC_LEAD_POLARITY_TYPE: NORMAL
MDC_IDC_LEAD_SERIAL: NORMAL
MDC_IDC_LEAD_SERIAL: NORMAL
MDC_IDC_MSMT_BATTERY_DTM: NORMAL
MDC_IDC_MSMT_BATTERY_REMAINING_LONGEVITY: 28 MO
MDC_IDC_MSMT_BATTERY_RRT_TRIGGER: 2.83
MDC_IDC_MSMT_BATTERY_STATUS: NORMAL
MDC_IDC_MSMT_BATTERY_VOLTAGE: 2.94 V
MDC_IDC_MSMT_LEADCHNL_RA_IMPEDANCE_VALUE: 399 OHM
MDC_IDC_MSMT_LEADCHNL_RA_IMPEDANCE_VALUE: 418 OHM
MDC_IDC_MSMT_LEADCHNL_RA_PACING_THRESHOLD_AMPLITUDE: 0.5 V
MDC_IDC_MSMT_LEADCHNL_RA_PACING_THRESHOLD_PULSEWIDTH: 0.4 MS
MDC_IDC_MSMT_LEADCHNL_RA_SENSING_INTR_AMPL: 0.75 MV
MDC_IDC_MSMT_LEADCHNL_RA_SENSING_INTR_AMPL: 0.75 MV
MDC_IDC_MSMT_LEADCHNL_RV_IMPEDANCE_VALUE: 456 OHM
MDC_IDC_MSMT_LEADCHNL_RV_IMPEDANCE_VALUE: 513 OHM
MDC_IDC_MSMT_LEADCHNL_RV_PACING_THRESHOLD_AMPLITUDE: 0.88 V
MDC_IDC_MSMT_LEADCHNL_RV_PACING_THRESHOLD_PULSEWIDTH: 0.4 MS
MDC_IDC_MSMT_LEADCHNL_RV_SENSING_INTR_AMPL: 7.25 MV
MDC_IDC_MSMT_LEADCHNL_RV_SENSING_INTR_AMPL: 7.25 MV
MDC_IDC_PG_IMPLANT_DTM: NORMAL
MDC_IDC_PG_MFG: NORMAL
MDC_IDC_PG_MODEL: NORMAL
MDC_IDC_PG_SERIAL: NORMAL
MDC_IDC_PG_TYPE: NORMAL
MDC_IDC_SESS_CLINIC_NAME: NORMAL
MDC_IDC_SESS_DTM: NORMAL
MDC_IDC_SESS_TYPE: NORMAL
MDC_IDC_SET_BRADY_AT_MODE_SWITCH_RATE: 171 {BEATS}/MIN
MDC_IDC_SET_BRADY_HYSTRATE: NORMAL
MDC_IDC_SET_BRADY_LOWRATE: 60 {BEATS}/MIN
MDC_IDC_SET_BRADY_MAX_SENSOR_RATE: 120 {BEATS}/MIN
MDC_IDC_SET_BRADY_MAX_TRACKING_RATE: 120 {BEATS}/MIN
MDC_IDC_SET_BRADY_MODE: NORMAL
MDC_IDC_SET_BRADY_PAV_DELAY_LOW: 180 MS
MDC_IDC_SET_BRADY_SAV_DELAY_LOW: 150 MS
MDC_IDC_SET_LEADCHNL_RA_PACING_AMPLITUDE: 1.5 V
MDC_IDC_SET_LEADCHNL_RA_PACING_ANODE_ELECTRODE_1: NORMAL
MDC_IDC_SET_LEADCHNL_RA_PACING_ANODE_LOCATION_1: NORMAL
MDC_IDC_SET_LEADCHNL_RA_PACING_CAPTURE_MODE: NORMAL
MDC_IDC_SET_LEADCHNL_RA_PACING_CATHODE_ELECTRODE_1: NORMAL
MDC_IDC_SET_LEADCHNL_RA_PACING_CATHODE_LOCATION_1: NORMAL
MDC_IDC_SET_LEADCHNL_RA_PACING_POLARITY: NORMAL
MDC_IDC_SET_LEADCHNL_RA_PACING_PULSEWIDTH: 0.4 MS
MDC_IDC_SET_LEADCHNL_RA_SENSING_ANODE_ELECTRODE_1: NORMAL
MDC_IDC_SET_LEADCHNL_RA_SENSING_ANODE_LOCATION_1: NORMAL
MDC_IDC_SET_LEADCHNL_RA_SENSING_CATHODE_ELECTRODE_1: NORMAL
MDC_IDC_SET_LEADCHNL_RA_SENSING_CATHODE_LOCATION_1: NORMAL
MDC_IDC_SET_LEADCHNL_RA_SENSING_POLARITY: NORMAL
MDC_IDC_SET_LEADCHNL_RA_SENSING_SENSITIVITY: 0.3 MV
MDC_IDC_SET_LEADCHNL_RV_PACING_AMPLITUDE: 2 V
MDC_IDC_SET_LEADCHNL_RV_PACING_ANODE_ELECTRODE_1: NORMAL
MDC_IDC_SET_LEADCHNL_RV_PACING_ANODE_LOCATION_1: NORMAL
MDC_IDC_SET_LEADCHNL_RV_PACING_CAPTURE_MODE: NORMAL
MDC_IDC_SET_LEADCHNL_RV_PACING_CATHODE_ELECTRODE_1: NORMAL
MDC_IDC_SET_LEADCHNL_RV_PACING_CATHODE_LOCATION_1: NORMAL
MDC_IDC_SET_LEADCHNL_RV_PACING_POLARITY: NORMAL
MDC_IDC_SET_LEADCHNL_RV_PACING_PULSEWIDTH: 0.4 MS
MDC_IDC_SET_LEADCHNL_RV_SENSING_ANODE_ELECTRODE_1: NORMAL
MDC_IDC_SET_LEADCHNL_RV_SENSING_ANODE_LOCATION_1: NORMAL
MDC_IDC_SET_LEADCHNL_RV_SENSING_CATHODE_ELECTRODE_1: NORMAL
MDC_IDC_SET_LEADCHNL_RV_SENSING_CATHODE_LOCATION_1: NORMAL
MDC_IDC_SET_LEADCHNL_RV_SENSING_POLARITY: NORMAL
MDC_IDC_SET_LEADCHNL_RV_SENSING_SENSITIVITY: 0.9 MV
MDC_IDC_SET_ZONE_DETECTION_INTERVAL: 200 MS
MDC_IDC_SET_ZONE_DETECTION_INTERVAL: 350 MS
MDC_IDC_SET_ZONE_DETECTION_INTERVAL: 400 MS
MDC_IDC_SET_ZONE_TYPE: NORMAL
MDC_IDC_STAT_AT_BURDEN_PERCENT: 2.7 %
MDC_IDC_STAT_AT_DTM_END: NORMAL
MDC_IDC_STAT_AT_DTM_START: NORMAL
MDC_IDC_STAT_BRADY_AP_VP_PERCENT: 43.45 %
MDC_IDC_STAT_BRADY_AP_VS_PERCENT: 0 %
MDC_IDC_STAT_BRADY_AS_VP_PERCENT: 56.47 %
MDC_IDC_STAT_BRADY_AS_VS_PERCENT: 0.08 %
MDC_IDC_STAT_BRADY_DTM_END: NORMAL
MDC_IDC_STAT_BRADY_DTM_START: NORMAL
MDC_IDC_STAT_BRADY_RA_PERCENT_PACED: 42.92 %
MDC_IDC_STAT_BRADY_RV_PERCENT_PACED: 99.87 %
MDC_IDC_STAT_EPISODE_RECENT_COUNT: 0
MDC_IDC_STAT_EPISODE_RECENT_COUNT: 177
MDC_IDC_STAT_EPISODE_RECENT_COUNT_DTM_END: NORMAL
MDC_IDC_STAT_EPISODE_RECENT_COUNT_DTM_START: NORMAL
MDC_IDC_STAT_EPISODE_TOTAL_COUNT: 0
MDC_IDC_STAT_EPISODE_TOTAL_COUNT: 0
MDC_IDC_STAT_EPISODE_TOTAL_COUNT: 1
MDC_IDC_STAT_EPISODE_TOTAL_COUNT: 1842
MDC_IDC_STAT_EPISODE_TOTAL_COUNT_DTM_END: NORMAL
MDC_IDC_STAT_EPISODE_TOTAL_COUNT_DTM_START: NORMAL
MDC_IDC_STAT_EPISODE_TYPE: NORMAL

## 2021-09-28 ENCOUNTER — ANCILLARY PROCEDURE (OUTPATIENT)
Dept: CARDIOLOGY | Facility: CLINIC | Age: 86
End: 2021-09-28
Attending: INTERNAL MEDICINE
Payer: MEDICARE

## 2021-09-28 DIAGNOSIS — I44.30 AV BLOCK: ICD-10-CM

## 2021-09-28 PROCEDURE — 93294 REM INTERROG EVL PM/LDLS PM: CPT | Performed by: INTERNAL MEDICINE

## 2021-09-28 PROCEDURE — 93296 REM INTERROG EVL PM/IDS: CPT

## 2021-10-15 LAB
MDC_IDC_EPISODE_DTM: NORMAL
MDC_IDC_EPISODE_DURATION: 100 S
MDC_IDC_EPISODE_DURATION: 100 S
MDC_IDC_EPISODE_DURATION: 103 S
MDC_IDC_EPISODE_DURATION: 107 S
MDC_IDC_EPISODE_DURATION: 111 S
MDC_IDC_EPISODE_DURATION: 1149 S
MDC_IDC_EPISODE_DURATION: 116 S
MDC_IDC_EPISODE_DURATION: 121 S
MDC_IDC_EPISODE_DURATION: 122 S
MDC_IDC_EPISODE_DURATION: 136 S
MDC_IDC_EPISODE_DURATION: 141 S
MDC_IDC_EPISODE_DURATION: 154 S
MDC_IDC_EPISODE_DURATION: 1569 S
MDC_IDC_EPISODE_DURATION: 157 S
MDC_IDC_EPISODE_DURATION: 173 S
MDC_IDC_EPISODE_DURATION: 1827 S
MDC_IDC_EPISODE_DURATION: 185 S
MDC_IDC_EPISODE_DURATION: 189 S
MDC_IDC_EPISODE_DURATION: 2070 S
MDC_IDC_EPISODE_DURATION: 225 S
MDC_IDC_EPISODE_DURATION: 243 S
MDC_IDC_EPISODE_DURATION: 266 S
MDC_IDC_EPISODE_DURATION: 271 S
MDC_IDC_EPISODE_DURATION: 271 S
MDC_IDC_EPISODE_DURATION: 293 S
MDC_IDC_EPISODE_DURATION: 313 S
MDC_IDC_EPISODE_DURATION: 3151 S
MDC_IDC_EPISODE_DURATION: 318 S
MDC_IDC_EPISODE_DURATION: 319 S
MDC_IDC_EPISODE_DURATION: 321 S
MDC_IDC_EPISODE_DURATION: 326 S
MDC_IDC_EPISODE_DURATION: 3293 S
MDC_IDC_EPISODE_DURATION: 362 S
MDC_IDC_EPISODE_DURATION: 363 S
MDC_IDC_EPISODE_DURATION: 364 S
MDC_IDC_EPISODE_DURATION: 373 S
MDC_IDC_EPISODE_DURATION: 382 S
MDC_IDC_EPISODE_DURATION: 398 S
MDC_IDC_EPISODE_DURATION: 399 S
MDC_IDC_EPISODE_DURATION: 40 S
MDC_IDC_EPISODE_DURATION: 4137 S
MDC_IDC_EPISODE_DURATION: 455 S
MDC_IDC_EPISODE_DURATION: 4701 S
MDC_IDC_EPISODE_DURATION: 48 S
MDC_IDC_EPISODE_DURATION: 494 S
MDC_IDC_EPISODE_DURATION: 50 S
MDC_IDC_EPISODE_DURATION: 52 S
MDC_IDC_EPISODE_DURATION: 57 S
MDC_IDC_EPISODE_DURATION: 60 S
MDC_IDC_EPISODE_DURATION: 62 S
MDC_IDC_EPISODE_DURATION: 630 S
MDC_IDC_EPISODE_DURATION: 6829 S
MDC_IDC_EPISODE_DURATION: 683 S
MDC_IDC_EPISODE_DURATION: 69 S
MDC_IDC_EPISODE_DURATION: 74 S
MDC_IDC_EPISODE_DURATION: 7506 S
MDC_IDC_EPISODE_DURATION: 829 S
MDC_IDC_EPISODE_DURATION: 83 S
MDC_IDC_EPISODE_DURATION: 84 S
MDC_IDC_EPISODE_DURATION: 86 S
MDC_IDC_EPISODE_DURATION: 862 S
MDC_IDC_EPISODE_DURATION: 89 S
MDC_IDC_EPISODE_DURATION: 89 S
MDC_IDC_EPISODE_DURATION: 895 S
MDC_IDC_EPISODE_DURATION: 9 S
MDC_IDC_EPISODE_DURATION: 92 S
MDC_IDC_EPISODE_DURATION: 92 S
MDC_IDC_EPISODE_DURATION: 928 S
MDC_IDC_EPISODE_DURATION: 94 S
MDC_IDC_EPISODE_DURATION: 963 S
MDC_IDC_EPISODE_DURATION: 99 S
MDC_IDC_EPISODE_ID: 1229
MDC_IDC_EPISODE_ID: 1230
MDC_IDC_EPISODE_ID: 1231
MDC_IDC_EPISODE_ID: 1232
MDC_IDC_EPISODE_ID: 1233
MDC_IDC_EPISODE_ID: 1234
MDC_IDC_EPISODE_ID: 1235
MDC_IDC_EPISODE_ID: 1236
MDC_IDC_EPISODE_ID: 1237
MDC_IDC_EPISODE_ID: 1238
MDC_IDC_EPISODE_ID: 1239
MDC_IDC_EPISODE_ID: 1240
MDC_IDC_EPISODE_ID: 1241
MDC_IDC_EPISODE_ID: 1242
MDC_IDC_EPISODE_ID: 1243
MDC_IDC_EPISODE_ID: 1244
MDC_IDC_EPISODE_ID: 1245
MDC_IDC_EPISODE_ID: 1246
MDC_IDC_EPISODE_ID: 1247
MDC_IDC_EPISODE_ID: 1248
MDC_IDC_EPISODE_ID: 1249
MDC_IDC_EPISODE_ID: 1250
MDC_IDC_EPISODE_ID: 1251
MDC_IDC_EPISODE_ID: 1252
MDC_IDC_EPISODE_ID: 1253
MDC_IDC_EPISODE_ID: 1254
MDC_IDC_EPISODE_ID: 1255
MDC_IDC_EPISODE_ID: 1256
MDC_IDC_EPISODE_ID: 1257
MDC_IDC_EPISODE_ID: 1258
MDC_IDC_EPISODE_ID: 1259
MDC_IDC_EPISODE_ID: 1260
MDC_IDC_EPISODE_ID: 1261
MDC_IDC_EPISODE_ID: 1262
MDC_IDC_EPISODE_ID: 1263
MDC_IDC_EPISODE_ID: 1264
MDC_IDC_EPISODE_ID: 1265
MDC_IDC_EPISODE_ID: 1266
MDC_IDC_EPISODE_ID: 1267
MDC_IDC_EPISODE_ID: 1268
MDC_IDC_EPISODE_ID: 1269
MDC_IDC_EPISODE_ID: 1270
MDC_IDC_EPISODE_ID: 1271
MDC_IDC_EPISODE_ID: 1272
MDC_IDC_EPISODE_ID: 1273
MDC_IDC_EPISODE_ID: 1274
MDC_IDC_EPISODE_ID: 1275
MDC_IDC_EPISODE_ID: 1276
MDC_IDC_EPISODE_ID: 1277
MDC_IDC_EPISODE_ID: 1278
MDC_IDC_EPISODE_ID: 1279
MDC_IDC_EPISODE_ID: 1280
MDC_IDC_EPISODE_ID: 1281
MDC_IDC_EPISODE_ID: 1282
MDC_IDC_EPISODE_ID: 1283
MDC_IDC_EPISODE_ID: 1284
MDC_IDC_EPISODE_ID: 1285
MDC_IDC_EPISODE_ID: 1286
MDC_IDC_EPISODE_ID: 1287
MDC_IDC_EPISODE_ID: 1288
MDC_IDC_EPISODE_ID: 1289
MDC_IDC_EPISODE_ID: 1290
MDC_IDC_EPISODE_ID: 1291
MDC_IDC_EPISODE_ID: 1292
MDC_IDC_EPISODE_ID: 1293
MDC_IDC_EPISODE_ID: 1294
MDC_IDC_EPISODE_ID: 1295
MDC_IDC_EPISODE_ID: 1296
MDC_IDC_EPISODE_ID: 1297
MDC_IDC_EPISODE_ID: 1298
MDC_IDC_EPISODE_ID: 1299
MDC_IDC_EPISODE_TYPE: NORMAL
MDC_IDC_LEAD_IMPLANT_DT: NORMAL
MDC_IDC_LEAD_IMPLANT_DT: NORMAL
MDC_IDC_LEAD_LOCATION: NORMAL
MDC_IDC_LEAD_LOCATION: NORMAL
MDC_IDC_LEAD_LOCATION_DETAIL_1: NORMAL
MDC_IDC_LEAD_LOCATION_DETAIL_1: NORMAL
MDC_IDC_LEAD_MFG: NORMAL
MDC_IDC_LEAD_MFG: NORMAL
MDC_IDC_LEAD_MODEL: NORMAL
MDC_IDC_LEAD_MODEL: NORMAL
MDC_IDC_LEAD_POLARITY_TYPE: NORMAL
MDC_IDC_LEAD_POLARITY_TYPE: NORMAL
MDC_IDC_LEAD_SERIAL: NORMAL
MDC_IDC_LEAD_SERIAL: NORMAL
MDC_IDC_MSMT_BATTERY_DTM: NORMAL
MDC_IDC_MSMT_BATTERY_REMAINING_LONGEVITY: 25 MO
MDC_IDC_MSMT_BATTERY_RRT_TRIGGER: 2.83
MDC_IDC_MSMT_BATTERY_STATUS: NORMAL
MDC_IDC_MSMT_BATTERY_VOLTAGE: 2.92 V
MDC_IDC_MSMT_LEADCHNL_RA_IMPEDANCE_VALUE: 399 OHM
MDC_IDC_MSMT_LEADCHNL_RA_IMPEDANCE_VALUE: 437 OHM
MDC_IDC_MSMT_LEADCHNL_RA_PACING_THRESHOLD_AMPLITUDE: 0.62 V
MDC_IDC_MSMT_LEADCHNL_RA_PACING_THRESHOLD_PULSEWIDTH: 0.4 MS
MDC_IDC_MSMT_LEADCHNL_RA_SENSING_INTR_AMPL: 0.88 MV
MDC_IDC_MSMT_LEADCHNL_RA_SENSING_INTR_AMPL: 0.88 MV
MDC_IDC_MSMT_LEADCHNL_RV_IMPEDANCE_VALUE: 494 OHM
MDC_IDC_MSMT_LEADCHNL_RV_IMPEDANCE_VALUE: 551 OHM
MDC_IDC_MSMT_LEADCHNL_RV_PACING_THRESHOLD_AMPLITUDE: 0.75 V
MDC_IDC_MSMT_LEADCHNL_RV_PACING_THRESHOLD_PULSEWIDTH: 0.4 MS
MDC_IDC_MSMT_LEADCHNL_RV_SENSING_INTR_AMPL: 10.12 MV
MDC_IDC_MSMT_LEADCHNL_RV_SENSING_INTR_AMPL: 10.12 MV
MDC_IDC_PG_IMPLANT_DTM: NORMAL
MDC_IDC_PG_MFG: NORMAL
MDC_IDC_PG_MODEL: NORMAL
MDC_IDC_PG_SERIAL: NORMAL
MDC_IDC_PG_TYPE: NORMAL
MDC_IDC_SESS_CLINIC_NAME: NORMAL
MDC_IDC_SESS_DTM: NORMAL
MDC_IDC_SESS_TYPE: NORMAL
MDC_IDC_SET_BRADY_AT_MODE_SWITCH_RATE: 171 {BEATS}/MIN
MDC_IDC_SET_BRADY_HYSTRATE: NORMAL
MDC_IDC_SET_BRADY_LOWRATE: 60 {BEATS}/MIN
MDC_IDC_SET_BRADY_MAX_SENSOR_RATE: 120 {BEATS}/MIN
MDC_IDC_SET_BRADY_MAX_TRACKING_RATE: 120 {BEATS}/MIN
MDC_IDC_SET_BRADY_MODE: NORMAL
MDC_IDC_SET_BRADY_PAV_DELAY_LOW: 180 MS
MDC_IDC_SET_BRADY_SAV_DELAY_LOW: 150 MS
MDC_IDC_SET_LEADCHNL_RA_PACING_AMPLITUDE: 1.5 V
MDC_IDC_SET_LEADCHNL_RA_PACING_ANODE_ELECTRODE_1: NORMAL
MDC_IDC_SET_LEADCHNL_RA_PACING_ANODE_LOCATION_1: NORMAL
MDC_IDC_SET_LEADCHNL_RA_PACING_CAPTURE_MODE: NORMAL
MDC_IDC_SET_LEADCHNL_RA_PACING_CATHODE_ELECTRODE_1: NORMAL
MDC_IDC_SET_LEADCHNL_RA_PACING_CATHODE_LOCATION_1: NORMAL
MDC_IDC_SET_LEADCHNL_RA_PACING_POLARITY: NORMAL
MDC_IDC_SET_LEADCHNL_RA_PACING_PULSEWIDTH: 0.4 MS
MDC_IDC_SET_LEADCHNL_RA_SENSING_ANODE_ELECTRODE_1: NORMAL
MDC_IDC_SET_LEADCHNL_RA_SENSING_ANODE_LOCATION_1: NORMAL
MDC_IDC_SET_LEADCHNL_RA_SENSING_CATHODE_ELECTRODE_1: NORMAL
MDC_IDC_SET_LEADCHNL_RA_SENSING_CATHODE_LOCATION_1: NORMAL
MDC_IDC_SET_LEADCHNL_RA_SENSING_POLARITY: NORMAL
MDC_IDC_SET_LEADCHNL_RA_SENSING_SENSITIVITY: 0.3 MV
MDC_IDC_SET_LEADCHNL_RV_PACING_AMPLITUDE: 2 V
MDC_IDC_SET_LEADCHNL_RV_PACING_ANODE_ELECTRODE_1: NORMAL
MDC_IDC_SET_LEADCHNL_RV_PACING_ANODE_LOCATION_1: NORMAL
MDC_IDC_SET_LEADCHNL_RV_PACING_CAPTURE_MODE: NORMAL
MDC_IDC_SET_LEADCHNL_RV_PACING_CATHODE_ELECTRODE_1: NORMAL
MDC_IDC_SET_LEADCHNL_RV_PACING_CATHODE_LOCATION_1: NORMAL
MDC_IDC_SET_LEADCHNL_RV_PACING_POLARITY: NORMAL
MDC_IDC_SET_LEADCHNL_RV_PACING_PULSEWIDTH: 0.4 MS
MDC_IDC_SET_LEADCHNL_RV_SENSING_ANODE_ELECTRODE_1: NORMAL
MDC_IDC_SET_LEADCHNL_RV_SENSING_ANODE_LOCATION_1: NORMAL
MDC_IDC_SET_LEADCHNL_RV_SENSING_CATHODE_ELECTRODE_1: NORMAL
MDC_IDC_SET_LEADCHNL_RV_SENSING_CATHODE_LOCATION_1: NORMAL
MDC_IDC_SET_LEADCHNL_RV_SENSING_POLARITY: NORMAL
MDC_IDC_SET_LEADCHNL_RV_SENSING_SENSITIVITY: 0.9 MV
MDC_IDC_SET_ZONE_DETECTION_INTERVAL: 200 MS
MDC_IDC_SET_ZONE_DETECTION_INTERVAL: 350 MS
MDC_IDC_SET_ZONE_DETECTION_INTERVAL: 400 MS
MDC_IDC_SET_ZONE_TYPE: NORMAL
MDC_IDC_STAT_AT_BURDEN_PERCENT: 0.6 %
MDC_IDC_STAT_AT_DTM_END: NORMAL
MDC_IDC_STAT_AT_DTM_START: NORMAL
MDC_IDC_STAT_BRADY_AP_VP_PERCENT: 71.58 %
MDC_IDC_STAT_BRADY_AP_VS_PERCENT: 0.02 %
MDC_IDC_STAT_BRADY_AS_VP_PERCENT: 28.36 %
MDC_IDC_STAT_BRADY_AS_VS_PERCENT: 0.04 %
MDC_IDC_STAT_BRADY_DTM_END: NORMAL
MDC_IDC_STAT_BRADY_DTM_START: NORMAL
MDC_IDC_STAT_BRADY_RA_PERCENT_PACED: 71.3 %
MDC_IDC_STAT_BRADY_RV_PERCENT_PACED: 99.73 %
MDC_IDC_STAT_EPISODE_RECENT_COUNT: 0
MDC_IDC_STAT_EPISODE_RECENT_COUNT: 71
MDC_IDC_STAT_EPISODE_RECENT_COUNT_DTM_END: NORMAL
MDC_IDC_STAT_EPISODE_RECENT_COUNT_DTM_START: NORMAL
MDC_IDC_STAT_EPISODE_TOTAL_COUNT: 0
MDC_IDC_STAT_EPISODE_TOTAL_COUNT: 0
MDC_IDC_STAT_EPISODE_TOTAL_COUNT: 1
MDC_IDC_STAT_EPISODE_TOTAL_COUNT: 1970
MDC_IDC_STAT_EPISODE_TOTAL_COUNT_DTM_END: NORMAL
MDC_IDC_STAT_EPISODE_TOTAL_COUNT_DTM_START: NORMAL
MDC_IDC_STAT_EPISODE_TYPE: NORMAL

## 2022-01-06 ENCOUNTER — ANCILLARY PROCEDURE (OUTPATIENT)
Dept: CARDIOLOGY | Facility: CLINIC | Age: 87
End: 2022-01-06
Attending: INTERNAL MEDICINE
Payer: MEDICARE

## 2022-01-06 DIAGNOSIS — I44.30 AV BLOCK: ICD-10-CM

## 2022-01-06 PROCEDURE — 93294 REM INTERROG EVL PM/LDLS PM: CPT | Performed by: INTERNAL MEDICINE

## 2022-01-06 PROCEDURE — 93296 REM INTERROG EVL PM/IDS: CPT

## 2022-02-11 LAB
MDC_IDC_EPISODE_DTM: NORMAL
MDC_IDC_EPISODE_DURATION: 1011 S
MDC_IDC_EPISODE_DURATION: 1051 S
MDC_IDC_EPISODE_DURATION: 109 S
MDC_IDC_EPISODE_DURATION: 1122 S
MDC_IDC_EPISODE_DURATION: 115 S
MDC_IDC_EPISODE_DURATION: 115 S
MDC_IDC_EPISODE_DURATION: 1155 S
MDC_IDC_EPISODE_DURATION: 1180 S
MDC_IDC_EPISODE_DURATION: 12 S
MDC_IDC_EPISODE_DURATION: 124 S
MDC_IDC_EPISODE_DURATION: 124 S
MDC_IDC_EPISODE_DURATION: 125 S
MDC_IDC_EPISODE_DURATION: 129 S
MDC_IDC_EPISODE_DURATION: 1292 S
MDC_IDC_EPISODE_DURATION: 1317 S
MDC_IDC_EPISODE_DURATION: 133 S
MDC_IDC_EPISODE_DURATION: 1370 S
MDC_IDC_EPISODE_DURATION: 1388 S
MDC_IDC_EPISODE_DURATION: 139 S
MDC_IDC_EPISODE_DURATION: 1454 S
MDC_IDC_EPISODE_DURATION: 149 S
MDC_IDC_EPISODE_DURATION: 152 S
MDC_IDC_EPISODE_DURATION: 1538 S
MDC_IDC_EPISODE_DURATION: 156 S
MDC_IDC_EPISODE_DURATION: 1574 S
MDC_IDC_EPISODE_DURATION: 1589 S
MDC_IDC_EPISODE_DURATION: 1599 S
MDC_IDC_EPISODE_DURATION: 16 S
MDC_IDC_EPISODE_DURATION: 161 S
MDC_IDC_EPISODE_DURATION: 1643 S
MDC_IDC_EPISODE_DURATION: 1672 S
MDC_IDC_EPISODE_DURATION: 168 S
MDC_IDC_EPISODE_DURATION: 174 S
MDC_IDC_EPISODE_DURATION: 1778 S
MDC_IDC_EPISODE_DURATION: 178 S
MDC_IDC_EPISODE_DURATION: 179 S
MDC_IDC_EPISODE_DURATION: 183 S
MDC_IDC_EPISODE_DURATION: 1901 S
MDC_IDC_EPISODE_DURATION: 204 S
MDC_IDC_EPISODE_DURATION: 2046 S
MDC_IDC_EPISODE_DURATION: 2071 S
MDC_IDC_EPISODE_DURATION: 208 S
MDC_IDC_EPISODE_DURATION: 209 S
MDC_IDC_EPISODE_DURATION: 213 S
MDC_IDC_EPISODE_DURATION: 2193 S
MDC_IDC_EPISODE_DURATION: 227 S
MDC_IDC_EPISODE_DURATION: 237 S
MDC_IDC_EPISODE_DURATION: 240 S
MDC_IDC_EPISODE_DURATION: 243 S
MDC_IDC_EPISODE_DURATION: 253 S
MDC_IDC_EPISODE_DURATION: 267 S
MDC_IDC_EPISODE_DURATION: 268 S
MDC_IDC_EPISODE_DURATION: 269 S
MDC_IDC_EPISODE_DURATION: 269 S
MDC_IDC_EPISODE_DURATION: 270 S
MDC_IDC_EPISODE_DURATION: 271 S
MDC_IDC_EPISODE_DURATION: 277 S
MDC_IDC_EPISODE_DURATION: 300 S
MDC_IDC_EPISODE_DURATION: 303 S
MDC_IDC_EPISODE_DURATION: 304 S
MDC_IDC_EPISODE_DURATION: 304 S
MDC_IDC_EPISODE_DURATION: 305 S
MDC_IDC_EPISODE_DURATION: 315 S
MDC_IDC_EPISODE_DURATION: 32 S
MDC_IDC_EPISODE_DURATION: 3369 S
MDC_IDC_EPISODE_DURATION: 337 S
MDC_IDC_EPISODE_DURATION: 34 S
MDC_IDC_EPISODE_DURATION: 34 S
MDC_IDC_EPISODE_DURATION: 343 S
MDC_IDC_EPISODE_DURATION: 344 S
MDC_IDC_EPISODE_DURATION: 345 S
MDC_IDC_EPISODE_DURATION: 368 S
MDC_IDC_EPISODE_DURATION: 37 S
MDC_IDC_EPISODE_DURATION: 37 S
MDC_IDC_EPISODE_DURATION: 38 S
MDC_IDC_EPISODE_DURATION: 387 S
MDC_IDC_EPISODE_DURATION: 39 S
MDC_IDC_EPISODE_DURATION: 390 S
MDC_IDC_EPISODE_DURATION: 394 S
MDC_IDC_EPISODE_DURATION: 3962 S
MDC_IDC_EPISODE_DURATION: 400 S
MDC_IDC_EPISODE_DURATION: 401 S
MDC_IDC_EPISODE_DURATION: 41 S
MDC_IDC_EPISODE_DURATION: 410 S
MDC_IDC_EPISODE_DURATION: 4190 S
MDC_IDC_EPISODE_DURATION: 43 S
MDC_IDC_EPISODE_DURATION: 44 S
MDC_IDC_EPISODE_DURATION: 44 S
MDC_IDC_EPISODE_DURATION: 45 S
MDC_IDC_EPISODE_DURATION: 4645 S
MDC_IDC_EPISODE_DURATION: 5047 S
MDC_IDC_EPISODE_DURATION: 5081 S
MDC_IDC_EPISODE_DURATION: 512 S
MDC_IDC_EPISODE_DURATION: 52 S
MDC_IDC_EPISODE_DURATION: 5251 S
MDC_IDC_EPISODE_DURATION: 53 S
MDC_IDC_EPISODE_DURATION: 53 S
MDC_IDC_EPISODE_DURATION: 5333 S
MDC_IDC_EPISODE_DURATION: 539 S
MDC_IDC_EPISODE_DURATION: 557 S
MDC_IDC_EPISODE_DURATION: 5777 S
MDC_IDC_EPISODE_DURATION: 5856 S
MDC_IDC_EPISODE_DURATION: 6018 S
MDC_IDC_EPISODE_DURATION: 61 S
MDC_IDC_EPISODE_DURATION: 64 S
MDC_IDC_EPISODE_DURATION: 704 S
MDC_IDC_EPISODE_DURATION: 736 S
MDC_IDC_EPISODE_DURATION: 776 S
MDC_IDC_EPISODE_DURATION: 78 S
MDC_IDC_EPISODE_DURATION: 811 S
MDC_IDC_EPISODE_DURATION: 8402 S
MDC_IDC_EPISODE_DURATION: 860 S
MDC_IDC_EPISODE_DURATION: 8814 S
MDC_IDC_EPISODE_DURATION: 893 S
MDC_IDC_EPISODE_DURATION: 91 S
MDC_IDC_EPISODE_DURATION: 96 S
MDC_IDC_EPISODE_DURATION: 961 S
MDC_IDC_EPISODE_DURATION: 97 S
MDC_IDC_EPISODE_DURATION: NORMAL S
MDC_IDC_EPISODE_DURATION: NORMAL S
MDC_IDC_EPISODE_ID: 1300
MDC_IDC_EPISODE_ID: 1301
MDC_IDC_EPISODE_ID: 1302
MDC_IDC_EPISODE_ID: 1303
MDC_IDC_EPISODE_ID: 1304
MDC_IDC_EPISODE_ID: 1305
MDC_IDC_EPISODE_ID: 1306
MDC_IDC_EPISODE_ID: 1307
MDC_IDC_EPISODE_ID: 1308
MDC_IDC_EPISODE_ID: 1309
MDC_IDC_EPISODE_ID: 1310
MDC_IDC_EPISODE_ID: 1311
MDC_IDC_EPISODE_ID: 1312
MDC_IDC_EPISODE_ID: 1313
MDC_IDC_EPISODE_ID: 1314
MDC_IDC_EPISODE_ID: 1315
MDC_IDC_EPISODE_ID: 1316
MDC_IDC_EPISODE_ID: 1317
MDC_IDC_EPISODE_ID: 1318
MDC_IDC_EPISODE_ID: 1319
MDC_IDC_EPISODE_ID: 1320
MDC_IDC_EPISODE_ID: 1321
MDC_IDC_EPISODE_ID: 1322
MDC_IDC_EPISODE_ID: 1323
MDC_IDC_EPISODE_ID: 1324
MDC_IDC_EPISODE_ID: 1325
MDC_IDC_EPISODE_ID: 1326
MDC_IDC_EPISODE_ID: 1327
MDC_IDC_EPISODE_ID: 1328
MDC_IDC_EPISODE_ID: 1329
MDC_IDC_EPISODE_ID: 1330
MDC_IDC_EPISODE_ID: 1331
MDC_IDC_EPISODE_ID: 1332
MDC_IDC_EPISODE_ID: 1333
MDC_IDC_EPISODE_ID: 1334
MDC_IDC_EPISODE_ID: 1335
MDC_IDC_EPISODE_ID: 1336
MDC_IDC_EPISODE_ID: 1337
MDC_IDC_EPISODE_ID: 1338
MDC_IDC_EPISODE_ID: 1339
MDC_IDC_EPISODE_ID: 1340
MDC_IDC_EPISODE_ID: 1341
MDC_IDC_EPISODE_ID: 1342
MDC_IDC_EPISODE_ID: 1343
MDC_IDC_EPISODE_ID: 1344
MDC_IDC_EPISODE_ID: 1345
MDC_IDC_EPISODE_ID: 1346
MDC_IDC_EPISODE_ID: 1347
MDC_IDC_EPISODE_ID: 1348
MDC_IDC_EPISODE_ID: 1349
MDC_IDC_EPISODE_ID: 1350
MDC_IDC_EPISODE_ID: 1351
MDC_IDC_EPISODE_ID: 1352
MDC_IDC_EPISODE_ID: 1353
MDC_IDC_EPISODE_ID: 1354
MDC_IDC_EPISODE_ID: 1355
MDC_IDC_EPISODE_ID: 1356
MDC_IDC_EPISODE_ID: 1357
MDC_IDC_EPISODE_ID: 1358
MDC_IDC_EPISODE_ID: 1359
MDC_IDC_EPISODE_ID: 1360
MDC_IDC_EPISODE_ID: 1361
MDC_IDC_EPISODE_ID: 1362
MDC_IDC_EPISODE_ID: 1363
MDC_IDC_EPISODE_ID: 1364
MDC_IDC_EPISODE_ID: 1365
MDC_IDC_EPISODE_ID: 1366
MDC_IDC_EPISODE_ID: 1367
MDC_IDC_EPISODE_ID: 1368
MDC_IDC_EPISODE_ID: 1369
MDC_IDC_EPISODE_ID: 1370
MDC_IDC_EPISODE_ID: 1371
MDC_IDC_EPISODE_ID: 1372
MDC_IDC_EPISODE_ID: 1373
MDC_IDC_EPISODE_ID: 1374
MDC_IDC_EPISODE_ID: 1375
MDC_IDC_EPISODE_ID: 1376
MDC_IDC_EPISODE_ID: 1377
MDC_IDC_EPISODE_ID: 1378
MDC_IDC_EPISODE_ID: 1379
MDC_IDC_EPISODE_ID: 1380
MDC_IDC_EPISODE_ID: 1381
MDC_IDC_EPISODE_ID: 1382
MDC_IDC_EPISODE_ID: 1383
MDC_IDC_EPISODE_ID: 1384
MDC_IDC_EPISODE_ID: 1385
MDC_IDC_EPISODE_ID: 1386
MDC_IDC_EPISODE_ID: 1387
MDC_IDC_EPISODE_ID: 1388
MDC_IDC_EPISODE_ID: 1389
MDC_IDC_EPISODE_ID: 1390
MDC_IDC_EPISODE_ID: 1391
MDC_IDC_EPISODE_ID: 1392
MDC_IDC_EPISODE_ID: 1393
MDC_IDC_EPISODE_ID: 1394
MDC_IDC_EPISODE_ID: 1395
MDC_IDC_EPISODE_ID: 1396
MDC_IDC_EPISODE_ID: 1397
MDC_IDC_EPISODE_ID: 1398
MDC_IDC_EPISODE_ID: 1399
MDC_IDC_EPISODE_ID: 1400
MDC_IDC_EPISODE_ID: 1401
MDC_IDC_EPISODE_ID: 1402
MDC_IDC_EPISODE_ID: 1403
MDC_IDC_EPISODE_ID: 1404
MDC_IDC_EPISODE_ID: 1405
MDC_IDC_EPISODE_ID: 1406
MDC_IDC_EPISODE_ID: 1407
MDC_IDC_EPISODE_ID: 1408
MDC_IDC_EPISODE_ID: 1409
MDC_IDC_EPISODE_ID: 1410
MDC_IDC_EPISODE_ID: 1411
MDC_IDC_EPISODE_ID: 1412
MDC_IDC_EPISODE_ID: 1413
MDC_IDC_EPISODE_ID: 1414
MDC_IDC_EPISODE_ID: 1415
MDC_IDC_EPISODE_ID: 1416
MDC_IDC_EPISODE_ID: 1417
MDC_IDC_EPISODE_ID: 1418
MDC_IDC_EPISODE_ID: 1419
MDC_IDC_EPISODE_ID: 1420
MDC_IDC_EPISODE_ID: 1421
MDC_IDC_EPISODE_TYPE: NORMAL
MDC_IDC_LEAD_IMPLANT_DT: NORMAL
MDC_IDC_LEAD_IMPLANT_DT: NORMAL
MDC_IDC_LEAD_LOCATION: NORMAL
MDC_IDC_LEAD_LOCATION: NORMAL
MDC_IDC_LEAD_LOCATION_DETAIL_1: NORMAL
MDC_IDC_LEAD_LOCATION_DETAIL_1: NORMAL
MDC_IDC_LEAD_MFG: NORMAL
MDC_IDC_LEAD_MFG: NORMAL
MDC_IDC_LEAD_MODEL: NORMAL
MDC_IDC_LEAD_MODEL: NORMAL
MDC_IDC_LEAD_POLARITY_TYPE: NORMAL
MDC_IDC_LEAD_POLARITY_TYPE: NORMAL
MDC_IDC_LEAD_SERIAL: NORMAL
MDC_IDC_LEAD_SERIAL: NORMAL
MDC_IDC_MSMT_BATTERY_DTM: NORMAL
MDC_IDC_MSMT_BATTERY_REMAINING_LONGEVITY: 23 MO
MDC_IDC_MSMT_BATTERY_RRT_TRIGGER: 2.83
MDC_IDC_MSMT_BATTERY_STATUS: NORMAL
MDC_IDC_MSMT_BATTERY_VOLTAGE: 2.92 V
MDC_IDC_MSMT_LEADCHNL_RA_IMPEDANCE_VALUE: 418 OHM
MDC_IDC_MSMT_LEADCHNL_RA_IMPEDANCE_VALUE: 437 OHM
MDC_IDC_MSMT_LEADCHNL_RA_SENSING_INTR_AMPL: 0.75 MV
MDC_IDC_MSMT_LEADCHNL_RA_SENSING_INTR_AMPL: 0.75 MV
MDC_IDC_MSMT_LEADCHNL_RV_IMPEDANCE_VALUE: 475 OHM
MDC_IDC_MSMT_LEADCHNL_RV_IMPEDANCE_VALUE: 532 OHM
MDC_IDC_MSMT_LEADCHNL_RV_PACING_THRESHOLD_AMPLITUDE: 0.88 V
MDC_IDC_MSMT_LEADCHNL_RV_PACING_THRESHOLD_PULSEWIDTH: 0.4 MS
MDC_IDC_MSMT_LEADCHNL_RV_SENSING_INTR_AMPL: 14.12 MV
MDC_IDC_MSMT_LEADCHNL_RV_SENSING_INTR_AMPL: 14.12 MV
MDC_IDC_PG_IMPLANT_DTM: NORMAL
MDC_IDC_PG_MFG: NORMAL
MDC_IDC_PG_MODEL: NORMAL
MDC_IDC_PG_SERIAL: NORMAL
MDC_IDC_PG_TYPE: NORMAL
MDC_IDC_SESS_CLINIC_NAME: NORMAL
MDC_IDC_SESS_DTM: NORMAL
MDC_IDC_SESS_TYPE: NORMAL
MDC_IDC_SET_BRADY_AT_MODE_SWITCH_RATE: 171 {BEATS}/MIN
MDC_IDC_SET_BRADY_HYSTRATE: NORMAL
MDC_IDC_SET_BRADY_LOWRATE: 60 {BEATS}/MIN
MDC_IDC_SET_BRADY_MAX_SENSOR_RATE: 120 {BEATS}/MIN
MDC_IDC_SET_BRADY_MAX_TRACKING_RATE: 120 {BEATS}/MIN
MDC_IDC_SET_BRADY_MODE: NORMAL
MDC_IDC_SET_BRADY_PAV_DELAY_LOW: 180 MS
MDC_IDC_SET_BRADY_SAV_DELAY_LOW: 150 MS
MDC_IDC_SET_LEADCHNL_RA_PACING_AMPLITUDE: 1.5 V
MDC_IDC_SET_LEADCHNL_RA_PACING_ANODE_ELECTRODE_1: NORMAL
MDC_IDC_SET_LEADCHNL_RA_PACING_ANODE_LOCATION_1: NORMAL
MDC_IDC_SET_LEADCHNL_RA_PACING_CAPTURE_MODE: NORMAL
MDC_IDC_SET_LEADCHNL_RA_PACING_CATHODE_ELECTRODE_1: NORMAL
MDC_IDC_SET_LEADCHNL_RA_PACING_CATHODE_LOCATION_1: NORMAL
MDC_IDC_SET_LEADCHNL_RA_PACING_POLARITY: NORMAL
MDC_IDC_SET_LEADCHNL_RA_PACING_PULSEWIDTH: 0.4 MS
MDC_IDC_SET_LEADCHNL_RA_SENSING_ANODE_ELECTRODE_1: NORMAL
MDC_IDC_SET_LEADCHNL_RA_SENSING_ANODE_LOCATION_1: NORMAL
MDC_IDC_SET_LEADCHNL_RA_SENSING_CATHODE_ELECTRODE_1: NORMAL
MDC_IDC_SET_LEADCHNL_RA_SENSING_CATHODE_LOCATION_1: NORMAL
MDC_IDC_SET_LEADCHNL_RA_SENSING_POLARITY: NORMAL
MDC_IDC_SET_LEADCHNL_RA_SENSING_SENSITIVITY: 0.3 MV
MDC_IDC_SET_LEADCHNL_RV_PACING_AMPLITUDE: 2 V
MDC_IDC_SET_LEADCHNL_RV_PACING_ANODE_ELECTRODE_1: NORMAL
MDC_IDC_SET_LEADCHNL_RV_PACING_ANODE_LOCATION_1: NORMAL
MDC_IDC_SET_LEADCHNL_RV_PACING_CAPTURE_MODE: NORMAL
MDC_IDC_SET_LEADCHNL_RV_PACING_CATHODE_ELECTRODE_1: NORMAL
MDC_IDC_SET_LEADCHNL_RV_PACING_CATHODE_LOCATION_1: NORMAL
MDC_IDC_SET_LEADCHNL_RV_PACING_POLARITY: NORMAL
MDC_IDC_SET_LEADCHNL_RV_PACING_PULSEWIDTH: 0.4 MS
MDC_IDC_SET_LEADCHNL_RV_SENSING_ANODE_ELECTRODE_1: NORMAL
MDC_IDC_SET_LEADCHNL_RV_SENSING_ANODE_LOCATION_1: NORMAL
MDC_IDC_SET_LEADCHNL_RV_SENSING_CATHODE_ELECTRODE_1: NORMAL
MDC_IDC_SET_LEADCHNL_RV_SENSING_CATHODE_LOCATION_1: NORMAL
MDC_IDC_SET_LEADCHNL_RV_SENSING_POLARITY: NORMAL
MDC_IDC_SET_LEADCHNL_RV_SENSING_SENSITIVITY: 0.9 MV
MDC_IDC_SET_ZONE_DETECTION_INTERVAL: 200 MS
MDC_IDC_SET_ZONE_DETECTION_INTERVAL: 350 MS
MDC_IDC_SET_ZONE_DETECTION_INTERVAL: 400 MS
MDC_IDC_SET_ZONE_TYPE: NORMAL
MDC_IDC_STAT_AT_BURDEN_PERCENT: 1.8 %
MDC_IDC_STAT_AT_DTM_END: NORMAL
MDC_IDC_STAT_AT_DTM_START: NORMAL
MDC_IDC_STAT_BRADY_AP_VP_PERCENT: 64.83 %
MDC_IDC_STAT_BRADY_AP_VS_PERCENT: 0.01 %
MDC_IDC_STAT_BRADY_AS_VP_PERCENT: 35.1 %
MDC_IDC_STAT_BRADY_AS_VS_PERCENT: 0.06 %
MDC_IDC_STAT_BRADY_DTM_END: NORMAL
MDC_IDC_STAT_BRADY_DTM_START: NORMAL
MDC_IDC_STAT_BRADY_RA_PERCENT_PACED: 64.4 %
MDC_IDC_STAT_BRADY_RV_PERCENT_PACED: 99.78 %
MDC_IDC_STAT_EPISODE_RECENT_COUNT: 0
MDC_IDC_STAT_EPISODE_RECENT_COUNT: 122
MDC_IDC_STAT_EPISODE_RECENT_COUNT_DTM_END: NORMAL
MDC_IDC_STAT_EPISODE_RECENT_COUNT_DTM_START: NORMAL
MDC_IDC_STAT_EPISODE_TOTAL_COUNT: 0
MDC_IDC_STAT_EPISODE_TOTAL_COUNT: 0
MDC_IDC_STAT_EPISODE_TOTAL_COUNT: 1
MDC_IDC_STAT_EPISODE_TOTAL_COUNT: 2187
MDC_IDC_STAT_EPISODE_TOTAL_COUNT_DTM_END: NORMAL
MDC_IDC_STAT_EPISODE_TOTAL_COUNT_DTM_START: NORMAL
MDC_IDC_STAT_EPISODE_TYPE: NORMAL

## 2022-04-22 ENCOUNTER — ANCILLARY PROCEDURE (OUTPATIENT)
Dept: CARDIOLOGY | Facility: CLINIC | Age: 87
End: 2022-04-22
Attending: INTERNAL MEDICINE
Payer: MEDICARE

## 2022-04-22 DIAGNOSIS — I44.30 AV BLOCK: ICD-10-CM

## 2022-04-22 PROCEDURE — 93296 REM INTERROG EVL PM/IDS: CPT

## 2022-04-22 PROCEDURE — 93294 REM INTERROG EVL PM/LDLS PM: CPT | Performed by: INTERNAL MEDICINE

## 2022-06-03 LAB
MDC_IDC_EPISODE_DTM: NORMAL
MDC_IDC_EPISODE_DURATION: 100 S
MDC_IDC_EPISODE_DURATION: 101 S
MDC_IDC_EPISODE_DURATION: 102 S
MDC_IDC_EPISODE_DURATION: 103 S
MDC_IDC_EPISODE_DURATION: 103 S
MDC_IDC_EPISODE_DURATION: 104 S
MDC_IDC_EPISODE_DURATION: 105 S
MDC_IDC_EPISODE_DURATION: 105 S
MDC_IDC_EPISODE_DURATION: 106 S
MDC_IDC_EPISODE_DURATION: 110 S
MDC_IDC_EPISODE_DURATION: 111 S
MDC_IDC_EPISODE_DURATION: 112 S
MDC_IDC_EPISODE_DURATION: 113 S
MDC_IDC_EPISODE_DURATION: 114 S
MDC_IDC_EPISODE_DURATION: 1147 S
MDC_IDC_EPISODE_DURATION: 121 S
MDC_IDC_EPISODE_DURATION: 1281 S
MDC_IDC_EPISODE_DURATION: 132 S
MDC_IDC_EPISODE_DURATION: 134 S
MDC_IDC_EPISODE_DURATION: 1340 S
MDC_IDC_EPISODE_DURATION: 137 S
MDC_IDC_EPISODE_DURATION: 138 S
MDC_IDC_EPISODE_DURATION: 138 S
MDC_IDC_EPISODE_DURATION: 1399 S
MDC_IDC_EPISODE_DURATION: 14 S
MDC_IDC_EPISODE_DURATION: 1413 S
MDC_IDC_EPISODE_DURATION: 145 S
MDC_IDC_EPISODE_DURATION: 148 S
MDC_IDC_EPISODE_DURATION: 151 S
MDC_IDC_EPISODE_DURATION: 1541 S
MDC_IDC_EPISODE_DURATION: 157 S
MDC_IDC_EPISODE_DURATION: 164 S
MDC_IDC_EPISODE_DURATION: 1729 S
MDC_IDC_EPISODE_DURATION: 174 S
MDC_IDC_EPISODE_DURATION: 175 S
MDC_IDC_EPISODE_DURATION: 1758 S
MDC_IDC_EPISODE_DURATION: 1783 S
MDC_IDC_EPISODE_DURATION: 1786 S
MDC_IDC_EPISODE_DURATION: 181 S
MDC_IDC_EPISODE_DURATION: 183 S
MDC_IDC_EPISODE_DURATION: 196 S
MDC_IDC_EPISODE_DURATION: 199 S
MDC_IDC_EPISODE_DURATION: 2065 S
MDC_IDC_EPISODE_DURATION: 216 S
MDC_IDC_EPISODE_DURATION: 22 S
MDC_IDC_EPISODE_DURATION: 224 S
MDC_IDC_EPISODE_DURATION: 226 S
MDC_IDC_EPISODE_DURATION: 231 S
MDC_IDC_EPISODE_DURATION: 232 S
MDC_IDC_EPISODE_DURATION: 235 S
MDC_IDC_EPISODE_DURATION: 245 S
MDC_IDC_EPISODE_DURATION: 2470 S
MDC_IDC_EPISODE_DURATION: 267 S
MDC_IDC_EPISODE_DURATION: 280 S
MDC_IDC_EPISODE_DURATION: 2841 S
MDC_IDC_EPISODE_DURATION: 305 S
MDC_IDC_EPISODE_DURATION: 307 S
MDC_IDC_EPISODE_DURATION: 313 S
MDC_IDC_EPISODE_DURATION: 316 S
MDC_IDC_EPISODE_DURATION: 317 S
MDC_IDC_EPISODE_DURATION: 326 S
MDC_IDC_EPISODE_DURATION: 329 S
MDC_IDC_EPISODE_DURATION: 3343 S
MDC_IDC_EPISODE_DURATION: 34 S
MDC_IDC_EPISODE_DURATION: 355 S
MDC_IDC_EPISODE_DURATION: 358 S
MDC_IDC_EPISODE_DURATION: 36 S
MDC_IDC_EPISODE_DURATION: 3651 S
MDC_IDC_EPISODE_DURATION: 37 S
MDC_IDC_EPISODE_DURATION: 370 S
MDC_IDC_EPISODE_DURATION: 379 S
MDC_IDC_EPISODE_DURATION: 38 S
MDC_IDC_EPISODE_DURATION: 413 S
MDC_IDC_EPISODE_DURATION: 413 S
MDC_IDC_EPISODE_DURATION: 421 S
MDC_IDC_EPISODE_DURATION: 432 S
MDC_IDC_EPISODE_DURATION: 45 S
MDC_IDC_EPISODE_DURATION: 451 S
MDC_IDC_EPISODE_DURATION: 47 S
MDC_IDC_EPISODE_DURATION: 534 S
MDC_IDC_EPISODE_DURATION: 544 S
MDC_IDC_EPISODE_DURATION: 55 S
MDC_IDC_EPISODE_DURATION: 5559 S
MDC_IDC_EPISODE_DURATION: 574 S
MDC_IDC_EPISODE_DURATION: 590 S
MDC_IDC_EPISODE_DURATION: 597 S
MDC_IDC_EPISODE_DURATION: 60 S
MDC_IDC_EPISODE_DURATION: 60 S
MDC_IDC_EPISODE_DURATION: 602 S
MDC_IDC_EPISODE_DURATION: 619 S
MDC_IDC_EPISODE_DURATION: 626 S
MDC_IDC_EPISODE_DURATION: 661 S
MDC_IDC_EPISODE_DURATION: 666 S
MDC_IDC_EPISODE_DURATION: 673 S
MDC_IDC_EPISODE_DURATION: 71 S
MDC_IDC_EPISODE_DURATION: 74 S
MDC_IDC_EPISODE_DURATION: 75 S
MDC_IDC_EPISODE_DURATION: 75 S
MDC_IDC_EPISODE_DURATION: 76 S
MDC_IDC_EPISODE_DURATION: 764 S
MDC_IDC_EPISODE_DURATION: 78 S
MDC_IDC_EPISODE_DURATION: 79 S
MDC_IDC_EPISODE_DURATION: 799 S
MDC_IDC_EPISODE_DURATION: 87 S
MDC_IDC_EPISODE_DURATION: 873 S
MDC_IDC_EPISODE_DURATION: 88 S
MDC_IDC_EPISODE_DURATION: 88 S
MDC_IDC_EPISODE_DURATION: 894 S
MDC_IDC_EPISODE_DURATION: 895 S
MDC_IDC_EPISODE_DURATION: 92 S
MDC_IDC_EPISODE_DURATION: 94 S
MDC_IDC_EPISODE_DURATION: 96 S
MDC_IDC_EPISODE_DURATION: 97 S
MDC_IDC_EPISODE_DURATION: 977 S
MDC_IDC_EPISODE_DURATION: 98 S
MDC_IDC_EPISODE_DURATION: NORMAL S
MDC_IDC_EPISODE_ID: 1430
MDC_IDC_EPISODE_ID: 1438
MDC_IDC_EPISODE_ID: 1444
MDC_IDC_EPISODE_ID: 1445
MDC_IDC_EPISODE_ID: 1446
MDC_IDC_EPISODE_ID: 1447
MDC_IDC_EPISODE_ID: 1448
MDC_IDC_EPISODE_ID: 1449
MDC_IDC_EPISODE_ID: 1450
MDC_IDC_EPISODE_ID: 1451
MDC_IDC_EPISODE_ID: 1452
MDC_IDC_EPISODE_ID: 1453
MDC_IDC_EPISODE_ID: 1454
MDC_IDC_EPISODE_ID: 1455
MDC_IDC_EPISODE_ID: 1456
MDC_IDC_EPISODE_ID: 1457
MDC_IDC_EPISODE_ID: 1458
MDC_IDC_EPISODE_ID: 1459
MDC_IDC_EPISODE_ID: 1460
MDC_IDC_EPISODE_ID: 1461
MDC_IDC_EPISODE_ID: 1462
MDC_IDC_EPISODE_ID: 1463
MDC_IDC_EPISODE_ID: 1464
MDC_IDC_EPISODE_ID: 1465
MDC_IDC_EPISODE_ID: 1466
MDC_IDC_EPISODE_ID: 1467
MDC_IDC_EPISODE_ID: 1468
MDC_IDC_EPISODE_ID: 1469
MDC_IDC_EPISODE_ID: 1470
MDC_IDC_EPISODE_ID: 1471
MDC_IDC_EPISODE_ID: 1472
MDC_IDC_EPISODE_ID: 1473
MDC_IDC_EPISODE_ID: 1474
MDC_IDC_EPISODE_ID: 1475
MDC_IDC_EPISODE_ID: 1476
MDC_IDC_EPISODE_ID: 1477
MDC_IDC_EPISODE_ID: 1478
MDC_IDC_EPISODE_ID: 1479
MDC_IDC_EPISODE_ID: 1480
MDC_IDC_EPISODE_ID: 1481
MDC_IDC_EPISODE_ID: 1482
MDC_IDC_EPISODE_ID: 1483
MDC_IDC_EPISODE_ID: 1484
MDC_IDC_EPISODE_ID: 1485
MDC_IDC_EPISODE_ID: 1486
MDC_IDC_EPISODE_ID: 1487
MDC_IDC_EPISODE_ID: 1488
MDC_IDC_EPISODE_ID: 1489
MDC_IDC_EPISODE_ID: 1490
MDC_IDC_EPISODE_ID: 1491
MDC_IDC_EPISODE_ID: 1492
MDC_IDC_EPISODE_ID: 1493
MDC_IDC_EPISODE_ID: 1494
MDC_IDC_EPISODE_ID: 1495
MDC_IDC_EPISODE_ID: 1496
MDC_IDC_EPISODE_ID: 1497
MDC_IDC_EPISODE_ID: 1498
MDC_IDC_EPISODE_ID: 1499
MDC_IDC_EPISODE_ID: 1500
MDC_IDC_EPISODE_ID: 1501
MDC_IDC_EPISODE_ID: 1502
MDC_IDC_EPISODE_ID: 1503
MDC_IDC_EPISODE_ID: 1504
MDC_IDC_EPISODE_ID: 1505
MDC_IDC_EPISODE_ID: 1506
MDC_IDC_EPISODE_ID: 1507
MDC_IDC_EPISODE_ID: 1508
MDC_IDC_EPISODE_ID: 1509
MDC_IDC_EPISODE_ID: 1510
MDC_IDC_EPISODE_ID: 1511
MDC_IDC_EPISODE_ID: 1512
MDC_IDC_EPISODE_ID: 1513
MDC_IDC_EPISODE_ID: 1514
MDC_IDC_EPISODE_ID: 1515
MDC_IDC_EPISODE_ID: 1516
MDC_IDC_EPISODE_ID: 1517
MDC_IDC_EPISODE_ID: 1518
MDC_IDC_EPISODE_ID: 1519
MDC_IDC_EPISODE_ID: 1520
MDC_IDC_EPISODE_ID: 1521
MDC_IDC_EPISODE_ID: 1522
MDC_IDC_EPISODE_ID: 1523
MDC_IDC_EPISODE_ID: 1524
MDC_IDC_EPISODE_ID: 1525
MDC_IDC_EPISODE_ID: 1526
MDC_IDC_EPISODE_ID: 1527
MDC_IDC_EPISODE_ID: 1528
MDC_IDC_EPISODE_ID: 1529
MDC_IDC_EPISODE_ID: 1530
MDC_IDC_EPISODE_ID: 1531
MDC_IDC_EPISODE_ID: 1532
MDC_IDC_EPISODE_ID: 1533
MDC_IDC_EPISODE_ID: 1534
MDC_IDC_EPISODE_ID: 1535
MDC_IDC_EPISODE_ID: 1536
MDC_IDC_EPISODE_ID: 1537
MDC_IDC_EPISODE_ID: 1538
MDC_IDC_EPISODE_ID: 1539
MDC_IDC_EPISODE_ID: 1540
MDC_IDC_EPISODE_ID: 1541
MDC_IDC_EPISODE_ID: 1542
MDC_IDC_EPISODE_ID: 1543
MDC_IDC_EPISODE_ID: 1544
MDC_IDC_EPISODE_ID: 1545
MDC_IDC_EPISODE_ID: 1546
MDC_IDC_EPISODE_ID: 1547
MDC_IDC_EPISODE_ID: 1548
MDC_IDC_EPISODE_ID: 1549
MDC_IDC_EPISODE_ID: 1550
MDC_IDC_EPISODE_ID: 1551
MDC_IDC_EPISODE_ID: 1552
MDC_IDC_EPISODE_ID: 1553
MDC_IDC_EPISODE_ID: 1554
MDC_IDC_EPISODE_ID: 1555
MDC_IDC_EPISODE_ID: 1556
MDC_IDC_EPISODE_ID: 1557
MDC_IDC_EPISODE_ID: 1558
MDC_IDC_EPISODE_ID: 1559
MDC_IDC_EPISODE_ID: 1560
MDC_IDC_EPISODE_ID: 1561
MDC_IDC_EPISODE_TYPE: NORMAL
MDC_IDC_LEAD_IMPLANT_DT: NORMAL
MDC_IDC_LEAD_IMPLANT_DT: NORMAL
MDC_IDC_LEAD_LOCATION: NORMAL
MDC_IDC_LEAD_LOCATION: NORMAL
MDC_IDC_LEAD_LOCATION_DETAIL_1: NORMAL
MDC_IDC_LEAD_LOCATION_DETAIL_1: NORMAL
MDC_IDC_LEAD_MFG: NORMAL
MDC_IDC_LEAD_MFG: NORMAL
MDC_IDC_LEAD_MODEL: NORMAL
MDC_IDC_LEAD_MODEL: NORMAL
MDC_IDC_LEAD_POLARITY_TYPE: NORMAL
MDC_IDC_LEAD_POLARITY_TYPE: NORMAL
MDC_IDC_LEAD_SERIAL: NORMAL
MDC_IDC_LEAD_SERIAL: NORMAL
MDC_IDC_MSMT_BATTERY_DTM: NORMAL
MDC_IDC_MSMT_BATTERY_REMAINING_LONGEVITY: 19 MO
MDC_IDC_MSMT_BATTERY_RRT_TRIGGER: 2.83
MDC_IDC_MSMT_BATTERY_STATUS: NORMAL
MDC_IDC_MSMT_BATTERY_VOLTAGE: 2.91 V
MDC_IDC_MSMT_LEADCHNL_RA_IMPEDANCE_VALUE: 380 OHM
MDC_IDC_MSMT_LEADCHNL_RA_IMPEDANCE_VALUE: 418 OHM
MDC_IDC_MSMT_LEADCHNL_RA_PACING_THRESHOLD_AMPLITUDE: 0.62 V
MDC_IDC_MSMT_LEADCHNL_RA_PACING_THRESHOLD_PULSEWIDTH: 0.4 MS
MDC_IDC_MSMT_LEADCHNL_RA_SENSING_INTR_AMPL: 0.88 MV
MDC_IDC_MSMT_LEADCHNL_RA_SENSING_INTR_AMPL: 0.88 MV
MDC_IDC_MSMT_LEADCHNL_RV_IMPEDANCE_VALUE: 437 OHM
MDC_IDC_MSMT_LEADCHNL_RV_IMPEDANCE_VALUE: 494 OHM
MDC_IDC_MSMT_LEADCHNL_RV_PACING_THRESHOLD_AMPLITUDE: 0.88 V
MDC_IDC_MSMT_LEADCHNL_RV_PACING_THRESHOLD_PULSEWIDTH: 0.4 MS
MDC_IDC_MSMT_LEADCHNL_RV_SENSING_INTR_AMPL: 8.88 MV
MDC_IDC_MSMT_LEADCHNL_RV_SENSING_INTR_AMPL: 8.88 MV
MDC_IDC_PG_IMPLANT_DTM: NORMAL
MDC_IDC_PG_MFG: NORMAL
MDC_IDC_PG_MODEL: NORMAL
MDC_IDC_PG_SERIAL: NORMAL
MDC_IDC_PG_TYPE: NORMAL
MDC_IDC_SESS_CLINIC_NAME: NORMAL
MDC_IDC_SESS_DTM: NORMAL
MDC_IDC_SESS_TYPE: NORMAL
MDC_IDC_SET_BRADY_AT_MODE_SWITCH_RATE: 171 {BEATS}/MIN
MDC_IDC_SET_BRADY_HYSTRATE: NORMAL
MDC_IDC_SET_BRADY_LOWRATE: 60 {BEATS}/MIN
MDC_IDC_SET_BRADY_MAX_SENSOR_RATE: 120 {BEATS}/MIN
MDC_IDC_SET_BRADY_MAX_TRACKING_RATE: 120 {BEATS}/MIN
MDC_IDC_SET_BRADY_MODE: NORMAL
MDC_IDC_SET_BRADY_PAV_DELAY_LOW: 180 MS
MDC_IDC_SET_BRADY_SAV_DELAY_LOW: 150 MS
MDC_IDC_SET_LEADCHNL_RA_PACING_AMPLITUDE: 1.5 V
MDC_IDC_SET_LEADCHNL_RA_PACING_ANODE_ELECTRODE_1: NORMAL
MDC_IDC_SET_LEADCHNL_RA_PACING_ANODE_LOCATION_1: NORMAL
MDC_IDC_SET_LEADCHNL_RA_PACING_CAPTURE_MODE: NORMAL
MDC_IDC_SET_LEADCHNL_RA_PACING_CATHODE_ELECTRODE_1: NORMAL
MDC_IDC_SET_LEADCHNL_RA_PACING_CATHODE_LOCATION_1: NORMAL
MDC_IDC_SET_LEADCHNL_RA_PACING_POLARITY: NORMAL
MDC_IDC_SET_LEADCHNL_RA_PACING_PULSEWIDTH: 0.4 MS
MDC_IDC_SET_LEADCHNL_RA_SENSING_ANODE_ELECTRODE_1: NORMAL
MDC_IDC_SET_LEADCHNL_RA_SENSING_ANODE_LOCATION_1: NORMAL
MDC_IDC_SET_LEADCHNL_RA_SENSING_CATHODE_ELECTRODE_1: NORMAL
MDC_IDC_SET_LEADCHNL_RA_SENSING_CATHODE_LOCATION_1: NORMAL
MDC_IDC_SET_LEADCHNL_RA_SENSING_POLARITY: NORMAL
MDC_IDC_SET_LEADCHNL_RA_SENSING_SENSITIVITY: 0.3 MV
MDC_IDC_SET_LEADCHNL_RV_PACING_AMPLITUDE: 2 V
MDC_IDC_SET_LEADCHNL_RV_PACING_ANODE_ELECTRODE_1: NORMAL
MDC_IDC_SET_LEADCHNL_RV_PACING_ANODE_LOCATION_1: NORMAL
MDC_IDC_SET_LEADCHNL_RV_PACING_CAPTURE_MODE: NORMAL
MDC_IDC_SET_LEADCHNL_RV_PACING_CATHODE_ELECTRODE_1: NORMAL
MDC_IDC_SET_LEADCHNL_RV_PACING_CATHODE_LOCATION_1: NORMAL
MDC_IDC_SET_LEADCHNL_RV_PACING_POLARITY: NORMAL
MDC_IDC_SET_LEADCHNL_RV_PACING_PULSEWIDTH: 0.4 MS
MDC_IDC_SET_LEADCHNL_RV_SENSING_ANODE_ELECTRODE_1: NORMAL
MDC_IDC_SET_LEADCHNL_RV_SENSING_ANODE_LOCATION_1: NORMAL
MDC_IDC_SET_LEADCHNL_RV_SENSING_CATHODE_ELECTRODE_1: NORMAL
MDC_IDC_SET_LEADCHNL_RV_SENSING_CATHODE_LOCATION_1: NORMAL
MDC_IDC_SET_LEADCHNL_RV_SENSING_POLARITY: NORMAL
MDC_IDC_SET_LEADCHNL_RV_SENSING_SENSITIVITY: 0.9 MV
MDC_IDC_SET_ZONE_DETECTION_INTERVAL: 200 MS
MDC_IDC_SET_ZONE_DETECTION_INTERVAL: 350 MS
MDC_IDC_SET_ZONE_DETECTION_INTERVAL: 400 MS
MDC_IDC_SET_ZONE_TYPE: NORMAL
MDC_IDC_STAT_AT_BURDEN_PERCENT: 1.1 %
MDC_IDC_STAT_AT_DTM_END: NORMAL
MDC_IDC_STAT_AT_DTM_START: NORMAL
MDC_IDC_STAT_BRADY_AP_VP_PERCENT: 34.48 %
MDC_IDC_STAT_BRADY_AP_VS_PERCENT: 0 %
MDC_IDC_STAT_BRADY_AS_VP_PERCENT: 65.43 %
MDC_IDC_STAT_BRADY_AS_VS_PERCENT: 0.09 %
MDC_IDC_STAT_BRADY_DTM_END: NORMAL
MDC_IDC_STAT_BRADY_DTM_START: NORMAL
MDC_IDC_STAT_BRADY_RA_PERCENT_PACED: 34.3 %
MDC_IDC_STAT_BRADY_RV_PERCENT_PACED: 99.86 %
MDC_IDC_STAT_EPISODE_RECENT_COUNT: 0
MDC_IDC_STAT_EPISODE_RECENT_COUNT: 140
MDC_IDC_STAT_EPISODE_RECENT_COUNT_DTM_END: NORMAL
MDC_IDC_STAT_EPISODE_RECENT_COUNT_DTM_START: NORMAL
MDC_IDC_STAT_EPISODE_TOTAL_COUNT: 0
MDC_IDC_STAT_EPISODE_TOTAL_COUNT: 0
MDC_IDC_STAT_EPISODE_TOTAL_COUNT: 1
MDC_IDC_STAT_EPISODE_TOTAL_COUNT: 2447
MDC_IDC_STAT_EPISODE_TOTAL_COUNT_DTM_END: NORMAL
MDC_IDC_STAT_EPISODE_TOTAL_COUNT_DTM_START: NORMAL
MDC_IDC_STAT_EPISODE_TYPE: NORMAL

## 2022-07-25 ENCOUNTER — ANCILLARY PROCEDURE (OUTPATIENT)
Dept: CARDIOLOGY | Facility: CLINIC | Age: 87
End: 2022-07-25
Attending: INTERNAL MEDICINE
Payer: MEDICARE

## 2022-07-25 ENCOUNTER — TELEPHONE (OUTPATIENT)
Dept: CARDIOLOGY | Facility: CLINIC | Age: 87
End: 2022-07-25

## 2022-07-25 DIAGNOSIS — I44.30 AV BLOCK: ICD-10-CM

## 2022-07-25 PROCEDURE — 93296 REM INTERROG EVL PM/IDS: CPT

## 2022-07-25 PROCEDURE — 93294 REM INTERROG EVL PM/LDLS PM: CPT | Performed by: INTERNAL MEDICINE

## 2022-07-25 NOTE — LETTER
July 25, 2022      TO: Michaela Soria  [address] [address] [facility] [address]         Dear Michaela      RE: Remote Check    We are writing to you regarding your Appointment per Remote device check from home monitor. If you could please send your transmission at any time today.     If you have any questions regarding this appointment, if you are unable to send this remote device check or need our assistance with your home monitor please call the Device Clinic at [phone]    To schedule or reschedule, please call [phone] and press 1.        Sincerely,    [facility]    It was a pleasure to see you at your last clinic visit. Please do not hesitate to call me if you have any questions or concerns.    Sincerely,      Junaid Smith MD

## 2022-07-25 NOTE — TELEPHONE ENCOUNTER
Telephone Reminder, unable to lvm per Remote Device Check. Letter sent.  Nasir Puente, Virtual Facilitator, 7/25/2022

## 2022-07-29 LAB
MDC_IDC_EPISODE_DTM: NORMAL
MDC_IDC_EPISODE_DURATION: 101 S
MDC_IDC_EPISODE_DURATION: 1079 S
MDC_IDC_EPISODE_DURATION: 1187 S
MDC_IDC_EPISODE_DURATION: 120 S
MDC_IDC_EPISODE_DURATION: 132 S
MDC_IDC_EPISODE_DURATION: 151 S
MDC_IDC_EPISODE_DURATION: 155 S
MDC_IDC_EPISODE_DURATION: 156 S
MDC_IDC_EPISODE_DURATION: 163 S
MDC_IDC_EPISODE_DURATION: 17 S
MDC_IDC_EPISODE_DURATION: 178 S
MDC_IDC_EPISODE_DURATION: 202 S
MDC_IDC_EPISODE_DURATION: 241 S
MDC_IDC_EPISODE_DURATION: 243 S
MDC_IDC_EPISODE_DURATION: 252 S
MDC_IDC_EPISODE_DURATION: 2953 S
MDC_IDC_EPISODE_DURATION: 307 S
MDC_IDC_EPISODE_DURATION: 311 S
MDC_IDC_EPISODE_DURATION: 331 S
MDC_IDC_EPISODE_DURATION: 387 S
MDC_IDC_EPISODE_DURATION: 43 S
MDC_IDC_EPISODE_DURATION: 58 S
MDC_IDC_EPISODE_DURATION: 66 S
MDC_IDC_EPISODE_DURATION: 70 S
MDC_IDC_EPISODE_DURATION: 70 S
MDC_IDC_EPISODE_DURATION: 749 S
MDC_IDC_EPISODE_DURATION: 7740 S
MDC_IDC_EPISODE_DURATION: 793 S
MDC_IDC_EPISODE_DURATION: 9 S
MDC_IDC_EPISODE_DURATION: 91 S
MDC_IDC_EPISODE_ID: 1562
MDC_IDC_EPISODE_ID: 1563
MDC_IDC_EPISODE_ID: 1564
MDC_IDC_EPISODE_ID: 1565
MDC_IDC_EPISODE_ID: 1566
MDC_IDC_EPISODE_ID: 1567
MDC_IDC_EPISODE_ID: 1568
MDC_IDC_EPISODE_ID: 1569
MDC_IDC_EPISODE_ID: 1570
MDC_IDC_EPISODE_ID: 1571
MDC_IDC_EPISODE_ID: 1572
MDC_IDC_EPISODE_ID: 1573
MDC_IDC_EPISODE_ID: 1574
MDC_IDC_EPISODE_ID: 1575
MDC_IDC_EPISODE_ID: 1576
MDC_IDC_EPISODE_ID: 1577
MDC_IDC_EPISODE_ID: 1578
MDC_IDC_EPISODE_ID: 1579
MDC_IDC_EPISODE_ID: 1580
MDC_IDC_EPISODE_ID: 1581
MDC_IDC_EPISODE_ID: 1582
MDC_IDC_EPISODE_ID: 1583
MDC_IDC_EPISODE_ID: 1584
MDC_IDC_EPISODE_ID: 1585
MDC_IDC_EPISODE_ID: 1586
MDC_IDC_EPISODE_ID: 1587
MDC_IDC_EPISODE_ID: 1588
MDC_IDC_EPISODE_ID: 1589
MDC_IDC_EPISODE_ID: 1590
MDC_IDC_EPISODE_ID: 1591
MDC_IDC_EPISODE_TYPE: NORMAL
MDC_IDC_LEAD_IMPLANT_DT: NORMAL
MDC_IDC_LEAD_IMPLANT_DT: NORMAL
MDC_IDC_LEAD_LOCATION: NORMAL
MDC_IDC_LEAD_LOCATION: NORMAL
MDC_IDC_LEAD_LOCATION_DETAIL_1: NORMAL
MDC_IDC_LEAD_LOCATION_DETAIL_1: NORMAL
MDC_IDC_LEAD_MFG: NORMAL
MDC_IDC_LEAD_MFG: NORMAL
MDC_IDC_LEAD_MODEL: NORMAL
MDC_IDC_LEAD_MODEL: NORMAL
MDC_IDC_LEAD_POLARITY_TYPE: NORMAL
MDC_IDC_LEAD_POLARITY_TYPE: NORMAL
MDC_IDC_LEAD_SERIAL: NORMAL
MDC_IDC_LEAD_SERIAL: NORMAL
MDC_IDC_MSMT_BATTERY_DTM: NORMAL
MDC_IDC_MSMT_BATTERY_REMAINING_LONGEVITY: 15 MO
MDC_IDC_MSMT_BATTERY_RRT_TRIGGER: 2.83
MDC_IDC_MSMT_BATTERY_STATUS: NORMAL
MDC_IDC_MSMT_BATTERY_VOLTAGE: 2.9 V
MDC_IDC_MSMT_LEADCHNL_RA_IMPEDANCE_VALUE: 418 OHM
MDC_IDC_MSMT_LEADCHNL_RA_IMPEDANCE_VALUE: 437 OHM
MDC_IDC_MSMT_LEADCHNL_RA_PACING_THRESHOLD_AMPLITUDE: 0.62 V
MDC_IDC_MSMT_LEADCHNL_RA_PACING_THRESHOLD_PULSEWIDTH: 0.4 MS
MDC_IDC_MSMT_LEADCHNL_RA_SENSING_INTR_AMPL: 0.88 MV
MDC_IDC_MSMT_LEADCHNL_RV_IMPEDANCE_VALUE: 456 OHM
MDC_IDC_MSMT_LEADCHNL_RV_IMPEDANCE_VALUE: 532 OHM
MDC_IDC_MSMT_LEADCHNL_RV_PACING_THRESHOLD_AMPLITUDE: 1 V
MDC_IDC_MSMT_LEADCHNL_RV_PACING_THRESHOLD_PULSEWIDTH: 0.4 MS
MDC_IDC_MSMT_LEADCHNL_RV_SENSING_INTR_AMPL: 8.62 MV
MDC_IDC_PG_IMPLANT_DTM: NORMAL
MDC_IDC_PG_MFG: NORMAL
MDC_IDC_PG_MODEL: NORMAL
MDC_IDC_PG_SERIAL: NORMAL
MDC_IDC_PG_TYPE: NORMAL
MDC_IDC_SESS_CLINIC_NAME: NORMAL
MDC_IDC_SESS_DTM: NORMAL
MDC_IDC_SESS_TYPE: NORMAL
MDC_IDC_SET_BRADY_AT_MODE_SWITCH_RATE: 171 {BEATS}/MIN
MDC_IDC_SET_BRADY_HYSTRATE: NORMAL
MDC_IDC_SET_BRADY_LOWRATE: 60 {BEATS}/MIN
MDC_IDC_SET_BRADY_MAX_SENSOR_RATE: 120 {BEATS}/MIN
MDC_IDC_SET_BRADY_MAX_TRACKING_RATE: 120 {BEATS}/MIN
MDC_IDC_SET_BRADY_MODE: NORMAL
MDC_IDC_SET_BRADY_PAV_DELAY_LOW: 180 MS
MDC_IDC_SET_BRADY_SAV_DELAY_LOW: 150 MS
MDC_IDC_SET_LEADCHNL_RA_PACING_AMPLITUDE: 1.5 V
MDC_IDC_SET_LEADCHNL_RA_PACING_ANODE_ELECTRODE_1: NORMAL
MDC_IDC_SET_LEADCHNL_RA_PACING_ANODE_LOCATION_1: NORMAL
MDC_IDC_SET_LEADCHNL_RA_PACING_CAPTURE_MODE: NORMAL
MDC_IDC_SET_LEADCHNL_RA_PACING_CATHODE_ELECTRODE_1: NORMAL
MDC_IDC_SET_LEADCHNL_RA_PACING_CATHODE_LOCATION_1: NORMAL
MDC_IDC_SET_LEADCHNL_RA_PACING_POLARITY: NORMAL
MDC_IDC_SET_LEADCHNL_RA_PACING_PULSEWIDTH: 0.4 MS
MDC_IDC_SET_LEADCHNL_RA_SENSING_ANODE_ELECTRODE_1: NORMAL
MDC_IDC_SET_LEADCHNL_RA_SENSING_ANODE_LOCATION_1: NORMAL
MDC_IDC_SET_LEADCHNL_RA_SENSING_CATHODE_ELECTRODE_1: NORMAL
MDC_IDC_SET_LEADCHNL_RA_SENSING_CATHODE_LOCATION_1: NORMAL
MDC_IDC_SET_LEADCHNL_RA_SENSING_POLARITY: NORMAL
MDC_IDC_SET_LEADCHNL_RA_SENSING_SENSITIVITY: 0.3 MV
MDC_IDC_SET_LEADCHNL_RV_PACING_AMPLITUDE: 2 V
MDC_IDC_SET_LEADCHNL_RV_PACING_ANODE_ELECTRODE_1: NORMAL
MDC_IDC_SET_LEADCHNL_RV_PACING_ANODE_LOCATION_1: NORMAL
MDC_IDC_SET_LEADCHNL_RV_PACING_CAPTURE_MODE: NORMAL
MDC_IDC_SET_LEADCHNL_RV_PACING_CATHODE_ELECTRODE_1: NORMAL
MDC_IDC_SET_LEADCHNL_RV_PACING_CATHODE_LOCATION_1: NORMAL
MDC_IDC_SET_LEADCHNL_RV_PACING_POLARITY: NORMAL
MDC_IDC_SET_LEADCHNL_RV_PACING_PULSEWIDTH: 0.4 MS
MDC_IDC_SET_LEADCHNL_RV_SENSING_ANODE_ELECTRODE_1: NORMAL
MDC_IDC_SET_LEADCHNL_RV_SENSING_ANODE_LOCATION_1: NORMAL
MDC_IDC_SET_LEADCHNL_RV_SENSING_CATHODE_ELECTRODE_1: NORMAL
MDC_IDC_SET_LEADCHNL_RV_SENSING_CATHODE_LOCATION_1: NORMAL
MDC_IDC_SET_LEADCHNL_RV_SENSING_POLARITY: NORMAL
MDC_IDC_SET_LEADCHNL_RV_SENSING_SENSITIVITY: 0.9 MV
MDC_IDC_SET_ZONE_DETECTION_INTERVAL: 200 MS
MDC_IDC_SET_ZONE_DETECTION_INTERVAL: 350 MS
MDC_IDC_SET_ZONE_DETECTION_INTERVAL: 400 MS
MDC_IDC_SET_ZONE_TYPE: NORMAL
MDC_IDC_STAT_AT_BURDEN_PERCENT: 0.2 %
MDC_IDC_STAT_AT_DTM_END: NORMAL
MDC_IDC_STAT_AT_DTM_START: NORMAL
MDC_IDC_STAT_BRADY_AP_VP_PERCENT: 32.24 %
MDC_IDC_STAT_BRADY_AP_VS_PERCENT: 0 %
MDC_IDC_STAT_BRADY_AS_VP_PERCENT: 67.69 %
MDC_IDC_STAT_BRADY_AS_VS_PERCENT: 0.07 %
MDC_IDC_STAT_BRADY_DTM_END: NORMAL
MDC_IDC_STAT_BRADY_DTM_START: NORMAL
MDC_IDC_STAT_BRADY_RA_PERCENT_PACED: 32.17 %
MDC_IDC_STAT_BRADY_RV_PERCENT_PACED: 99.89 %
MDC_IDC_STAT_EPISODE_RECENT_COUNT: 0
MDC_IDC_STAT_EPISODE_RECENT_COUNT: 30
MDC_IDC_STAT_EPISODE_RECENT_COUNT_DTM_END: NORMAL
MDC_IDC_STAT_EPISODE_RECENT_COUNT_DTM_START: NORMAL
MDC_IDC_STAT_EPISODE_TOTAL_COUNT: 0
MDC_IDC_STAT_EPISODE_TOTAL_COUNT: 0
MDC_IDC_STAT_EPISODE_TOTAL_COUNT: 1
MDC_IDC_STAT_EPISODE_TOTAL_COUNT: 2499
MDC_IDC_STAT_EPISODE_TOTAL_COUNT_DTM_END: NORMAL
MDC_IDC_STAT_EPISODE_TOTAL_COUNT_DTM_START: NORMAL
MDC_IDC_STAT_EPISODE_TYPE: NORMAL

## 2022-10-27 ENCOUNTER — ANCILLARY PROCEDURE (OUTPATIENT)
Dept: CARDIOLOGY | Facility: CLINIC | Age: 87
End: 2022-10-27
Attending: INTERNAL MEDICINE
Payer: COMMERCIAL

## 2022-10-27 DIAGNOSIS — I44.30 AV BLOCK: ICD-10-CM

## 2022-10-27 PROCEDURE — 99207 CARDIAC DEVICE CHECK - REMOTE: CPT | Performed by: INTERNAL MEDICINE

## 2022-10-27 PROCEDURE — 93296 REM INTERROG EVL PM/IDS: CPT

## 2022-12-19 ENCOUNTER — ANCILLARY PROCEDURE (OUTPATIENT)
Dept: CARDIOLOGY | Facility: CLINIC | Age: 87
End: 2022-12-19
Attending: INTERNAL MEDICINE
Payer: MEDICARE

## 2022-12-19 DIAGNOSIS — I44.30 AV BLOCK: ICD-10-CM

## 2022-12-19 PROCEDURE — 93294 REM INTERROG EVL PM/LDLS PM: CPT | Mod: GW | Performed by: INTERNAL MEDICINE

## 2022-12-21 LAB
MDC_IDC_EPISODE_DTM: NORMAL
MDC_IDC_EPISODE_DURATION: 103 S
MDC_IDC_EPISODE_DURATION: 108 S
MDC_IDC_EPISODE_DURATION: 131 S
MDC_IDC_EPISODE_DURATION: 1383 S
MDC_IDC_EPISODE_DURATION: 142 S
MDC_IDC_EPISODE_DURATION: 167 S
MDC_IDC_EPISODE_DURATION: 2161 S
MDC_IDC_EPISODE_DURATION: 270 S
MDC_IDC_EPISODE_DURATION: 2768 S
MDC_IDC_EPISODE_DURATION: 33 S
MDC_IDC_EPISODE_DURATION: 365 S
MDC_IDC_EPISODE_DURATION: 38 S
MDC_IDC_EPISODE_DURATION: 448 S
MDC_IDC_EPISODE_DURATION: 479 S
MDC_IDC_EPISODE_DURATION: 48 S
MDC_IDC_EPISODE_DURATION: 49 S
MDC_IDC_EPISODE_DURATION: 516 S
MDC_IDC_EPISODE_DURATION: 53 S
MDC_IDC_EPISODE_DURATION: 555 S
MDC_IDC_EPISODE_DURATION: 63 S
MDC_IDC_EPISODE_DURATION: 656 S
MDC_IDC_EPISODE_DURATION: 74 S
MDC_IDC_EPISODE_DURATION: 875 S
MDC_IDC_EPISODE_DURATION: 93 S
MDC_IDC_EPISODE_DURATION: 94 S
MDC_IDC_EPISODE_DURATION: 97 S
MDC_IDC_EPISODE_DURATION: NORMAL S
MDC_IDC_EPISODE_ID: 1883
MDC_IDC_EPISODE_ID: 1884
MDC_IDC_EPISODE_ID: 1885
MDC_IDC_EPISODE_ID: 1886
MDC_IDC_EPISODE_ID: 1887
MDC_IDC_EPISODE_ID: 1888
MDC_IDC_EPISODE_ID: 1889
MDC_IDC_EPISODE_ID: 1890
MDC_IDC_EPISODE_ID: 1891
MDC_IDC_EPISODE_ID: 1892
MDC_IDC_EPISODE_ID: 1893
MDC_IDC_EPISODE_ID: 1894
MDC_IDC_EPISODE_ID: 1895
MDC_IDC_EPISODE_ID: 1896
MDC_IDC_EPISODE_ID: 1897
MDC_IDC_EPISODE_ID: 1898
MDC_IDC_EPISODE_ID: 1899
MDC_IDC_EPISODE_ID: 1900
MDC_IDC_EPISODE_ID: 1901
MDC_IDC_EPISODE_ID: 1902
MDC_IDC_EPISODE_ID: 1903
MDC_IDC_EPISODE_ID: 1904
MDC_IDC_EPISODE_ID: 1905
MDC_IDC_EPISODE_ID: 1906
MDC_IDC_EPISODE_ID: 1907
MDC_IDC_EPISODE_ID: 1908
MDC_IDC_EPISODE_ID: 1909
MDC_IDC_EPISODE_TYPE: NORMAL
MDC_IDC_LEAD_IMPLANT_DT: NORMAL
MDC_IDC_LEAD_IMPLANT_DT: NORMAL
MDC_IDC_LEAD_LOCATION: NORMAL
MDC_IDC_LEAD_LOCATION: NORMAL
MDC_IDC_LEAD_LOCATION_DETAIL_1: NORMAL
MDC_IDC_LEAD_LOCATION_DETAIL_1: NORMAL
MDC_IDC_LEAD_MFG: NORMAL
MDC_IDC_LEAD_MFG: NORMAL
MDC_IDC_LEAD_MODEL: NORMAL
MDC_IDC_LEAD_MODEL: NORMAL
MDC_IDC_LEAD_POLARITY_TYPE: NORMAL
MDC_IDC_LEAD_POLARITY_TYPE: NORMAL
MDC_IDC_LEAD_SERIAL: NORMAL
MDC_IDC_LEAD_SERIAL: NORMAL
MDC_IDC_MSMT_BATTERY_DTM: NORMAL
MDC_IDC_MSMT_BATTERY_REMAINING_LONGEVITY: 11 MO
MDC_IDC_MSMT_BATTERY_RRT_TRIGGER: 2.83
MDC_IDC_MSMT_BATTERY_STATUS: NORMAL
MDC_IDC_MSMT_BATTERY_VOLTAGE: 2.88 V
MDC_IDC_MSMT_LEADCHNL_RA_IMPEDANCE_VALUE: 418 OHM
MDC_IDC_MSMT_LEADCHNL_RA_IMPEDANCE_VALUE: 437 OHM
MDC_IDC_MSMT_LEADCHNL_RA_PACING_THRESHOLD_AMPLITUDE: 0.5 V
MDC_IDC_MSMT_LEADCHNL_RA_PACING_THRESHOLD_PULSEWIDTH: 0.4 MS
MDC_IDC_MSMT_LEADCHNL_RA_SENSING_INTR_AMPL: 0.75 MV
MDC_IDC_MSMT_LEADCHNL_RA_SENSING_INTR_AMPL: 0.75 MV
MDC_IDC_MSMT_LEADCHNL_RV_IMPEDANCE_VALUE: 418 OHM
MDC_IDC_MSMT_LEADCHNL_RV_IMPEDANCE_VALUE: 494 OHM
MDC_IDC_MSMT_LEADCHNL_RV_PACING_THRESHOLD_AMPLITUDE: 0.88 V
MDC_IDC_MSMT_LEADCHNL_RV_PACING_THRESHOLD_PULSEWIDTH: 0.4 MS
MDC_IDC_MSMT_LEADCHNL_RV_SENSING_INTR_AMPL: 9.5 MV
MDC_IDC_MSMT_LEADCHNL_RV_SENSING_INTR_AMPL: 9.5 MV
MDC_IDC_PG_IMPLANT_DTM: NORMAL
MDC_IDC_PG_MFG: NORMAL
MDC_IDC_PG_MODEL: NORMAL
MDC_IDC_PG_SERIAL: NORMAL
MDC_IDC_PG_TYPE: NORMAL
MDC_IDC_SESS_CLINIC_NAME: NORMAL
MDC_IDC_SESS_DTM: NORMAL
MDC_IDC_SESS_TYPE: NORMAL
MDC_IDC_SET_BRADY_AT_MODE_SWITCH_RATE: 171 {BEATS}/MIN
MDC_IDC_SET_BRADY_HYSTRATE: NORMAL
MDC_IDC_SET_BRADY_LOWRATE: 60 {BEATS}/MIN
MDC_IDC_SET_BRADY_MAX_SENSOR_RATE: 120 {BEATS}/MIN
MDC_IDC_SET_BRADY_MAX_TRACKING_RATE: 120 {BEATS}/MIN
MDC_IDC_SET_BRADY_MODE: NORMAL
MDC_IDC_SET_BRADY_PAV_DELAY_LOW: 180 MS
MDC_IDC_SET_BRADY_SAV_DELAY_LOW: 150 MS
MDC_IDC_SET_LEADCHNL_RA_PACING_AMPLITUDE: 1.5 V
MDC_IDC_SET_LEADCHNL_RA_PACING_ANODE_ELECTRODE_1: NORMAL
MDC_IDC_SET_LEADCHNL_RA_PACING_ANODE_LOCATION_1: NORMAL
MDC_IDC_SET_LEADCHNL_RA_PACING_CAPTURE_MODE: NORMAL
MDC_IDC_SET_LEADCHNL_RA_PACING_CATHODE_ELECTRODE_1: NORMAL
MDC_IDC_SET_LEADCHNL_RA_PACING_CATHODE_LOCATION_1: NORMAL
MDC_IDC_SET_LEADCHNL_RA_PACING_POLARITY: NORMAL
MDC_IDC_SET_LEADCHNL_RA_PACING_PULSEWIDTH: 0.4 MS
MDC_IDC_SET_LEADCHNL_RA_SENSING_ANODE_ELECTRODE_1: NORMAL
MDC_IDC_SET_LEADCHNL_RA_SENSING_ANODE_LOCATION_1: NORMAL
MDC_IDC_SET_LEADCHNL_RA_SENSING_CATHODE_ELECTRODE_1: NORMAL
MDC_IDC_SET_LEADCHNL_RA_SENSING_CATHODE_LOCATION_1: NORMAL
MDC_IDC_SET_LEADCHNL_RA_SENSING_POLARITY: NORMAL
MDC_IDC_SET_LEADCHNL_RA_SENSING_SENSITIVITY: 0.3 MV
MDC_IDC_SET_LEADCHNL_RV_PACING_AMPLITUDE: 2 V
MDC_IDC_SET_LEADCHNL_RV_PACING_ANODE_ELECTRODE_1: NORMAL
MDC_IDC_SET_LEADCHNL_RV_PACING_ANODE_LOCATION_1: NORMAL
MDC_IDC_SET_LEADCHNL_RV_PACING_CAPTURE_MODE: NORMAL
MDC_IDC_SET_LEADCHNL_RV_PACING_CATHODE_ELECTRODE_1: NORMAL
MDC_IDC_SET_LEADCHNL_RV_PACING_CATHODE_LOCATION_1: NORMAL
MDC_IDC_SET_LEADCHNL_RV_PACING_POLARITY: NORMAL
MDC_IDC_SET_LEADCHNL_RV_PACING_PULSEWIDTH: 0.4 MS
MDC_IDC_SET_LEADCHNL_RV_SENSING_ANODE_ELECTRODE_1: NORMAL
MDC_IDC_SET_LEADCHNL_RV_SENSING_ANODE_LOCATION_1: NORMAL
MDC_IDC_SET_LEADCHNL_RV_SENSING_CATHODE_ELECTRODE_1: NORMAL
MDC_IDC_SET_LEADCHNL_RV_SENSING_CATHODE_LOCATION_1: NORMAL
MDC_IDC_SET_LEADCHNL_RV_SENSING_POLARITY: NORMAL
MDC_IDC_SET_LEADCHNL_RV_SENSING_SENSITIVITY: 0.9 MV
MDC_IDC_SET_ZONE_DETECTION_INTERVAL: 200 MS
MDC_IDC_SET_ZONE_DETECTION_INTERVAL: 350 MS
MDC_IDC_SET_ZONE_DETECTION_INTERVAL: 400 MS
MDC_IDC_SET_ZONE_TYPE: NORMAL
MDC_IDC_STAT_AT_BURDEN_PERCENT: 0.5 %
MDC_IDC_STAT_AT_DTM_END: NORMAL
MDC_IDC_STAT_AT_DTM_START: NORMAL
MDC_IDC_STAT_BRADY_AP_VP_PERCENT: 24.06 %
MDC_IDC_STAT_BRADY_AP_VS_PERCENT: 0.01 %
MDC_IDC_STAT_BRADY_AS_VP_PERCENT: 75.8 %
MDC_IDC_STAT_BRADY_AS_VS_PERCENT: 0.14 %
MDC_IDC_STAT_BRADY_DTM_END: NORMAL
MDC_IDC_STAT_BRADY_DTM_START: NORMAL
MDC_IDC_STAT_BRADY_RA_PERCENT_PACED: 23.98 %
MDC_IDC_STAT_BRADY_RV_PERCENT_PACED: 99.8 %
MDC_IDC_STAT_EPISODE_RECENT_COUNT: 0
MDC_IDC_STAT_EPISODE_RECENT_COUNT: 27
MDC_IDC_STAT_EPISODE_RECENT_COUNT_DTM_END: NORMAL
MDC_IDC_STAT_EPISODE_RECENT_COUNT_DTM_START: NORMAL
MDC_IDC_STAT_EPISODE_TOTAL_COUNT: 0
MDC_IDC_STAT_EPISODE_TOTAL_COUNT: 0
MDC_IDC_STAT_EPISODE_TOTAL_COUNT: 1
MDC_IDC_STAT_EPISODE_TOTAL_COUNT: 3084
MDC_IDC_STAT_EPISODE_TOTAL_COUNT_DTM_END: NORMAL
MDC_IDC_STAT_EPISODE_TOTAL_COUNT_DTM_START: NORMAL
MDC_IDC_STAT_EPISODE_TYPE: NORMAL

## 2023-01-01 ENCOUNTER — ANCILLARY PROCEDURE (OUTPATIENT)
Dept: CARDIOLOGY | Facility: CLINIC | Age: 88
End: 2023-01-01
Attending: INTERNAL MEDICINE
Payer: MEDICARE

## 2023-01-01 ENCOUNTER — PATIENT OUTREACH (OUTPATIENT)
Dept: CARDIOLOGY | Facility: CLINIC | Age: 88
End: 2023-01-01
Payer: MEDICARE

## 2023-01-01 ENCOUNTER — APPOINTMENT (OUTPATIENT)
Dept: LAB | Facility: CLINIC | Age: 88
End: 2023-01-01
Attending: INTERNAL MEDICINE
Payer: MEDICARE

## 2023-01-01 ENCOUNTER — TELEPHONE (OUTPATIENT)
Dept: CARDIOLOGY | Facility: CLINIC | Age: 88
End: 2023-01-01
Payer: MEDICARE

## 2023-01-01 ENCOUNTER — ANCILLARY PROCEDURE (OUTPATIENT)
Dept: CARDIOLOGY | Facility: CLINIC | Age: 88
End: 2023-01-01
Attending: NURSE PRACTITIONER
Payer: MEDICARE

## 2023-01-01 ENCOUNTER — CARE COORDINATION (OUTPATIENT)
Dept: CARDIOLOGY | Facility: CLINIC | Age: 88
End: 2023-01-01

## 2023-01-01 ENCOUNTER — HOSPITAL ENCOUNTER (OUTPATIENT)
Facility: CLINIC | Age: 88
Discharge: NURSING, REHAB OR LONG-TERM CARE | End: 2023-10-30
Attending: INTERNAL MEDICINE | Admitting: INTERNAL MEDICINE
Payer: MEDICARE

## 2023-01-01 ENCOUNTER — APPOINTMENT (OUTPATIENT)
Dept: MEDSURG UNIT | Facility: CLINIC | Age: 88
End: 2023-01-01
Attending: INTERNAL MEDICINE
Payer: MEDICARE

## 2023-01-01 ENCOUNTER — HEALTH MAINTENANCE LETTER (OUTPATIENT)
Age: 88
End: 2023-01-01

## 2023-01-01 VITALS — BODY MASS INDEX: 24.8 KG/M2 | WEIGHT: 140 LBS | HEIGHT: 63 IN

## 2023-01-01 VITALS
HEART RATE: 73 BPM | RESPIRATION RATE: 16 BRPM | OXYGEN SATURATION: 95 % | TEMPERATURE: 98.2 F | DIASTOLIC BLOOD PRESSURE: 84 MMHG | SYSTOLIC BLOOD PRESSURE: 181 MMHG

## 2023-01-01 DIAGNOSIS — Z45.010 PACEMAKER GENERATOR END OF LIFE: Primary | ICD-10-CM

## 2023-01-01 DIAGNOSIS — I44.30 AV BLOCK: ICD-10-CM

## 2023-01-01 DIAGNOSIS — I44.2 ATRIOVENTRICULAR BLOCK, COMPLETE (H): ICD-10-CM

## 2023-01-01 DIAGNOSIS — Z45.010 PACEMAKER GENERATOR END OF LIFE: ICD-10-CM

## 2023-01-01 DIAGNOSIS — I44.2 CHB (COMPLETE HEART BLOCK) (H): Primary | ICD-10-CM

## 2023-01-01 DIAGNOSIS — I44.2 CHB (COMPLETE HEART BLOCK) (H): ICD-10-CM

## 2023-01-01 LAB
ANION GAP SERPL CALCULATED.3IONS-SCNC: 12 MMOL/L (ref 7–15)
ATRIAL RATE - MUSE: 60 BPM
ATRIAL RATE - MUSE: 71 BPM
BUN SERPL-MCNC: 23.9 MG/DL (ref 8–23)
CALCIUM SERPL-MCNC: 9.5 MG/DL (ref 8.2–9.6)
CHLORIDE SERPL-SCNC: 106 MMOL/L (ref 98–107)
CREAT SERPL-MCNC: 1.42 MG/DL (ref 0.51–0.95)
DEPRECATED HCO3 PLAS-SCNC: 23 MMOL/L (ref 22–29)
DIASTOLIC BLOOD PRESSURE - MUSE: NORMAL MMHG
DIASTOLIC BLOOD PRESSURE - MUSE: NORMAL MMHG
EGFRCR SERPLBLD CKD-EPI 2021: 34 ML/MIN/1.73M2
ERYTHROCYTE [DISTWIDTH] IN BLOOD BY AUTOMATED COUNT: 15.2 % (ref 10–15)
GLUCOSE SERPL-MCNC: 181 MG/DL (ref 70–99)
HCT VFR BLD AUTO: 44.8 % (ref 35–47)
HGB BLD-MCNC: 13.6 G/DL (ref 11.7–15.7)
INTERPRETATION ECG - MUSE: NORMAL
INTERPRETATION ECG - MUSE: NORMAL
MCH RBC QN AUTO: 28.2 PG (ref 26.5–33)
MCHC RBC AUTO-ENTMCNC: 30.4 G/DL (ref 31.5–36.5)
MCV RBC AUTO: 93 FL (ref 78–100)
MDC_IDC_EPISODE_DTM: NORMAL
MDC_IDC_EPISODE_DURATION: 115 S
MDC_IDC_EPISODE_DURATION: 1270 S
MDC_IDC_EPISODE_DURATION: 17 S
MDC_IDC_EPISODE_DURATION: 187 S
MDC_IDC_EPISODE_DURATION: 1997 S
MDC_IDC_EPISODE_DURATION: 2067 S
MDC_IDC_EPISODE_DURATION: 224 S
MDC_IDC_EPISODE_DURATION: 226 S
MDC_IDC_EPISODE_DURATION: 27 S
MDC_IDC_EPISODE_DURATION: 271 S
MDC_IDC_EPISODE_DURATION: 29 S
MDC_IDC_EPISODE_DURATION: 342 S
MDC_IDC_EPISODE_DURATION: 37 S
MDC_IDC_EPISODE_DURATION: 424 S
MDC_IDC_EPISODE_DURATION: 521 S
MDC_IDC_EPISODE_DURATION: 54 S
MDC_IDC_EPISODE_DURATION: 633 S
MDC_IDC_EPISODE_DURATION: 64 S
MDC_IDC_EPISODE_DURATION: 79 S
MDC_IDC_EPISODE_DURATION: 834 S
MDC_IDC_EPISODE_ID: 1919
MDC_IDC_EPISODE_ID: 1920
MDC_IDC_EPISODE_ID: 1921
MDC_IDC_EPISODE_ID: 1922
MDC_IDC_EPISODE_ID: 1923
MDC_IDC_EPISODE_ID: 1924
MDC_IDC_EPISODE_ID: 1925
MDC_IDC_EPISODE_ID: 1926
MDC_IDC_EPISODE_ID: 1927
MDC_IDC_EPISODE_ID: 1928
MDC_IDC_EPISODE_ID: 1929
MDC_IDC_EPISODE_ID: 1930
MDC_IDC_EPISODE_ID: 1931
MDC_IDC_EPISODE_ID: 1932
MDC_IDC_EPISODE_ID: 1933
MDC_IDC_EPISODE_ID: 1934
MDC_IDC_EPISODE_ID: 1935
MDC_IDC_EPISODE_ID: 1936
MDC_IDC_EPISODE_ID: 1937
MDC_IDC_EPISODE_ID: 1938
MDC_IDC_EPISODE_TYPE: NORMAL
MDC_IDC_LEAD_CONNECTION_STATUS: NORMAL
MDC_IDC_LEAD_IMPLANT_DT: NORMAL
MDC_IDC_LEAD_LOCATION: NORMAL
MDC_IDC_LEAD_LOCATION_DETAIL_1: NORMAL
MDC_IDC_LEAD_MFG: NORMAL
MDC_IDC_LEAD_MODEL: NORMAL
MDC_IDC_LEAD_POLARITY_TYPE: NORMAL
MDC_IDC_LEAD_SERIAL: NORMAL
MDC_IDC_MSMT_BATTERY_DTM: NORMAL
MDC_IDC_MSMT_BATTERY_REMAINING_LONGEVITY: 152 MO
MDC_IDC_MSMT_BATTERY_REMAINING_LONGEVITY: 2 MO
MDC_IDC_MSMT_BATTERY_REMAINING_LONGEVITY: 2 MO
MDC_IDC_MSMT_BATTERY_REMAINING_LONGEVITY: 4 MO
MDC_IDC_MSMT_BATTERY_REMAINING_LONGEVITY: 5 MO
MDC_IDC_MSMT_BATTERY_RRT_TRIGGER: 2.62
MDC_IDC_MSMT_BATTERY_RRT_TRIGGER: 2.62
MDC_IDC_MSMT_BATTERY_RRT_TRIGGER: 2.83
MDC_IDC_MSMT_BATTERY_STATUS: NORMAL
MDC_IDC_MSMT_BATTERY_VOLTAGE: 2.83 V
MDC_IDC_MSMT_BATTERY_VOLTAGE: 2.84 V
MDC_IDC_MSMT_BATTERY_VOLTAGE: 2.85 V
MDC_IDC_MSMT_BATTERY_VOLTAGE: 2.86 V
MDC_IDC_MSMT_BATTERY_VOLTAGE: 3.17 V
MDC_IDC_MSMT_BATTERY_VOLTAGE: 3.22 V
MDC_IDC_MSMT_LEADCHNL_RA_IMPEDANCE_VALUE: 380 OHM
MDC_IDC_MSMT_LEADCHNL_RA_IMPEDANCE_VALUE: 380 OHM
MDC_IDC_MSMT_LEADCHNL_RA_IMPEDANCE_VALUE: 399 OHM
MDC_IDC_MSMT_LEADCHNL_RA_IMPEDANCE_VALUE: 418 OHM
MDC_IDC_MSMT_LEADCHNL_RA_IMPEDANCE_VALUE: 437 OHM
MDC_IDC_MSMT_LEADCHNL_RA_IMPEDANCE_VALUE: 456 OHM
MDC_IDC_MSMT_LEADCHNL_RA_PACING_THRESHOLD_AMPLITUDE: 0.5 V
MDC_IDC_MSMT_LEADCHNL_RA_PACING_THRESHOLD_AMPLITUDE: 0.62 V
MDC_IDC_MSMT_LEADCHNL_RA_PACING_THRESHOLD_PULSEWIDTH: 0.4 MS
MDC_IDC_MSMT_LEADCHNL_RA_SENSING_INTR_AMPL: 0.88 MV
MDC_IDC_MSMT_LEADCHNL_RA_SENSING_INTR_AMPL: 1 MV
MDC_IDC_MSMT_LEADCHNL_RA_SENSING_INTR_AMPL: 1.38 MV
MDC_IDC_MSMT_LEADCHNL_RA_SENSING_INTR_AMPL: 1.38 MV
MDC_IDC_MSMT_LEADCHNL_RA_SENSING_INTR_AMPL: 1.5 MV
MDC_IDC_MSMT_LEADCHNL_RA_SENSING_INTR_AMPL: 1.8 MV
MDC_IDC_MSMT_LEADCHNL_RV_IMPEDANCE_VALUE: 418 OHM
MDC_IDC_MSMT_LEADCHNL_RV_IMPEDANCE_VALUE: 456 OHM
MDC_IDC_MSMT_LEADCHNL_RV_IMPEDANCE_VALUE: 475 OHM
MDC_IDC_MSMT_LEADCHNL_RV_IMPEDANCE_VALUE: 475 OHM
MDC_IDC_MSMT_LEADCHNL_RV_IMPEDANCE_VALUE: 494 OHM
MDC_IDC_MSMT_LEADCHNL_RV_IMPEDANCE_VALUE: 513 OHM
MDC_IDC_MSMT_LEADCHNL_RV_IMPEDANCE_VALUE: 532 OHM
MDC_IDC_MSMT_LEADCHNL_RV_IMPEDANCE_VALUE: 551 OHM
MDC_IDC_MSMT_LEADCHNL_RV_IMPEDANCE_VALUE: 551 OHM
MDC_IDC_MSMT_LEADCHNL_RV_IMPEDANCE_VALUE: 589 OHM
MDC_IDC_MSMT_LEADCHNL_RV_PACING_THRESHOLD_AMPLITUDE: 0.75 V
MDC_IDC_MSMT_LEADCHNL_RV_PACING_THRESHOLD_AMPLITUDE: 0.88 V
MDC_IDC_MSMT_LEADCHNL_RV_PACING_THRESHOLD_PULSEWIDTH: 0.4 MS
MDC_IDC_MSMT_LEADCHNL_RV_SENSING_INTR_AMPL: 11.38 MV
MDC_IDC_MSMT_LEADCHNL_RV_SENSING_INTR_AMPL: 11.38 MV
MDC_IDC_MSMT_LEADCHNL_RV_SENSING_INTR_AMPL: 7.1 MV
MDC_IDC_MSMT_LEADCHNL_RV_SENSING_INTR_AMPL: 8 MV
MDC_IDC_PG_IMPLANT_DTM: NORMAL
MDC_IDC_PG_MFG: NORMAL
MDC_IDC_PG_MODEL: NORMAL
MDC_IDC_PG_SERIAL: NORMAL
MDC_IDC_PG_TYPE: NORMAL
MDC_IDC_SESS_CLINIC_NAME: NORMAL
MDC_IDC_SESS_DTM: NORMAL
MDC_IDC_SESS_TYPE: NORMAL
MDC_IDC_SET_BRADY_AT_MODE_SWITCH_RATE: 171 {BEATS}/MIN
MDC_IDC_SET_BRADY_HYSTRATE: NORMAL
MDC_IDC_SET_BRADY_LOWRATE: 60 {BEATS}/MIN
MDC_IDC_SET_BRADY_MAX_SENSOR_RATE: 120 {BEATS}/MIN
MDC_IDC_SET_BRADY_MAX_TRACKING_RATE: 120 {BEATS}/MIN
MDC_IDC_SET_BRADY_MODE: NORMAL
MDC_IDC_SET_BRADY_PAV_DELAY_LOW: 180 MS
MDC_IDC_SET_BRADY_SAV_DELAY_LOW: 150 MS
MDC_IDC_SET_LEADCHNL_RA_PACING_AMPLITUDE: 1.5 V
MDC_IDC_SET_LEADCHNL_RA_PACING_ANODE_ELECTRODE_1: NORMAL
MDC_IDC_SET_LEADCHNL_RA_PACING_ANODE_LOCATION_1: NORMAL
MDC_IDC_SET_LEADCHNL_RA_PACING_CAPTURE_MODE: NORMAL
MDC_IDC_SET_LEADCHNL_RA_PACING_CATHODE_ELECTRODE_1: NORMAL
MDC_IDC_SET_LEADCHNL_RA_PACING_CATHODE_LOCATION_1: NORMAL
MDC_IDC_SET_LEADCHNL_RA_PACING_POLARITY: NORMAL
MDC_IDC_SET_LEADCHNL_RA_PACING_PULSEWIDTH: 0.4 MS
MDC_IDC_SET_LEADCHNL_RA_SENSING_ANODE_ELECTRODE_1: NORMAL
MDC_IDC_SET_LEADCHNL_RA_SENSING_ANODE_LOCATION_1: NORMAL
MDC_IDC_SET_LEADCHNL_RA_SENSING_CATHODE_ELECTRODE_1: NORMAL
MDC_IDC_SET_LEADCHNL_RA_SENSING_CATHODE_LOCATION_1: NORMAL
MDC_IDC_SET_LEADCHNL_RA_SENSING_POLARITY: NORMAL
MDC_IDC_SET_LEADCHNL_RA_SENSING_SENSITIVITY: 0.3 MV
MDC_IDC_SET_LEADCHNL_RV_PACING_AMPLITUDE: 2 V
MDC_IDC_SET_LEADCHNL_RV_PACING_ANODE_ELECTRODE_1: NORMAL
MDC_IDC_SET_LEADCHNL_RV_PACING_ANODE_LOCATION_1: NORMAL
MDC_IDC_SET_LEADCHNL_RV_PACING_CAPTURE_MODE: NORMAL
MDC_IDC_SET_LEADCHNL_RV_PACING_CATHODE_ELECTRODE_1: NORMAL
MDC_IDC_SET_LEADCHNL_RV_PACING_CATHODE_LOCATION_1: NORMAL
MDC_IDC_SET_LEADCHNL_RV_PACING_POLARITY: NORMAL
MDC_IDC_SET_LEADCHNL_RV_PACING_PULSEWIDTH: 0.4 MS
MDC_IDC_SET_LEADCHNL_RV_SENSING_ANODE_ELECTRODE_1: NORMAL
MDC_IDC_SET_LEADCHNL_RV_SENSING_ANODE_LOCATION_1: NORMAL
MDC_IDC_SET_LEADCHNL_RV_SENSING_CATHODE_ELECTRODE_1: NORMAL
MDC_IDC_SET_LEADCHNL_RV_SENSING_CATHODE_LOCATION_1: NORMAL
MDC_IDC_SET_LEADCHNL_RV_SENSING_POLARITY: NORMAL
MDC_IDC_SET_LEADCHNL_RV_SENSING_SENSITIVITY: 0.9 MV
MDC_IDC_SET_ZONE_DETECTION_INTERVAL: 200 MS
MDC_IDC_SET_ZONE_DETECTION_INTERVAL: 350 MS
MDC_IDC_SET_ZONE_DETECTION_INTERVAL: 400 MS
MDC_IDC_SET_ZONE_STATUS: NORMAL
MDC_IDC_SET_ZONE_TYPE: NORMAL
MDC_IDC_SET_ZONE_VENDOR_TYPE: NORMAL
MDC_IDC_STAT_AT_BURDEN_PERCENT: 0 %
MDC_IDC_STAT_AT_BURDEN_PERCENT: 0.1 %
MDC_IDC_STAT_AT_BURDEN_PERCENT: 0.1 %
MDC_IDC_STAT_AT_BURDEN_PERCENT: 0.2 %
MDC_IDC_STAT_AT_DTM_END: NORMAL
MDC_IDC_STAT_AT_DTM_START: NORMAL
MDC_IDC_STAT_BRADY_AP_VP_PERCENT: 18.84 %
MDC_IDC_STAT_BRADY_AP_VP_PERCENT: 21.18 %
MDC_IDC_STAT_BRADY_AP_VP_PERCENT: 26.66 %
MDC_IDC_STAT_BRADY_AP_VP_PERCENT: 37.11 %
MDC_IDC_STAT_BRADY_AP_VP_PERCENT: 37.7 %
MDC_IDC_STAT_BRADY_AP_VP_PERCENT: 39.99 %
MDC_IDC_STAT_BRADY_AP_VS_PERCENT: 0 %
MDC_IDC_STAT_BRADY_AS_VP_PERCENT: 60 %
MDC_IDC_STAT_BRADY_AS_VP_PERCENT: 62.28 %
MDC_IDC_STAT_BRADY_AS_VP_PERCENT: 62.88 %
MDC_IDC_STAT_BRADY_AS_VP_PERCENT: 73.3 %
MDC_IDC_STAT_BRADY_AS_VP_PERCENT: 78.82 %
MDC_IDC_STAT_BRADY_AS_VP_PERCENT: 81.02 %
MDC_IDC_STAT_BRADY_AS_VS_PERCENT: 0 %
MDC_IDC_STAT_BRADY_AS_VS_PERCENT: 0.01 %
MDC_IDC_STAT_BRADY_AS_VS_PERCENT: 0.01 %
MDC_IDC_STAT_BRADY_AS_VS_PERCENT: 0.02 %
MDC_IDC_STAT_BRADY_AS_VS_PERCENT: 0.04 %
MDC_IDC_STAT_BRADY_AS_VS_PERCENT: 0.13 %
MDC_IDC_STAT_BRADY_DTM_END: NORMAL
MDC_IDC_STAT_BRADY_DTM_START: NORMAL
MDC_IDC_STAT_BRADY_RA_PERCENT_PACED: 18.81 %
MDC_IDC_STAT_BRADY_RA_PERCENT_PACED: 21.06 %
MDC_IDC_STAT_BRADY_RA_PERCENT_PACED: 26.64 %
MDC_IDC_STAT_BRADY_RA_PERCENT_PACED: 37.03 %
MDC_IDC_STAT_BRADY_RA_PERCENT_PACED: 37.69 %
MDC_IDC_STAT_BRADY_RA_PERCENT_PACED: 39.96 %
MDC_IDC_STAT_BRADY_RV_PERCENT_PACED: 100 %
MDC_IDC_STAT_BRADY_RV_PERCENT_PACED: 99.83 %
MDC_IDC_STAT_BRADY_RV_PERCENT_PACED: 99.95 %
MDC_IDC_STAT_BRADY_RV_PERCENT_PACED: 99.97 %
MDC_IDC_STAT_BRADY_RV_PERCENT_PACED: 99.98 %
MDC_IDC_STAT_BRADY_RV_PERCENT_PACED: 99.99 %
MDC_IDC_STAT_EPISODE_RECENT_COUNT: 0
MDC_IDC_STAT_EPISODE_RECENT_COUNT: 1
MDC_IDC_STAT_EPISODE_RECENT_COUNT: 18
MDC_IDC_STAT_EPISODE_RECENT_COUNT: 3
MDC_IDC_STAT_EPISODE_RECENT_COUNT_DTM_END: NORMAL
MDC_IDC_STAT_EPISODE_RECENT_COUNT_DTM_START: NORMAL
MDC_IDC_STAT_EPISODE_TOTAL_COUNT: 0
MDC_IDC_STAT_EPISODE_TOTAL_COUNT: 1
MDC_IDC_STAT_EPISODE_TOTAL_COUNT: 3129
MDC_IDC_STAT_EPISODE_TOTAL_COUNT: 3130
MDC_IDC_STAT_EPISODE_TOTAL_COUNT: 3131
MDC_IDC_STAT_EPISODE_TOTAL_COUNT: 3131
MDC_IDC_STAT_EPISODE_TOTAL_COUNT_DTM_END: NORMAL
MDC_IDC_STAT_EPISODE_TOTAL_COUNT_DTM_START: NORMAL
MDC_IDC_STAT_EPISODE_TYPE: NORMAL
MDC_IDC_STAT_TACHYTHERAPY_RECENT_DTM_END: NORMAL
MDC_IDC_STAT_TACHYTHERAPY_RECENT_DTM_END: NORMAL
MDC_IDC_STAT_TACHYTHERAPY_RECENT_DTM_START: NORMAL
MDC_IDC_STAT_TACHYTHERAPY_RECENT_DTM_START: NORMAL
MDC_IDC_STAT_TACHYTHERAPY_TOTAL_DTM_END: NORMAL
MDC_IDC_STAT_TACHYTHERAPY_TOTAL_DTM_END: NORMAL
MDC_IDC_STAT_TACHYTHERAPY_TOTAL_DTM_START: NORMAL
MDC_IDC_STAT_TACHYTHERAPY_TOTAL_DTM_START: NORMAL
P AXIS - MUSE: 59 DEGREES
P AXIS - MUSE: NORMAL DEGREES
PLATELET # BLD AUTO: 198 10E3/UL (ref 150–450)
POTASSIUM SERPL-SCNC: 4.5 MMOL/L (ref 3.4–5.3)
PR INTERVAL - MUSE: 176 MS
PR INTERVAL - MUSE: 206 MS
QRS DURATION - MUSE: 150 MS
QRS DURATION - MUSE: 156 MS
QT - MUSE: 448 MS
QT - MUSE: 468 MS
QTC - MUSE: 468 MS
QTC - MUSE: 486 MS
R AXIS - MUSE: -70 DEGREES
R AXIS - MUSE: -71 DEGREES
RBC # BLD AUTO: 4.82 10E6/UL (ref 3.8–5.2)
SODIUM SERPL-SCNC: 141 MMOL/L (ref 135–145)
SYSTOLIC BLOOD PRESSURE - MUSE: NORMAL MMHG
SYSTOLIC BLOOD PRESSURE - MUSE: NORMAL MMHG
T AXIS - MUSE: 106 DEGREES
T AXIS - MUSE: 99 DEGREES
VENTRICULAR RATE- MUSE: 60 BPM
VENTRICULAR RATE- MUSE: 71 BPM
WBC # BLD AUTO: 9 10E3/UL (ref 4–11)

## 2023-01-01 PROCEDURE — 99207 CARDIAC DEVICE CHECK - INPATIENT: CPT | Mod: 26 | Performed by: INTERNAL MEDICINE

## 2023-01-01 PROCEDURE — 93005 ELECTROCARDIOGRAM TRACING: CPT

## 2023-01-01 PROCEDURE — 85027 COMPLETE CBC AUTOMATED: CPT | Performed by: INTERNAL MEDICINE

## 2023-01-01 PROCEDURE — 99207 CARDIAC DEVICE CHECK - REMOTE: CPT | Performed by: INTERNAL MEDICINE

## 2023-01-01 PROCEDURE — 93294 REM INTERROG EVL PM/LDLS PM: CPT | Performed by: INTERNAL MEDICINE

## 2023-01-01 PROCEDURE — 80048 BASIC METABOLIC PNL TOTAL CA: CPT | Performed by: INTERNAL MEDICINE

## 2023-01-01 PROCEDURE — 93296 REM INTERROG EVL PM/IDS: CPT

## 2023-01-01 PROCEDURE — 93280 PM DEVICE PROGR EVAL DUAL: CPT

## 2023-01-01 PROCEDURE — 250N000009 HC RX 250: Performed by: INTERNAL MEDICINE

## 2023-01-01 PROCEDURE — 999N000132 HC STATISTIC PP CARE STAGE 1

## 2023-01-01 PROCEDURE — 250N000011 HC RX IP 250 OP 636: Performed by: INTERNAL MEDICINE

## 2023-01-01 PROCEDURE — 999N000142 HC STATISTIC PROCEDURE PREP ONLY

## 2023-01-01 PROCEDURE — 99204 OFFICE O/P NEW MOD 45 MIN: CPT | Mod: 95 | Performed by: INTERNAL MEDICINE

## 2023-01-01 PROCEDURE — 99214 OFFICE O/P EST MOD 30 MIN: CPT | Mod: 95 | Performed by: INTERNAL MEDICINE

## 2023-01-01 PROCEDURE — 258N000003 HC RX IP 258 OP 636: Performed by: INTERNAL MEDICINE

## 2023-01-01 PROCEDURE — 36415 COLL VENOUS BLD VENIPUNCTURE: CPT | Performed by: INTERNAL MEDICINE

## 2023-01-01 PROCEDURE — 33228 REMV&REPLC PM GEN DUAL LEAD: CPT | Performed by: INTERNAL MEDICINE

## 2023-01-01 PROCEDURE — C1785 PMKR, DUAL, RATE-RESP: HCPCS | Performed by: INTERNAL MEDICINE

## 2023-01-01 PROCEDURE — 999N000054 HC STATISTIC EKG NON-CHARGEABLE

## 2023-01-01 PROCEDURE — 272N000001 HC OR GENERAL SUPPLY STERILE: Performed by: INTERNAL MEDICINE

## 2023-01-01 DEVICE — PACEMAKER AZURE MRI XT DR: Type: IMPLANTABLE DEVICE | Status: FUNCTIONAL

## 2023-01-01 RX ORDER — ONDANSETRON 4 MG/1
4 TABLET, FILM COATED ORAL EVERY 8 HOURS PRN
COMMUNITY

## 2023-01-01 RX ORDER — INSULIN GLARGINE 100 [IU]/ML
10 INJECTION, SOLUTION SUBCUTANEOUS AT BEDTIME
COMMUNITY
Start: 2023-01-01

## 2023-01-01 RX ORDER — NALOXONE HYDROCHLORIDE 0.4 MG/ML
0.2 INJECTION, SOLUTION INTRAMUSCULAR; INTRAVENOUS; SUBCUTANEOUS
Status: DISCONTINUED | OUTPATIENT
Start: 2023-01-01 | End: 2023-01-01 | Stop reason: HOSPADM

## 2023-01-01 RX ORDER — CEFAZOLIN SODIUM 2 G/100ML
2 INJECTION, SOLUTION INTRAVENOUS
Status: DISCONTINUED | OUTPATIENT
Start: 2023-01-01 | End: 2023-01-01

## 2023-01-01 RX ORDER — SODIUM CHLORIDE 9 MG/ML
INJECTION, SOLUTION INTRAVENOUS CONTINUOUS
Status: DISCONTINUED | OUTPATIENT
Start: 2023-01-01 | End: 2023-01-01

## 2023-01-01 RX ORDER — CEFAZOLIN SODIUM 2 G/100ML
INJECTION, SOLUTION INTRAVENOUS CONTINUOUS PRN
Status: DISCONTINUED | OUTPATIENT
Start: 2023-01-01 | End: 2023-01-01 | Stop reason: HOSPADM

## 2023-01-01 RX ORDER — SENNOSIDES 8.6 MG
1 TABLET ORAL 2 TIMES DAILY PRN
COMMUNITY
Start: 2023-01-01

## 2023-01-01 RX ORDER — NALOXONE HYDROCHLORIDE 0.4 MG/ML
0.4 INJECTION, SOLUTION INTRAMUSCULAR; INTRAVENOUS; SUBCUTANEOUS
Status: DISCONTINUED | OUTPATIENT
Start: 2023-01-01 | End: 2023-01-01 | Stop reason: HOSPADM

## 2023-01-01 RX ORDER — GABAPENTIN 100 MG/1
100 CAPSULE ORAL AT BEDTIME
COMMUNITY

## 2023-01-01 RX ORDER — LIDOCAINE 40 MG/G
CREAM TOPICAL
Status: DISCONTINUED | OUTPATIENT
Start: 2023-01-01 | End: 2023-01-01

## 2023-01-01 RX ORDER — CEPHALEXIN 500 MG/1
500 TABLET ORAL 3 TIMES DAILY
Qty: 15 TABLET | Refills: 0 | Status: SHIPPED | OUTPATIENT
Start: 2023-01-01 | End: 2023-01-01

## 2023-01-01 RX ORDER — CEFAZOLIN SODIUM 2 G/50ML
2 SOLUTION INTRAVENOUS
Status: CANCELLED | OUTPATIENT
Start: 2023-01-01

## 2023-01-01 RX ORDER — ALBUTEROL SULFATE 90 UG/1
2 AEROSOL, METERED RESPIRATORY (INHALATION) EVERY 6 HOURS PRN
COMMUNITY

## 2023-01-01 RX ORDER — ACETAMINOPHEN 500 MG
1000 TABLET ORAL EVERY 8 HOURS PRN
COMMUNITY
Start: 2022-11-02

## 2023-01-01 RX ORDER — OXYCODONE AND ACETAMINOPHEN 5; 325 MG/1; MG/1
1 TABLET ORAL EVERY 4 HOURS PRN
Status: DISCONTINUED | OUTPATIENT
Start: 2023-01-01 | End: 2023-01-01 | Stop reason: HOSPADM

## 2023-01-01 RX ORDER — LIDOCAINE HYDROCHLORIDE 20 MG/ML
INJECTION, SOLUTION INFILTRATION; PERINEURAL
Status: DISCONTINUED | OUTPATIENT
Start: 2023-01-01 | End: 2023-01-01 | Stop reason: HOSPADM

## 2023-01-01 RX ORDER — SODIUM CHLORIDE 9 MG/ML
INJECTION, SOLUTION INTRAVENOUS CONTINUOUS
Status: CANCELLED | OUTPATIENT
Start: 2023-01-01

## 2023-01-01 RX ORDER — LIDOCAINE 40 MG/G
CREAM TOPICAL
Status: CANCELLED | OUTPATIENT
Start: 2023-01-01

## 2023-01-01 RX ADMIN — SODIUM CHLORIDE: 9 INJECTION, SOLUTION INTRAVENOUS at 09:32

## 2023-01-01 ASSESSMENT — PAIN SCALES - GENERAL: PAINLEVEL: NO PAIN (0)

## 2023-01-01 ASSESSMENT — ACTIVITIES OF DAILY LIVING (ADL)
ADLS_ACUITY_SCORE: 18.25
ADLS_ACUITY_SCORE: 21.25

## 2023-01-12 LAB
MDC_IDC_EPISODE_DTM: NORMAL
MDC_IDC_EPISODE_DURATION: 100 S
MDC_IDC_EPISODE_DURATION: 103 S
MDC_IDC_EPISODE_DURATION: 106 S
MDC_IDC_EPISODE_DURATION: 11 S
MDC_IDC_EPISODE_DURATION: 110 S
MDC_IDC_EPISODE_DURATION: 112 S
MDC_IDC_EPISODE_DURATION: 112 S
MDC_IDC_EPISODE_DURATION: 1146 S
MDC_IDC_EPISODE_DURATION: 115 S
MDC_IDC_EPISODE_DURATION: 126 S
MDC_IDC_EPISODE_DURATION: 127 S
MDC_IDC_EPISODE_DURATION: 128 S
MDC_IDC_EPISODE_DURATION: 129 S
MDC_IDC_EPISODE_DURATION: 130 S
MDC_IDC_EPISODE_DURATION: 131 S
MDC_IDC_EPISODE_DURATION: 132 S
MDC_IDC_EPISODE_DURATION: 133 S
MDC_IDC_EPISODE_DURATION: 133 S
MDC_IDC_EPISODE_DURATION: 135 S
MDC_IDC_EPISODE_DURATION: 138 S
MDC_IDC_EPISODE_DURATION: 138 S
MDC_IDC_EPISODE_DURATION: 144 S
MDC_IDC_EPISODE_DURATION: 145 S
MDC_IDC_EPISODE_DURATION: 145 S
MDC_IDC_EPISODE_DURATION: 1475 S
MDC_IDC_EPISODE_DURATION: 149 S
MDC_IDC_EPISODE_DURATION: 1514 S
MDC_IDC_EPISODE_DURATION: 1533 S
MDC_IDC_EPISODE_DURATION: 157 S
MDC_IDC_EPISODE_DURATION: 159 S
MDC_IDC_EPISODE_DURATION: 164 S
MDC_IDC_EPISODE_DURATION: 165 S
MDC_IDC_EPISODE_DURATION: 166 S
MDC_IDC_EPISODE_DURATION: 167 S
MDC_IDC_EPISODE_DURATION: 174 S
MDC_IDC_EPISODE_DURATION: 176 S
MDC_IDC_EPISODE_DURATION: 18 S
MDC_IDC_EPISODE_DURATION: 1873 S
MDC_IDC_EPISODE_DURATION: 202 S
MDC_IDC_EPISODE_DURATION: 203 S
MDC_IDC_EPISODE_DURATION: 209 S
MDC_IDC_EPISODE_DURATION: 217 S
MDC_IDC_EPISODE_DURATION: 2196 S
MDC_IDC_EPISODE_DURATION: 2225 S
MDC_IDC_EPISODE_DURATION: 224 S
MDC_IDC_EPISODE_DURATION: 228 S
MDC_IDC_EPISODE_DURATION: 23 S
MDC_IDC_EPISODE_DURATION: 2309 S
MDC_IDC_EPISODE_DURATION: 231 S
MDC_IDC_EPISODE_DURATION: 249 S
MDC_IDC_EPISODE_DURATION: 269 S
MDC_IDC_EPISODE_DURATION: 269 S
MDC_IDC_EPISODE_DURATION: 2759 S
MDC_IDC_EPISODE_DURATION: 276 S
MDC_IDC_EPISODE_DURATION: 276 S
MDC_IDC_EPISODE_DURATION: 28 S
MDC_IDC_EPISODE_DURATION: 30 S
MDC_IDC_EPISODE_DURATION: 308 S
MDC_IDC_EPISODE_DURATION: 31 S
MDC_IDC_EPISODE_DURATION: 32 S
MDC_IDC_EPISODE_DURATION: 321 S
MDC_IDC_EPISODE_DURATION: 325 S
MDC_IDC_EPISODE_DURATION: 328 S
MDC_IDC_EPISODE_DURATION: 333 S
MDC_IDC_EPISODE_DURATION: 35 S
MDC_IDC_EPISODE_DURATION: 3574 S
MDC_IDC_EPISODE_DURATION: 358 S
MDC_IDC_EPISODE_DURATION: 362 S
MDC_IDC_EPISODE_DURATION: 363 S
MDC_IDC_EPISODE_DURATION: 37 S
MDC_IDC_EPISODE_DURATION: 38 S
MDC_IDC_EPISODE_DURATION: 38 S
MDC_IDC_EPISODE_DURATION: 382 S
MDC_IDC_EPISODE_DURATION: 39 S
MDC_IDC_EPISODE_DURATION: 393 S
MDC_IDC_EPISODE_DURATION: 40 S
MDC_IDC_EPISODE_DURATION: 40 S
MDC_IDC_EPISODE_DURATION: 41 S
MDC_IDC_EPISODE_DURATION: 42 S
MDC_IDC_EPISODE_DURATION: 423 S
MDC_IDC_EPISODE_DURATION: 46 S
MDC_IDC_EPISODE_DURATION: 479 S
MDC_IDC_EPISODE_DURATION: 489 S
MDC_IDC_EPISODE_DURATION: 489 S
MDC_IDC_EPISODE_DURATION: 5113 S
MDC_IDC_EPISODE_DURATION: 513 S
MDC_IDC_EPISODE_DURATION: 517 S
MDC_IDC_EPISODE_DURATION: 52 S
MDC_IDC_EPISODE_DURATION: 52 S
MDC_IDC_EPISODE_DURATION: 522 S
MDC_IDC_EPISODE_DURATION: 5273 S
MDC_IDC_EPISODE_DURATION: 54 S
MDC_IDC_EPISODE_DURATION: 545 S
MDC_IDC_EPISODE_DURATION: 55 S
MDC_IDC_EPISODE_DURATION: 5541 S
MDC_IDC_EPISODE_DURATION: 556 S
MDC_IDC_EPISODE_DURATION: 575 S
MDC_IDC_EPISODE_DURATION: 5755 S
MDC_IDC_EPISODE_DURATION: 579 S
MDC_IDC_EPISODE_DURATION: 58 S
MDC_IDC_EPISODE_DURATION: 590 S
MDC_IDC_EPISODE_DURATION: 616 S
MDC_IDC_EPISODE_DURATION: 627 S
MDC_IDC_EPISODE_DURATION: 633 S
MDC_IDC_EPISODE_DURATION: 65 S
MDC_IDC_EPISODE_DURATION: 66 S
MDC_IDC_EPISODE_DURATION: 6720 S
MDC_IDC_EPISODE_DURATION: 673 S
MDC_IDC_EPISODE_DURATION: 68 S
MDC_IDC_EPISODE_DURATION: 688 S
MDC_IDC_EPISODE_DURATION: 69 S
MDC_IDC_EPISODE_DURATION: 7109 S
MDC_IDC_EPISODE_DURATION: 75 S
MDC_IDC_EPISODE_DURATION: 8132 S
MDC_IDC_EPISODE_DURATION: 851 S
MDC_IDC_EPISODE_DURATION: 852 S
MDC_IDC_EPISODE_DURATION: 87 S
MDC_IDC_EPISODE_DURATION: 89 S
MDC_IDC_EPISODE_DURATION: 90 S
MDC_IDC_EPISODE_DURATION: 90 S
MDC_IDC_EPISODE_DURATION: 91 S
MDC_IDC_EPISODE_DURATION: 92 S
MDC_IDC_EPISODE_DURATION: 92 S
MDC_IDC_EPISODE_DURATION: 95 S
MDC_IDC_EPISODE_DURATION: 95 S
MDC_IDC_EPISODE_DURATION: 96 S
MDC_IDC_EPISODE_DURATION: 96 S
MDC_IDC_EPISODE_DURATION: 97 S
MDC_IDC_EPISODE_DURATION: 98 S
MDC_IDC_EPISODE_DURATION: 9817 S
MDC_IDC_EPISODE_DURATION: NORMAL S
MDC_IDC_EPISODE_ID: 1592
MDC_IDC_EPISODE_ID: 1607
MDC_IDC_EPISODE_ID: 1614
MDC_IDC_EPISODE_ID: 1619
MDC_IDC_EPISODE_ID: 1620
MDC_IDC_EPISODE_ID: 1624
MDC_IDC_EPISODE_ID: 1625
MDC_IDC_EPISODE_ID: 1629
MDC_IDC_EPISODE_ID: 1630
MDC_IDC_EPISODE_ID: 1636
MDC_IDC_EPISODE_ID: 1638
MDC_IDC_EPISODE_ID: 1662
MDC_IDC_EPISODE_ID: 1663
MDC_IDC_EPISODE_ID: 1674
MDC_IDC_EPISODE_ID: 1680
MDC_IDC_EPISODE_ID: 1690
MDC_IDC_EPISODE_ID: 1694
MDC_IDC_EPISODE_ID: 1697
MDC_IDC_EPISODE_ID: 1699
MDC_IDC_EPISODE_ID: 1700
MDC_IDC_EPISODE_ID: 1701
MDC_IDC_EPISODE_ID: 1704
MDC_IDC_EPISODE_ID: 1706
MDC_IDC_EPISODE_ID: 1710
MDC_IDC_EPISODE_ID: 1711
MDC_IDC_EPISODE_ID: 1712
MDC_IDC_EPISODE_ID: 1716
MDC_IDC_EPISODE_ID: 1718
MDC_IDC_EPISODE_ID: 1719
MDC_IDC_EPISODE_ID: 1735
MDC_IDC_EPISODE_ID: 1754
MDC_IDC_EPISODE_ID: 1756
MDC_IDC_EPISODE_ID: 1762
MDC_IDC_EPISODE_ID: 1773
MDC_IDC_EPISODE_ID: 1774
MDC_IDC_EPISODE_ID: 1775
MDC_IDC_EPISODE_ID: 1776
MDC_IDC_EPISODE_ID: 1777
MDC_IDC_EPISODE_ID: 1778
MDC_IDC_EPISODE_ID: 1779
MDC_IDC_EPISODE_ID: 1780
MDC_IDC_EPISODE_ID: 1781
MDC_IDC_EPISODE_ID: 1782
MDC_IDC_EPISODE_ID: 1783
MDC_IDC_EPISODE_ID: 1784
MDC_IDC_EPISODE_ID: 1785
MDC_IDC_EPISODE_ID: 1786
MDC_IDC_EPISODE_ID: 1787
MDC_IDC_EPISODE_ID: 1788
MDC_IDC_EPISODE_ID: 1789
MDC_IDC_EPISODE_ID: 1790
MDC_IDC_EPISODE_ID: 1791
MDC_IDC_EPISODE_ID: 1792
MDC_IDC_EPISODE_ID: 1793
MDC_IDC_EPISODE_ID: 1794
MDC_IDC_EPISODE_ID: 1795
MDC_IDC_EPISODE_ID: 1796
MDC_IDC_EPISODE_ID: 1797
MDC_IDC_EPISODE_ID: 1798
MDC_IDC_EPISODE_ID: 1799
MDC_IDC_EPISODE_ID: 1800
MDC_IDC_EPISODE_ID: 1801
MDC_IDC_EPISODE_ID: 1802
MDC_IDC_EPISODE_ID: 1803
MDC_IDC_EPISODE_ID: 1804
MDC_IDC_EPISODE_ID: 1805
MDC_IDC_EPISODE_ID: 1806
MDC_IDC_EPISODE_ID: 1807
MDC_IDC_EPISODE_ID: 1808
MDC_IDC_EPISODE_ID: 1809
MDC_IDC_EPISODE_ID: 1810
MDC_IDC_EPISODE_ID: 1811
MDC_IDC_EPISODE_ID: 1812
MDC_IDC_EPISODE_ID: 1813
MDC_IDC_EPISODE_ID: 1814
MDC_IDC_EPISODE_ID: 1815
MDC_IDC_EPISODE_ID: 1816
MDC_IDC_EPISODE_ID: 1817
MDC_IDC_EPISODE_ID: 1818
MDC_IDC_EPISODE_ID: 1819
MDC_IDC_EPISODE_ID: 1820
MDC_IDC_EPISODE_ID: 1821
MDC_IDC_EPISODE_ID: 1822
MDC_IDC_EPISODE_ID: 1823
MDC_IDC_EPISODE_ID: 1824
MDC_IDC_EPISODE_ID: 1825
MDC_IDC_EPISODE_ID: 1826
MDC_IDC_EPISODE_ID: 1827
MDC_IDC_EPISODE_ID: 1828
MDC_IDC_EPISODE_ID: 1829
MDC_IDC_EPISODE_ID: 1830
MDC_IDC_EPISODE_ID: 1831
MDC_IDC_EPISODE_ID: 1832
MDC_IDC_EPISODE_ID: 1833
MDC_IDC_EPISODE_ID: 1834
MDC_IDC_EPISODE_ID: 1835
MDC_IDC_EPISODE_ID: 1836
MDC_IDC_EPISODE_ID: 1837
MDC_IDC_EPISODE_ID: 1838
MDC_IDC_EPISODE_ID: 1839
MDC_IDC_EPISODE_ID: 1840
MDC_IDC_EPISODE_ID: 1841
MDC_IDC_EPISODE_ID: 1842
MDC_IDC_EPISODE_ID: 1843
MDC_IDC_EPISODE_ID: 1844
MDC_IDC_EPISODE_ID: 1845
MDC_IDC_EPISODE_ID: 1846
MDC_IDC_EPISODE_ID: 1847
MDC_IDC_EPISODE_ID: 1848
MDC_IDC_EPISODE_ID: 1849
MDC_IDC_EPISODE_ID: 1850
MDC_IDC_EPISODE_ID: 1851
MDC_IDC_EPISODE_ID: 1852
MDC_IDC_EPISODE_ID: 1853
MDC_IDC_EPISODE_ID: 1854
MDC_IDC_EPISODE_ID: 1855
MDC_IDC_EPISODE_ID: 1856
MDC_IDC_EPISODE_ID: 1857
MDC_IDC_EPISODE_ID: 1858
MDC_IDC_EPISODE_ID: 1859
MDC_IDC_EPISODE_ID: 1860
MDC_IDC_EPISODE_ID: 1861
MDC_IDC_EPISODE_ID: 1862
MDC_IDC_EPISODE_ID: 1863
MDC_IDC_EPISODE_ID: 1864
MDC_IDC_EPISODE_ID: 1865
MDC_IDC_EPISODE_ID: 1866
MDC_IDC_EPISODE_ID: 1867
MDC_IDC_EPISODE_ID: 1868
MDC_IDC_EPISODE_ID: 1869
MDC_IDC_EPISODE_ID: 1870
MDC_IDC_EPISODE_ID: 1871
MDC_IDC_EPISODE_ID: 1872
MDC_IDC_EPISODE_ID: 1873
MDC_IDC_EPISODE_ID: 1874
MDC_IDC_EPISODE_ID: 1875
MDC_IDC_EPISODE_ID: 1876
MDC_IDC_EPISODE_ID: 1877
MDC_IDC_EPISODE_ID: 1878
MDC_IDC_EPISODE_ID: 1879
MDC_IDC_EPISODE_ID: 1880
MDC_IDC_EPISODE_ID: 1881
MDC_IDC_EPISODE_ID: 1882
MDC_IDC_EPISODE_TYPE: NORMAL
MDC_IDC_LEAD_IMPLANT_DT: NORMAL
MDC_IDC_LEAD_IMPLANT_DT: NORMAL
MDC_IDC_LEAD_LOCATION: NORMAL
MDC_IDC_LEAD_LOCATION: NORMAL
MDC_IDC_LEAD_LOCATION_DETAIL_1: NORMAL
MDC_IDC_LEAD_LOCATION_DETAIL_1: NORMAL
MDC_IDC_LEAD_MFG: NORMAL
MDC_IDC_LEAD_MFG: NORMAL
MDC_IDC_LEAD_MODEL: NORMAL
MDC_IDC_LEAD_MODEL: NORMAL
MDC_IDC_LEAD_POLARITY_TYPE: NORMAL
MDC_IDC_LEAD_POLARITY_TYPE: NORMAL
MDC_IDC_LEAD_SERIAL: NORMAL
MDC_IDC_LEAD_SERIAL: NORMAL
MDC_IDC_MSMT_BATTERY_DTM: NORMAL
MDC_IDC_MSMT_BATTERY_REMAINING_LONGEVITY: 12 MO
MDC_IDC_MSMT_BATTERY_RRT_TRIGGER: 2.83
MDC_IDC_MSMT_BATTERY_STATUS: NORMAL
MDC_IDC_MSMT_BATTERY_VOLTAGE: 2.88 V
MDC_IDC_MSMT_LEADCHNL_RA_IMPEDANCE_VALUE: 399 OHM
MDC_IDC_MSMT_LEADCHNL_RA_IMPEDANCE_VALUE: 418 OHM
MDC_IDC_MSMT_LEADCHNL_RA_PACING_THRESHOLD_AMPLITUDE: 0.62 V
MDC_IDC_MSMT_LEADCHNL_RA_PACING_THRESHOLD_PULSEWIDTH: 0.4 MS
MDC_IDC_MSMT_LEADCHNL_RA_SENSING_INTR_AMPL: 1.12 MV
MDC_IDC_MSMT_LEADCHNL_RV_IMPEDANCE_VALUE: 456 OHM
MDC_IDC_MSMT_LEADCHNL_RV_IMPEDANCE_VALUE: 513 OHM
MDC_IDC_MSMT_LEADCHNL_RV_PACING_THRESHOLD_AMPLITUDE: 0.88 V
MDC_IDC_MSMT_LEADCHNL_RV_PACING_THRESHOLD_PULSEWIDTH: 0.4 MS
MDC_IDC_MSMT_LEADCHNL_RV_SENSING_INTR_AMPL: 11.62 MV
MDC_IDC_PG_IMPLANT_DTM: NORMAL
MDC_IDC_PG_MFG: NORMAL
MDC_IDC_PG_MODEL: NORMAL
MDC_IDC_PG_SERIAL: NORMAL
MDC_IDC_PG_TYPE: NORMAL
MDC_IDC_SESS_CLINIC_NAME: NORMAL
MDC_IDC_SESS_DTM: NORMAL
MDC_IDC_SESS_TYPE: NORMAL
MDC_IDC_SET_BRADY_AT_MODE_SWITCH_RATE: 171 {BEATS}/MIN
MDC_IDC_SET_BRADY_HYSTRATE: NORMAL
MDC_IDC_SET_BRADY_LOWRATE: 60 {BEATS}/MIN
MDC_IDC_SET_BRADY_MAX_SENSOR_RATE: 120 {BEATS}/MIN
MDC_IDC_SET_BRADY_MAX_TRACKING_RATE: 120 {BEATS}/MIN
MDC_IDC_SET_BRADY_MODE: NORMAL
MDC_IDC_SET_BRADY_PAV_DELAY_LOW: 180 MS
MDC_IDC_SET_BRADY_SAV_DELAY_LOW: 150 MS
MDC_IDC_SET_LEADCHNL_RA_PACING_AMPLITUDE: 1.5 V
MDC_IDC_SET_LEADCHNL_RA_PACING_ANODE_ELECTRODE_1: NORMAL
MDC_IDC_SET_LEADCHNL_RA_PACING_ANODE_LOCATION_1: NORMAL
MDC_IDC_SET_LEADCHNL_RA_PACING_CAPTURE_MODE: NORMAL
MDC_IDC_SET_LEADCHNL_RA_PACING_CATHODE_ELECTRODE_1: NORMAL
MDC_IDC_SET_LEADCHNL_RA_PACING_CATHODE_LOCATION_1: NORMAL
MDC_IDC_SET_LEADCHNL_RA_PACING_POLARITY: NORMAL
MDC_IDC_SET_LEADCHNL_RA_PACING_PULSEWIDTH: 0.4 MS
MDC_IDC_SET_LEADCHNL_RA_SENSING_ANODE_ELECTRODE_1: NORMAL
MDC_IDC_SET_LEADCHNL_RA_SENSING_ANODE_LOCATION_1: NORMAL
MDC_IDC_SET_LEADCHNL_RA_SENSING_CATHODE_ELECTRODE_1: NORMAL
MDC_IDC_SET_LEADCHNL_RA_SENSING_CATHODE_LOCATION_1: NORMAL
MDC_IDC_SET_LEADCHNL_RA_SENSING_POLARITY: NORMAL
MDC_IDC_SET_LEADCHNL_RA_SENSING_SENSITIVITY: 0.3 MV
MDC_IDC_SET_LEADCHNL_RV_PACING_AMPLITUDE: 2 V
MDC_IDC_SET_LEADCHNL_RV_PACING_ANODE_ELECTRODE_1: NORMAL
MDC_IDC_SET_LEADCHNL_RV_PACING_ANODE_LOCATION_1: NORMAL
MDC_IDC_SET_LEADCHNL_RV_PACING_CAPTURE_MODE: NORMAL
MDC_IDC_SET_LEADCHNL_RV_PACING_CATHODE_ELECTRODE_1: NORMAL
MDC_IDC_SET_LEADCHNL_RV_PACING_CATHODE_LOCATION_1: NORMAL
MDC_IDC_SET_LEADCHNL_RV_PACING_POLARITY: NORMAL
MDC_IDC_SET_LEADCHNL_RV_PACING_PULSEWIDTH: 0.4 MS
MDC_IDC_SET_LEADCHNL_RV_SENSING_ANODE_ELECTRODE_1: NORMAL
MDC_IDC_SET_LEADCHNL_RV_SENSING_ANODE_LOCATION_1: NORMAL
MDC_IDC_SET_LEADCHNL_RV_SENSING_CATHODE_ELECTRODE_1: NORMAL
MDC_IDC_SET_LEADCHNL_RV_SENSING_CATHODE_LOCATION_1: NORMAL
MDC_IDC_SET_LEADCHNL_RV_SENSING_POLARITY: NORMAL
MDC_IDC_SET_LEADCHNL_RV_SENSING_SENSITIVITY: 0.9 MV
MDC_IDC_SET_ZONE_DETECTION_INTERVAL: 200 MS
MDC_IDC_SET_ZONE_DETECTION_INTERVAL: 350 MS
MDC_IDC_SET_ZONE_DETECTION_INTERVAL: 400 MS
MDC_IDC_SET_ZONE_TYPE: NORMAL
MDC_IDC_STAT_AT_BURDEN_PERCENT: 3.9 %
MDC_IDC_STAT_AT_DTM_END: NORMAL
MDC_IDC_STAT_AT_DTM_START: NORMAL
MDC_IDC_STAT_BRADY_AP_VP_PERCENT: 32 %
MDC_IDC_STAT_BRADY_AP_VS_PERCENT: 0 %
MDC_IDC_STAT_BRADY_AS_VP_PERCENT: 67.91 %
MDC_IDC_STAT_BRADY_AS_VS_PERCENT: 0.09 %
MDC_IDC_STAT_BRADY_DTM_END: NORMAL
MDC_IDC_STAT_BRADY_DTM_START: NORMAL
MDC_IDC_STAT_BRADY_RA_PERCENT_PACED: 31.43 %
MDC_IDC_STAT_BRADY_RV_PERCENT_PACED: 99.88 %
MDC_IDC_STAT_EPISODE_RECENT_COUNT: 0
MDC_IDC_STAT_EPISODE_RECENT_COUNT: 291
MDC_IDC_STAT_EPISODE_RECENT_COUNT_DTM_END: NORMAL
MDC_IDC_STAT_EPISODE_RECENT_COUNT_DTM_START: NORMAL
MDC_IDC_STAT_EPISODE_TOTAL_COUNT: 0
MDC_IDC_STAT_EPISODE_TOTAL_COUNT: 0
MDC_IDC_STAT_EPISODE_TOTAL_COUNT: 1
MDC_IDC_STAT_EPISODE_TOTAL_COUNT: 3038
MDC_IDC_STAT_EPISODE_TOTAL_COUNT_DTM_END: NORMAL
MDC_IDC_STAT_EPISODE_TOTAL_COUNT_DTM_START: NORMAL
MDC_IDC_STAT_EPISODE_TYPE: NORMAL

## 2023-02-21 ENCOUNTER — ANCILLARY PROCEDURE (OUTPATIENT)
Dept: CARDIOLOGY | Facility: CLINIC | Age: 88
End: 2023-02-21
Attending: INTERNAL MEDICINE
Payer: MEDICARE

## 2023-02-21 DIAGNOSIS — I44.30 AV BLOCK: ICD-10-CM

## 2023-02-21 PROCEDURE — 93296 REM INTERROG EVL PM/IDS: CPT

## 2023-02-21 PROCEDURE — 99207 CARDIAC DEVICE CHECK - REMOTE: CPT | Performed by: INTERNAL MEDICINE

## 2023-02-22 LAB
MDC_IDC_EPISODE_DTM: NORMAL
MDC_IDC_EPISODE_DURATION: 101 S
MDC_IDC_EPISODE_DURATION: 138 S
MDC_IDC_EPISODE_DURATION: 1844 S
MDC_IDC_EPISODE_DURATION: 220 S
MDC_IDC_EPISODE_DURATION: 2288 S
MDC_IDC_EPISODE_DURATION: 3922 S
MDC_IDC_EPISODE_DURATION: 49 S
MDC_IDC_EPISODE_DURATION: 624 S
MDC_IDC_EPISODE_DURATION: 837 S
MDC_IDC_EPISODE_ID: 1910
MDC_IDC_EPISODE_ID: 1911
MDC_IDC_EPISODE_ID: 1912
MDC_IDC_EPISODE_ID: 1913
MDC_IDC_EPISODE_ID: 1914
MDC_IDC_EPISODE_ID: 1915
MDC_IDC_EPISODE_ID: 1916
MDC_IDC_EPISODE_ID: 1917
MDC_IDC_EPISODE_ID: 1918
MDC_IDC_EPISODE_TYPE: NORMAL
MDC_IDC_LEAD_IMPLANT_DT: NORMAL
MDC_IDC_LEAD_IMPLANT_DT: NORMAL
MDC_IDC_LEAD_LOCATION: NORMAL
MDC_IDC_LEAD_LOCATION: NORMAL
MDC_IDC_LEAD_LOCATION_DETAIL_1: NORMAL
MDC_IDC_LEAD_LOCATION_DETAIL_1: NORMAL
MDC_IDC_LEAD_MFG: NORMAL
MDC_IDC_LEAD_MFG: NORMAL
MDC_IDC_LEAD_MODEL: NORMAL
MDC_IDC_LEAD_MODEL: NORMAL
MDC_IDC_LEAD_POLARITY_TYPE: NORMAL
MDC_IDC_LEAD_POLARITY_TYPE: NORMAL
MDC_IDC_LEAD_SERIAL: NORMAL
MDC_IDC_LEAD_SERIAL: NORMAL
MDC_IDC_MSMT_BATTERY_DTM: NORMAL
MDC_IDC_MSMT_BATTERY_REMAINING_LONGEVITY: 8 MO
MDC_IDC_MSMT_BATTERY_RRT_TRIGGER: 2.83
MDC_IDC_MSMT_BATTERY_STATUS: NORMAL
MDC_IDC_MSMT_BATTERY_VOLTAGE: 2.87 V
MDC_IDC_MSMT_LEADCHNL_RA_IMPEDANCE_VALUE: 399 OHM
MDC_IDC_MSMT_LEADCHNL_RA_IMPEDANCE_VALUE: 437 OHM
MDC_IDC_MSMT_LEADCHNL_RA_PACING_THRESHOLD_AMPLITUDE: 0.5 V
MDC_IDC_MSMT_LEADCHNL_RA_PACING_THRESHOLD_PULSEWIDTH: 0.4 MS
MDC_IDC_MSMT_LEADCHNL_RA_SENSING_INTR_AMPL: 1.3 MV
MDC_IDC_MSMT_LEADCHNL_RV_IMPEDANCE_VALUE: 418 OHM
MDC_IDC_MSMT_LEADCHNL_RV_IMPEDANCE_VALUE: 475 OHM
MDC_IDC_MSMT_LEADCHNL_RV_PACING_THRESHOLD_AMPLITUDE: 0.75 V
MDC_IDC_MSMT_LEADCHNL_RV_PACING_THRESHOLD_PULSEWIDTH: 0.4 MS
MDC_IDC_MSMT_LEADCHNL_RV_SENSING_INTR_AMPL: 6.8 MV
MDC_IDC_PG_IMPLANT_DTM: NORMAL
MDC_IDC_PG_MFG: NORMAL
MDC_IDC_PG_MODEL: NORMAL
MDC_IDC_PG_SERIAL: NORMAL
MDC_IDC_PG_TYPE: NORMAL
MDC_IDC_SESS_CLINIC_NAME: NORMAL
MDC_IDC_SESS_DTM: NORMAL
MDC_IDC_SESS_TYPE: NORMAL
MDC_IDC_SET_BRADY_AT_MODE_SWITCH_RATE: 171 {BEATS}/MIN
MDC_IDC_SET_BRADY_HYSTRATE: NORMAL
MDC_IDC_SET_BRADY_LOWRATE: 60 {BEATS}/MIN
MDC_IDC_SET_BRADY_MAX_SENSOR_RATE: 120 {BEATS}/MIN
MDC_IDC_SET_BRADY_MAX_TRACKING_RATE: 120 {BEATS}/MIN
MDC_IDC_SET_BRADY_MODE: NORMAL
MDC_IDC_SET_BRADY_PAV_DELAY_LOW: 180 MS
MDC_IDC_SET_BRADY_SAV_DELAY_LOW: 150 MS
MDC_IDC_SET_LEADCHNL_RA_PACING_AMPLITUDE: 1.5 V
MDC_IDC_SET_LEADCHNL_RA_PACING_ANODE_ELECTRODE_1: NORMAL
MDC_IDC_SET_LEADCHNL_RA_PACING_ANODE_LOCATION_1: NORMAL
MDC_IDC_SET_LEADCHNL_RA_PACING_CAPTURE_MODE: NORMAL
MDC_IDC_SET_LEADCHNL_RA_PACING_CATHODE_ELECTRODE_1: NORMAL
MDC_IDC_SET_LEADCHNL_RA_PACING_CATHODE_LOCATION_1: NORMAL
MDC_IDC_SET_LEADCHNL_RA_PACING_POLARITY: NORMAL
MDC_IDC_SET_LEADCHNL_RA_PACING_PULSEWIDTH: 0.4 MS
MDC_IDC_SET_LEADCHNL_RA_SENSING_ANODE_ELECTRODE_1: NORMAL
MDC_IDC_SET_LEADCHNL_RA_SENSING_ANODE_LOCATION_1: NORMAL
MDC_IDC_SET_LEADCHNL_RA_SENSING_CATHODE_ELECTRODE_1: NORMAL
MDC_IDC_SET_LEADCHNL_RA_SENSING_CATHODE_LOCATION_1: NORMAL
MDC_IDC_SET_LEADCHNL_RA_SENSING_POLARITY: NORMAL
MDC_IDC_SET_LEADCHNL_RA_SENSING_SENSITIVITY: 0.3 MV
MDC_IDC_SET_LEADCHNL_RV_PACING_AMPLITUDE: 2 V
MDC_IDC_SET_LEADCHNL_RV_PACING_ANODE_ELECTRODE_1: NORMAL
MDC_IDC_SET_LEADCHNL_RV_PACING_ANODE_LOCATION_1: NORMAL
MDC_IDC_SET_LEADCHNL_RV_PACING_CAPTURE_MODE: NORMAL
MDC_IDC_SET_LEADCHNL_RV_PACING_CATHODE_ELECTRODE_1: NORMAL
MDC_IDC_SET_LEADCHNL_RV_PACING_CATHODE_LOCATION_1: NORMAL
MDC_IDC_SET_LEADCHNL_RV_PACING_POLARITY: NORMAL
MDC_IDC_SET_LEADCHNL_RV_PACING_PULSEWIDTH: 0.4 MS
MDC_IDC_SET_LEADCHNL_RV_SENSING_ANODE_ELECTRODE_1: NORMAL
MDC_IDC_SET_LEADCHNL_RV_SENSING_ANODE_LOCATION_1: NORMAL
MDC_IDC_SET_LEADCHNL_RV_SENSING_CATHODE_ELECTRODE_1: NORMAL
MDC_IDC_SET_LEADCHNL_RV_SENSING_CATHODE_LOCATION_1: NORMAL
MDC_IDC_SET_LEADCHNL_RV_SENSING_POLARITY: NORMAL
MDC_IDC_SET_LEADCHNL_RV_SENSING_SENSITIVITY: 0.9 MV
MDC_IDC_SET_ZONE_DETECTION_INTERVAL: 200 MS
MDC_IDC_SET_ZONE_DETECTION_INTERVAL: 350 MS
MDC_IDC_SET_ZONE_DETECTION_INTERVAL: 400 MS
MDC_IDC_SET_ZONE_TYPE: NORMAL
MDC_IDC_STAT_AT_BURDEN_PERCENT: 0.2 %
MDC_IDC_STAT_AT_DTM_END: NORMAL
MDC_IDC_STAT_AT_DTM_START: NORMAL
MDC_IDC_STAT_BRADY_AP_VP_PERCENT: 33.93 %
MDC_IDC_STAT_BRADY_AP_VS_PERCENT: 0 %
MDC_IDC_STAT_BRADY_AS_VP_PERCENT: 65.96 %
MDC_IDC_STAT_BRADY_AS_VS_PERCENT: 0.11 %
MDC_IDC_STAT_BRADY_DTM_END: NORMAL
MDC_IDC_STAT_BRADY_DTM_START: NORMAL
MDC_IDC_STAT_BRADY_RA_PERCENT_PACED: 33.87 %
MDC_IDC_STAT_BRADY_RV_PERCENT_PACED: 99.86 %
MDC_IDC_STAT_EPISODE_RECENT_COUNT: 0
MDC_IDC_STAT_EPISODE_RECENT_COUNT: 9
MDC_IDC_STAT_EPISODE_RECENT_COUNT_DTM_END: NORMAL
MDC_IDC_STAT_EPISODE_RECENT_COUNT_DTM_START: NORMAL
MDC_IDC_STAT_EPISODE_TOTAL_COUNT: 0
MDC_IDC_STAT_EPISODE_TOTAL_COUNT: 0
MDC_IDC_STAT_EPISODE_TOTAL_COUNT: 1
MDC_IDC_STAT_EPISODE_TOTAL_COUNT: 3100
MDC_IDC_STAT_EPISODE_TOTAL_COUNT_DTM_END: NORMAL
MDC_IDC_STAT_EPISODE_TOTAL_COUNT_DTM_START: NORMAL
MDC_IDC_STAT_EPISODE_TYPE: NORMAL

## 2023-09-20 NOTE — PROGRESS NOTES
"Electrophysiology Clinic Video Virtual Visit    Michaela Soria is a 89 year old female who is being evaluated via a billable video visit.      The patient has been notified of following:     \"This video visit will be conducted via a call between you and your physician/provider. We have found that certain health care needs can be provided without the need for an in-person physical exam.  This service lets us provide the care you need with a video conversation.  If a prescription is necessary we can send it directly to your pharmacy.  If lab work is needed we can place an order for that and you can then stop by our lab to have the test done at a later time.    If during the course of the call the physician/provider feels a video visit is not appropriate, you will not be charged for this service.\"     Physician has received verbal consent for a Video Visit from the patient? Yes    Patient would like the video invitation sent by: Send to e-mail at: [email or web address]    Video Start Time:  10 am    Video Stop Time: 10:20 am    Mode of Communication:  Video Conference via [employer]    Originating Location (pt. Location): Other Daughter    Distant Location (provider location): [facility]    Mode of Communication:  Video Conference via [employer]      HPI:   89 h/o CHB s/p dual chamber pacemaker 3/17/2014.  Her generator is nearing ANGELLA. She has difficulty with transportation so I am meeting her and family today virtually to discuss generator change procedure.    The video visit was arranged with her daughter and the nursing home - however we could not get a hold of nursing home staff.     I spoke with her daughter who is close to her mom and last saw her a week ago. She says her mom looks really good for a 89 yo. She is in a wheelchair to avoid falls. She has no problems with lying flat. She is mentally intact. She is however deaf, and communicates via an ARTEMIO which translates voice to text. She currently " has COVID as it is going through her nursing home.      PAST MEDICAL HISTORY:  CHB s/p dual chamber pacemaker 3/17/2014  Paroxymal AF noted on pacemaker checks, declined anticoagulation  Diabetes type II  Breast cancer bilateral mastectomy  H/o mental illness mainly anxiety, although schizophrenia is listed       CURRENT MEDICATIONS:  Atorvastatin 20 every day  Lasix 40 every day  Lisinopril 20 qd  Glipizide 7.5 bid  Insulin  Trazodone prn  Hydroxyzine prn      ALLERGIES:     Allergies   Allergen Reactions    Strawberry Extract Hives    Sulfa Antibiotics Nausea     mouth sores      Zomig [Zolmitriptan] Other (See Comments)     depression         FAMILY HISTORY:  Family History   Problem Relation Age of Onset    Neurologic Disorder Mother     Sudden Death Mother     Cancer Father         lung cancer,  68    Musculoskeletal Disorder Father         deaf    Cerebrovascular Disease Paternal Grandfather         3 strokes       SOCIAL HISTORY:  Social History     Tobacco Use    Smoking status: Former     Packs/day: 0.20     Years: 10.00     Pack years: 2.00     Types: Cigarettes     Quit date: 1980     Years since quittin.7    Smokeless tobacco: Never   Substance Use Topics    Alcohol use: No    Drug use: No       ROS:  10 point ROS neg other than the symptoms noted above in the HPI.    I have personally reviewed the following:    DEVICE INTERROGATION remote 2023:   Medtronic Cairn KNUTSON; AAIR+  bpm  AP 37%,  100%  RRT < 2 months    Assessment and Plan:   Unfortunately could not see patient virtually as we could not get a hold of nursing home staff.  I discussed generator procedure with daughter. Generator is not yet at [facility].   Her daughter is planning on another visit in a few weeks and will relay my information to patient.  We agreed on a plan that when generator reaches [facility], we will try to set up another visit with daughter and nursing home/patient - nursing home staff will be bringing patient  in for procedure so it is important that they participate in the instructions.    In addition to video time documented above, I spent an additional 20 minutes today on data review and documentation.      I have reviewed the note as documented above.  This accurately captures the substance of my virtual visit with the patient. The patient states understanding and is agreeable with the plan.     Jacquelyn Bynum MD  Cardiology

## 2023-09-21 NOTE — LETTER
"9/21/2023      RE: Michaela Soria  [address] [address] [facility] [address]       Dear Colleague,    Thank you for the opportunity to participate in the care of your patient, Michaela Soria, at the [facility] [place] [facility]. Please see a copy of my visit note below.    Electrophysiology Clinic Video Virtual Visit    Michaela Soria is a 89 year old female who is being evaluated via a billable video visit.      The patient has been notified of following:     \"This video visit will be conducted via a call between you and your physician/provider. We have found that certain health care needs can be provided without the need for an in-person physical exam.  This service lets us provide the care you need with a video conversation.  If a prescription is necessary we can send it directly to your pharmacy.  If lab work is needed we can place an order for that and you can then stop by our lab to have the test done at a later time.    If during the course of the call the physician/provider feels a video visit is not appropriate, you will not be charged for this service.\"     Physician has received verbal consent for a Video Visit from the patient? Yes    Patient would like the video invitation sent by: Send to e-mail at: [email or web address]    Video Start Time:  10 am    Video Stop Time: 10:20 am    Mode of Communication:  Video Conference via [employer]    Originating Location (pt. Location): Other Daughter    Distant Location (provider location): [facility]    Mode of Communication:  Video Conference via [employer]      HPI:   89 h/o CHB s/p dual chamber pacemaker 3/17/2014.  Her generator is nearing ANGELLA. She has difficulty with transportation so I am meeting her and family today virtually to discuss generator change procedure.    The video visit was arranged with her daughter and the nursing home - however we could not get a hold of " nursing home staff.     I spoke with her daughter who is close to her mom and last saw her a week ago. She says her mom looks really good for a 89 yo. She is in a wheelchair to avoid falls. She has no problems with lying flat. She is mentally intact. She is however deaf, and communicates via an ARTEMIO which translates voice to text. She currently has COVID as it is going through her nursing home.      PAST MEDICAL HISTORY:  CHB s/p dual chamber pacemaker 3/17/2014  Paroxymal AF noted on pacemaker checks, declined anticoagulation  Diabetes type II  Breast cancer bilateral mastectomy  H/o mental illness mainly anxiety, although schizophrenia is listed       CURRENT MEDICATIONS:  Atorvastatin 20 every day  Lasix 40 every day  Lisinopril 20 qd  Glipizide 7.5 bid  Insulin  Trazodone prn  Hydroxyzine prn      ALLERGIES:     Allergies   Allergen Reactions     Strawberry Extract Hives     Sulfa Antibiotics Nausea     mouth sores       Zomig [Zolmitriptan] Other (See Comments)     depression         FAMILY HISTORY:  Family History   Problem Relation Age of Onset     Neurologic Disorder Mother      Sudden Death Mother      Cancer Father         lung cancer,  68     Musculoskeletal Disorder Father         deaf     Cerebrovascular Disease Paternal Grandfather         3 strokes       SOCIAL HISTORY:  Social History     Tobacco Use     Smoking status: Former     Packs/day: 0.20     Years: 10.00     Pack years: 2.00     Types: Cigarettes     Quit date: 1980     Years since quittin.7     Smokeless tobacco: Never   Substance Use Topics     Alcohol use: No     Drug use: No       ROS:  10 point ROS neg other than the symptoms noted above in the HPI.    I have personally reviewed the following:    DEVICE INTERROGATION remote 2023:   Medtronic Carin KNUTSON; AAIR+  bpm  AP 37%,  100%  RRT < 2 months    Assessment and Plan:   Unfortunately could not see patient virtually as we could not get a hold of nursing home  staff.  I discussed generator procedure with daughter. Generator is not yet at [facility].   Her daughter is planning on another visit in a few weeks and will relay my information to patient.  We agreed on a plan that when generator reaches [facility], we will try to set up another visit with daughter and nursing home/patient - nursing home staff will be bringing patient in for procedure so it is important that they participate in the instructions.    In addition to video time documented above, I spent an additional 20 minutes today on data review and documentation.      I have reviewed the note as documented above.  This accurately captures the substance of my virtual visit with the patient. The patient states understanding and is agreeable with the plan.     Jacquelyn Bynum MD  Cardiology            Virtual Visit Details  See below      Please do not hesitate to contact me if you have any questions/concerns.     Sincerely,     Jacquelyn Bynum MD

## 2023-09-21 NOTE — NURSING NOTE
Is the patient currently in the state of [place]? YES    Visit mode:VIDEO    If the visit is dropped, the patient can be reconnected by: VIDEO VISIT: Send to e-mail at: [email or web address]    Will anyone else be joining the visit? NO  (If patient encounters technical issues they should call [phone])    How would you like to obtain your AVS? MyChart    Are changes needed to the allergy or medication list? No    Reason for visit: Consult    Susan CONLKIN

## 2023-10-19 NOTE — PATIENT INSTRUCTIONS
You were seen in the Electrophysiology Clinic today by: Dr Jacquelyn Bynum    Plan:     Further Instructions:   Pacemaker Generator Change    If you have further questions, please utilize [employer] to contact us.     Your Care Team:    [facility] Cardiology   Telephone Number     Nurse Line  Blanca Campbell, RN   Ariadna De Leon, SIRENA [place]stead RN   [phone]     For scheduling appointments:   Cesar   [phone]   For procedure scheduling:    Julisa Hand     [phone]   For the Device Clinic (Pacemakers, ICDs, Loop Recorders)    During business hours: [phone]  After business hours:   [phone]- select option 4 and ask for job code [id number].       On-call cardiologist for after hours or on weekends:   [phone], option #4, and ask to speak to the on-call cardiologist.     Cardiovascular Clinic:   [address] [place] [address]. [place] [address]      As always, Thank you for trusting us with your health care needs!

## 2023-10-19 NOTE — PROGRESS NOTES
"Electrophysiology Clinic Video Virtual Visit    Michaela Soria is a 89 year old female who is being evaluated via a billable video visit.      The patient has been notified of following:     \"This video visit will be conducted via a call between you and your physician/provider. We have found that certain health care needs can be provided without the need for an in-person physical exam.  This service lets us provide the care you need with a video conversation.  If a prescription is necessary we can send it directly to your pharmacy.  If lab work is needed we can place an order for that and you can then stop by our lab to have the test done at a later time.    If during the course of the call the physician/provider feels a video visit is not appropriate, you will not be charged for this service.\"     Physician has received verbal consent for a Video Visit from the patient? Yes    Video Start Time: 9:29 AM    Video Stop Time: 09:55 am    Mode of Communication:  Video Conference via Artisan Mobile    Originating Location (pt. Location): Home    Distant Location (provider location): [facility]    Mode of Communication:  Video Conference via [employer] Mobile      HPI:   89 yo with complete heart block s/p dual chamber pacemaker 3/17/2014.  Her generator is nearing ANGELLA. She has difficulty with transportation so I am meeting her and family today virtually to discuss generator change procedure.    I had a virtual vsit scheduled with her and her nursing home 9/21/2023, however we were unable to reach the nursing home at that time but I had a chance to speak with her daughter. At that time, Ms. Soria had COVID as it was going through her nursing home. Her daughter also informed me that although Ms. Soria is in a wheelchair usually to avoid fall, she has no issues with lying flat, is mentally alert, is deaf and communicates via an ARTEMIO which translates voice to text.    Today, daughter Tessa, patient, and nursing home caregiver " Pam are on video visit. Tessa says she has had prolonged conversations with patient just yesterday and patient gave consent to proceed. This is the first time I've seen Michaela on video - she is sitting in a wheelchair, slumped over dozing off, but Pam woke her up easily. Michaela appears to be very irritated. Pam says she's tired and not in a good mood today. Pam is translating by typing our conversation for Michaela to read. When I asked Michaela if she is ok for the procedure, she kept shaking her head. However she also expressed to her daughter yesterday that she did not want to die.      PAST MEDICAL HISTORY:  CHB s/p dual chamber pacemaker 3/17/2014  Paroxymal AF noted on pacemaker checks, declined anticoagulation  Diabetes type II  Breast cancer bilateral mastectomy  H/o mental illness mainly anxiety, although schizophrenia is liste      CURRENT MEDICATIONS:  Atorvastatin 20 every day  Lasix 40 every day  Lisinopril 20 qd  Glipizide 7.5 bid  Insulin  Trazodone prn  Hydroxyzine prn      ALLERGIES:     Allergies   Allergen Reactions    Strawberry Extract Hives    Sulfa Antibiotics Nausea     mouth sores      Zomig [Zolmitriptan] Other (See Comments)     depression         FAMILY HISTORY:  Family History   Problem Relation Age of Onset    Neurologic Disorder Mother     Sudden Death Mother     Cancer Father         lung cancer,  68    Musculoskeletal Disorder Father         deaf    Cerebrovascular Disease Paternal Grandfather         3 strokes       SOCIAL HISTORY:  Social History     Tobacco Use    Smoking status: Former     Packs/day: 0.20     Years: 10.00     Additional pack years: 0.00     Total pack years: 2.00     Types: Cigarettes     Quit date: 1980     Years since quittin.8    Smokeless tobacco: Never   Substance Use Topics    Alcohol use: No    Drug use: No       ROS:  10 point ROS neg other than the symptoms noted above in the HPI.    I have personally reviewed the following:    DEVICE  INTERROGATION remote 10/17/2023:   Medtronic Carin KNUTSON; AAIR+  bpm  AP 37%,  100%  RRT reached 9/28/2023    Exam:  The rest of a comprehensive physical examination is deferred due to public health emergency video visit restrictions.  CONSITUTIONAL: elderly, sitting in wheelchair, initially slumped over sleeping, appears agitated when awoken.  RESPIRATORY: respirations nonlabored, no cough  PSYCH: agitated    Assessment and Plan:   Pacemaker generator at [facility] as of 9/28/2023.  She is pacemaker dependent.  Discussed details of procedure with caregiver Buzz Zarate her daughter will plan to be at hospital with her to help with consent.  Patient uses an xena on smart phone to communicate.    Will schedule for October 30, 2023; will plan a video visit with device nurses for her 1 week follow up.    Contacts:  Tessa Cha Daughter [phone] (cell)  Pma (Nursing home caregiver) [phone] (cell)      In addition to video time documented above, I spent an additional 20 minutes today on data review and documentation.    I have reviewed the note as documented above.  This accurately captures the substance of my virtual visit with the patient. The patient states understanding and is agreeable with the plan.     Jacquelyn Bynum MD  Cardiology

## 2023-10-19 NOTE — LETTER
10/19/2023      RE: Michaela Soria  [address] [id number]       Dear Colleague,    Thank you for the opportunity to participate in the care of your patient, Michaela Soria, at the [facility] [place] [facility]. Please see a copy of my visit note below.    Electrophysiology Clinic Video Virtual Visit      HPI:   89 yo with complete heart block s/p dual chamber pacemaker 3/17/2014.  Her generator is nearing ANEGLLA. She has difficulty with transportation so I am meeting her and family today virtually to discuss generator change procedure.    I had a virtual vsit scheduled with her and her nursing home 9/21/2023, however we were unable to reach the nursing home at that time but I had a chance to speak with her daughter. At that time, Ms. Soria had COVID as it was going through her nursing home. Her daughter also informed me that although Ms. Soria is in a wheelchair usually to avoid fall, she has no issues with lying flat, is mentally alert, is deaf and communicates via an ARTEMIO which translates voice to text.    Today, daughter Tessa, patient, and nursing home caregiver Pam are on video visit. Tessa says she has had prolonged conversations with patient just yesterday and patient gave consent to proceed. This is the first time I've seen Michaela on video - she is sitting in a wheelchair, slumped over dozing off, but Pam woke her up easily. Michaela appears to be very irritated. Pam says she's tired and not in a good mood today. Pam is translating by typing our conversation for Michaela to read. When I asked Michaela if she is ok for the procedure, she kept shaking her head. However she also expressed to her daughter yesterday that she did not want to die.      PAST MEDICAL HISTORY:  CHB s/p dual chamber pacemaker 3/17/2014  Paroxymal AF noted on pacemaker checks, declined anticoagulation  Diabetes type II  Breast cancer bilateral  mastectomy  H/o mental illness mainly anxiety, although schizophrenia is liste      CURRENT MEDICATIONS:  Atorvastatin 20 every day  Lasix 40 every day  Lisinopril 20 qd  Glipizide 7.5 bid  Insulin  Trazodone prn  Hydroxyzine prn      ALLERGIES:     Allergies   Allergen Reactions    Strawberry Extract Hives    Sulfa Antibiotics Nausea     mouth sores      Zomig [Zolmitriptan] Other (See Comments)     depression         FAMILY HISTORY:  Family History   Problem Relation Age of Onset    Neurologic Disorder Mother     Sudden Death Mother     Cancer Father         lung cancer,  68    Musculoskeletal Disorder Father         deaf    Cerebrovascular Disease Paternal Grandfather         3 strokes       SOCIAL HISTORY:  Social History     Tobacco Use    Smoking status: Former     Packs/day: 0.20     Years: 10.00     Additional pack years: 0.00     Total pack years: 2.00     Types: Cigarettes     Quit date: 1980     Years since quittin.8    Smokeless tobacco: Never   Substance Use Topics    Alcohol use: No    Drug use: No       ROS:  10 point ROS neg other than the symptoms noted above in the HPI.    I have personally reviewed the following:    DEVICE INTERROGATION remote 10/17/2023:   Medtronic Carin KNUTSON; AAIR+  bpm  AP 37%,  100%  RRT reached 2023    Exam:  The rest of a comprehensive physical examination is deferred due to public health emergency video visit restrictions.  CONSITUTIONAL: elderly, sitting in wheelchair, initially slumped over sleeping, appears agitated when awoken.  RESPIRATORY: respirations nonlabored, no cough  PSYCH: agitated    Assessment and Plan:   Pacemaker generator at [facility] as of 2023.  She is pacemaker dependent.  Discussed details of procedure with caregiver Buzz   Tessa her daughter will plan to be at hospital with her to help with consent.  Patient uses an xena on smart phone to communicate.    Will schedule for 2023; will plan a video visit with  device nurses for her 1 week follow up.    Contacts:  Tessa Cha Daughter [phone] (cell)  Pam (Nursing home caregiver) [phone] (cell)      In addition to video time documented above, I spent an additional 20 minutes today on data review and documentation.    I have reviewed the note as documented above.  This accurately captures the substance of my virtual visit with the patient. The patient states understanding and is agreeable with the plan.     Jacquelyn Bynum MD  Cardiology                  Please do not hesitate to contact me if you have any questions/concerns.     Sincerely,     Jacquelyn Bynum MD

## 2023-10-19 NOTE — NURSING NOTE
Is the patient currently in the state of [place]? YES    Visit mode:VIDEO    If the visit is dropped, the patient can be reconnected by: VIDEO VISIT: Send to e-mail at: [email or web address]    Will anyone else be joining the visit? NO  (If patient encounters technical issues they should call [phone]    How would you like to obtain your AVS? MyChart    Are changes needed to the allergy or medication list? Yes many changes     Reason for visit: TOPHER CONKLIN

## 2023-10-23 NOTE — TELEPHONE ENCOUNTER
"Attempting to call \"Pam\" from the care facility a 2nd time and had to leave another message. When speaking to the daughter, she states that she's had conversation with Pam last week and told her that the U was trying to reach her to which Pam replied, \"so are 20 other people\".     Messages were left again today on Pam's cell phone and her office voice mail.     Will attempt to mail the letter and wipes for the prep for the patient's generator change.     Julisa Hand  Periop Electrophysiology   [phone]    "

## 2023-10-25 NOTE — TELEPHONE ENCOUNTER
[facility] Call Center    Phone Message    May a detailed message be left on voicemail: yes     Reason for Call: Other: camelia from assisted living is returning a call, camelia stated she has no further questions its a pretty basic procedure and if there is any orders to just fax it to her like previously discussed, please advise, thank you.     Action Taken: Other: cardiology     Travel Screening: Not Applicable  Thank you!  [facility]

## 2023-10-25 NOTE — TELEPHONE ENCOUNTER
Called Pam at [facility] facility to review upcoming procedure instructions, left voicemail for a return call.     Blanca Campbell RN on 10/25/2023 at 10:58 AM

## 2023-10-30 NOTE — PROGRESS NOTES
Pt arrived to 2A from assistant living ( [facility] [place] for generator change. VSS. Denies pain. Consent obtained . Lab resulted. H&P current. Allergies reviewed with pt. Appropriately NPO.  Prep completed. Daughter and care taker (Olvin) at bedside; will be transporting patient to home. Pt is legally deaf, Used [employer] service for interpreting.

## 2023-10-30 NOTE — Clinical Note
dry, intact, no bleeding and no hematoma. L chest site secured with sutures, steri strips and primapore.

## 2023-10-30 NOTE — Clinical Note
The ECG shows a paced rhythm. Pacer turned on. The patient has permanent pacemaker. ECG rate  = 59 bpm.

## 2023-10-30 NOTE — PROGRESS NOTES
PIV removed. Discharge paperwork reviewed with patient and caretaker; pt stated understanding.. Discharge paperwork sent to [facility]). Pt will  prescription from discharge pharmacy.  Rustam (caretaker )  will transport patient home.

## 2023-10-30 NOTE — PROGRESS NOTES
Patient arrived to room via litter with Cath lab RN s/p Generator change . VSS. Denies pain. Pt alert and oriented x4. Left chest site CDI; site covered with perimapore

## 2023-10-30 NOTE — DISCHARGE INSTRUCTIONS
Home Care after a Pacemaker Battery Change    Wound care:  Keep your incision (surgery wound) dry for 3 days.  After 3 days, you may remove the outer bandage.  Keep the strips of tape on.  They will be removed at your clinic visit.  Check for signs of infection each day.  These include increased redness, swelling, drainage or a fever over 101 F (38.3 C).  Call us immediately if you see any of these signs.  If there are no signs of infection, you may shower in 3 days.  Do not submerge the incision (in a bath tub, hot tub, or swimming pool) until fully healed.    Pain:   You may have mild to moderate pain for 3 to 5 days.  Take acetaminophen (Tylenol) or ibuprofen (Advil) for the pain.  Call us if the pain is severe or lasts more than 5 days.    Activity:  After 24 hours, slowly return to your normal activities.  Healing will take 4 to 6 weeks.  Do not drive for 24 hours.  For 3 days, do not raise your affected arm above your shoulder.  For 10 days, do not use your affected arm to push, pull or lift anything over 10 pounds.  Avoid anything that may cause rough contact or a hard hit to your chest.  This includes football, hockey, and other contact sports.    Follow Up Visits:  Return to the clinic in 7 to 10 days to have your device and wound checked.      Telling others about your device:  Before you have any medical tests or treatments, tell the doctors, dentists, and other care providers about your device.  There are a few tests and treatments that may interfere with your device.  (These include MRI, radiation therapy, electrocautery, and others.)  Your care team may need to take special steps to keep you safe.  Before you leave the hospital, you will receive a temporary ID card.  A permanent card will be mailed to you about 6 to 8 weeks later.  Always carry the ID card with you.  It has important details about your device.  You should also get a MedicAlert ID.  Please ask us for a MedicAlert brochure, or go to  [email or web address].    Safety near electrical equipment:  All of these are safe to use when in good repair -  Microwaves  Radios  Cordless phone  Remote controls  Small electrical tools  Cell phones: Keep cell phones at least 6 inches from your device.  Do not carry the phone in a pocket near your device.  Security shaw: It is okay to walk through security shaw at the airports and department stores.  Tell airport security that you have a pacemaker.  They should keep the screening wand at least 6 inches from your device.  Full-body scanners are safe.    Avoid the following:  MRI tests in the hospital unless you have a MRI safe pacemaker.  Arc welding, chain saws and high-powered industrial or commercial tools.  Power lines, power plants and large power generators.  Electric body fat scales.  Magnetic mattress pads or pillow.    Questions?  Please call [facility].   Device Nurse:          Business Hours:  [phone]                       After Hours:  [phone]   Choose option 4, then ask for the                                                  on-call device nurse at job code [id number].    Your next device clinic appointment is scheduled on:     ___________________________ at _____________.   [facility]  [facility] [address]  [address] [place] [address]  [id number]

## 2023-10-30 NOTE — Clinical Note
Prepped: right chest. Prepped with: ChloraPrep. The patient was draped. .Pre-procedure site marking:N/A

## 2023-10-30 NOTE — PROGRESS NOTES
Patient seen on 2A for discharge teaching. Patient is s/p pacemaker gen change  on 10/30/23.  Discharge teaching provided in written and verbal form. Patient verbalized understanding of instructions. Plan for patient to have remote transmission using home monitor at nursing home on 11/6/23 and an incision check to be performed by RN at LTC facility.

## 2024-01-01 ENCOUNTER — ANCILLARY PROCEDURE (OUTPATIENT)
Dept: CARDIOLOGY | Facility: CLINIC | Age: 89
End: 2024-01-01
Attending: INTERNAL MEDICINE
Payer: MEDICARE

## 2024-01-01 DIAGNOSIS — I44.2 ATRIOVENTRICULAR BLOCK, COMPLETE (H): ICD-10-CM

## 2024-01-01 LAB
MDC_IDC_LEAD_CONNECTION_STATUS: NORMAL
MDC_IDC_LEAD_IMPLANT_DT: NORMAL
MDC_IDC_LEAD_LOCATION: NORMAL
MDC_IDC_LEAD_LOCATION_DETAIL_1: NORMAL
MDC_IDC_LEAD_MFG: NORMAL
MDC_IDC_LEAD_MODEL: NORMAL
MDC_IDC_LEAD_POLARITY_TYPE: NORMAL
MDC_IDC_LEAD_SERIAL: NORMAL
MDC_IDC_MSMT_BATTERY_DTM: NORMAL
MDC_IDC_MSMT_BATTERY_DTM: NORMAL
MDC_IDC_MSMT_BATTERY_REMAINING_LONGEVITY: 143 MO
MDC_IDC_MSMT_BATTERY_REMAINING_LONGEVITY: 2 MO
MDC_IDC_MSMT_BATTERY_RRT_TRIGGER: 2.62
MDC_IDC_MSMT_BATTERY_RRT_TRIGGER: 2.83
MDC_IDC_MSMT_BATTERY_STATUS: NORMAL
MDC_IDC_MSMT_BATTERY_STATUS: NORMAL
MDC_IDC_MSMT_BATTERY_VOLTAGE: 2.83 V
MDC_IDC_MSMT_BATTERY_VOLTAGE: 3.19 V
MDC_IDC_MSMT_LEADCHNL_RA_IMPEDANCE_VALUE: 399 OHM
MDC_IDC_MSMT_LEADCHNL_RA_IMPEDANCE_VALUE: 399 OHM
MDC_IDC_MSMT_LEADCHNL_RA_IMPEDANCE_VALUE: 418 OHM
MDC_IDC_MSMT_LEADCHNL_RA_IMPEDANCE_VALUE: 437 OHM
MDC_IDC_MSMT_LEADCHNL_RA_PACING_THRESHOLD_AMPLITUDE: 0.5 V
MDC_IDC_MSMT_LEADCHNL_RA_PACING_THRESHOLD_AMPLITUDE: 0.5 V
MDC_IDC_MSMT_LEADCHNL_RA_PACING_THRESHOLD_PULSEWIDTH: 0.4 MS
MDC_IDC_MSMT_LEADCHNL_RA_PACING_THRESHOLD_PULSEWIDTH: 0.4 MS
MDC_IDC_MSMT_LEADCHNL_RA_SENSING_INTR_AMPL: 1.12 MV
MDC_IDC_MSMT_LEADCHNL_RA_SENSING_INTR_AMPL: 1.12 MV
MDC_IDC_MSMT_LEADCHNL_RA_SENSING_INTR_AMPL: 1.62 MV
MDC_IDC_MSMT_LEADCHNL_RV_IMPEDANCE_VALUE: 399 OHM
MDC_IDC_MSMT_LEADCHNL_RV_IMPEDANCE_VALUE: 418 OHM
MDC_IDC_MSMT_LEADCHNL_RV_IMPEDANCE_VALUE: 456 OHM
MDC_IDC_MSMT_LEADCHNL_RV_IMPEDANCE_VALUE: 475 OHM
MDC_IDC_MSMT_LEADCHNL_RV_PACING_THRESHOLD_AMPLITUDE: 0.88 V
MDC_IDC_MSMT_LEADCHNL_RV_PACING_THRESHOLD_AMPLITUDE: 0.88 V
MDC_IDC_MSMT_LEADCHNL_RV_PACING_THRESHOLD_PULSEWIDTH: 0.4 MS
MDC_IDC_MSMT_LEADCHNL_RV_PACING_THRESHOLD_PULSEWIDTH: 0.4 MS
MDC_IDC_MSMT_LEADCHNL_RV_SENSING_INTR_AMPL: 31.62 MV
MDC_IDC_MSMT_LEADCHNL_RV_SENSING_INTR_AMPL: 31.62 MV
MDC_IDC_PG_IMPLANT_DTM: NORMAL
MDC_IDC_PG_IMPLANT_DTM: NORMAL
MDC_IDC_PG_MFG: NORMAL
MDC_IDC_PG_MFG: NORMAL
MDC_IDC_PG_MODEL: NORMAL
MDC_IDC_PG_MODEL: NORMAL
MDC_IDC_PG_SERIAL: NORMAL
MDC_IDC_PG_SERIAL: NORMAL
MDC_IDC_PG_TYPE: NORMAL
MDC_IDC_PG_TYPE: NORMAL
MDC_IDC_SESS_CLINIC_NAME: NORMAL
MDC_IDC_SESS_CLINIC_NAME: NORMAL
MDC_IDC_SESS_DTM: NORMAL
MDC_IDC_SESS_DTM: NORMAL
MDC_IDC_SESS_TYPE: NORMAL
MDC_IDC_SESS_TYPE: NORMAL
MDC_IDC_SET_BRADY_AT_MODE_SWITCH_RATE: 171 {BEATS}/MIN
MDC_IDC_SET_BRADY_AT_MODE_SWITCH_RATE: 171 {BEATS}/MIN
MDC_IDC_SET_BRADY_HYSTRATE: NORMAL
MDC_IDC_SET_BRADY_HYSTRATE: NORMAL
MDC_IDC_SET_BRADY_LOWRATE: 60 {BEATS}/MIN
MDC_IDC_SET_BRADY_LOWRATE: 60 {BEATS}/MIN
MDC_IDC_SET_BRADY_MAX_SENSOR_RATE: 120 {BEATS}/MIN
MDC_IDC_SET_BRADY_MAX_SENSOR_RATE: 120 {BEATS}/MIN
MDC_IDC_SET_BRADY_MAX_TRACKING_RATE: 120 {BEATS}/MIN
MDC_IDC_SET_BRADY_MAX_TRACKING_RATE: 120 {BEATS}/MIN
MDC_IDC_SET_BRADY_MODE: NORMAL
MDC_IDC_SET_BRADY_MODE: NORMAL
MDC_IDC_SET_BRADY_PAV_DELAY_LOW: 180 MS
MDC_IDC_SET_BRADY_PAV_DELAY_LOW: 180 MS
MDC_IDC_SET_BRADY_SAV_DELAY_LOW: 150 MS
MDC_IDC_SET_BRADY_SAV_DELAY_LOW: 150 MS
MDC_IDC_SET_LEADCHNL_RA_PACING_AMPLITUDE: 1.5 V
MDC_IDC_SET_LEADCHNL_RA_PACING_AMPLITUDE: 1.5 V
MDC_IDC_SET_LEADCHNL_RA_PACING_ANODE_ELECTRODE_1: NORMAL
MDC_IDC_SET_LEADCHNL_RA_PACING_ANODE_ELECTRODE_1: NORMAL
MDC_IDC_SET_LEADCHNL_RA_PACING_ANODE_LOCATION_1: NORMAL
MDC_IDC_SET_LEADCHNL_RA_PACING_ANODE_LOCATION_1: NORMAL
MDC_IDC_SET_LEADCHNL_RA_PACING_CAPTURE_MODE: NORMAL
MDC_IDC_SET_LEADCHNL_RA_PACING_CAPTURE_MODE: NORMAL
MDC_IDC_SET_LEADCHNL_RA_PACING_CATHODE_ELECTRODE_1: NORMAL
MDC_IDC_SET_LEADCHNL_RA_PACING_CATHODE_ELECTRODE_1: NORMAL
MDC_IDC_SET_LEADCHNL_RA_PACING_CATHODE_LOCATION_1: NORMAL
MDC_IDC_SET_LEADCHNL_RA_PACING_CATHODE_LOCATION_1: NORMAL
MDC_IDC_SET_LEADCHNL_RA_PACING_POLARITY: NORMAL
MDC_IDC_SET_LEADCHNL_RA_PACING_POLARITY: NORMAL
MDC_IDC_SET_LEADCHNL_RA_PACING_PULSEWIDTH: 0.4 MS
MDC_IDC_SET_LEADCHNL_RA_PACING_PULSEWIDTH: 0.4 MS
MDC_IDC_SET_LEADCHNL_RA_SENSING_ANODE_ELECTRODE_1: NORMAL
MDC_IDC_SET_LEADCHNL_RA_SENSING_ANODE_ELECTRODE_1: NORMAL
MDC_IDC_SET_LEADCHNL_RA_SENSING_ANODE_LOCATION_1: NORMAL
MDC_IDC_SET_LEADCHNL_RA_SENSING_ANODE_LOCATION_1: NORMAL
MDC_IDC_SET_LEADCHNL_RA_SENSING_CATHODE_ELECTRODE_1: NORMAL
MDC_IDC_SET_LEADCHNL_RA_SENSING_CATHODE_ELECTRODE_1: NORMAL
MDC_IDC_SET_LEADCHNL_RA_SENSING_CATHODE_LOCATION_1: NORMAL
MDC_IDC_SET_LEADCHNL_RA_SENSING_CATHODE_LOCATION_1: NORMAL
MDC_IDC_SET_LEADCHNL_RA_SENSING_POLARITY: NORMAL
MDC_IDC_SET_LEADCHNL_RA_SENSING_POLARITY: NORMAL
MDC_IDC_SET_LEADCHNL_RA_SENSING_SENSITIVITY: 0.3 MV
MDC_IDC_SET_LEADCHNL_RA_SENSING_SENSITIVITY: 0.3 MV
MDC_IDC_SET_LEADCHNL_RV_PACING_AMPLITUDE: 2 V
MDC_IDC_SET_LEADCHNL_RV_PACING_AMPLITUDE: 2 V
MDC_IDC_SET_LEADCHNL_RV_PACING_ANODE_ELECTRODE_1: NORMAL
MDC_IDC_SET_LEADCHNL_RV_PACING_ANODE_ELECTRODE_1: NORMAL
MDC_IDC_SET_LEADCHNL_RV_PACING_ANODE_LOCATION_1: NORMAL
MDC_IDC_SET_LEADCHNL_RV_PACING_ANODE_LOCATION_1: NORMAL
MDC_IDC_SET_LEADCHNL_RV_PACING_CAPTURE_MODE: NORMAL
MDC_IDC_SET_LEADCHNL_RV_PACING_CAPTURE_MODE: NORMAL
MDC_IDC_SET_LEADCHNL_RV_PACING_CATHODE_ELECTRODE_1: NORMAL
MDC_IDC_SET_LEADCHNL_RV_PACING_CATHODE_ELECTRODE_1: NORMAL
MDC_IDC_SET_LEADCHNL_RV_PACING_CATHODE_LOCATION_1: NORMAL
MDC_IDC_SET_LEADCHNL_RV_PACING_CATHODE_LOCATION_1: NORMAL
MDC_IDC_SET_LEADCHNL_RV_PACING_POLARITY: NORMAL
MDC_IDC_SET_LEADCHNL_RV_PACING_POLARITY: NORMAL
MDC_IDC_SET_LEADCHNL_RV_PACING_PULSEWIDTH: 0.4 MS
MDC_IDC_SET_LEADCHNL_RV_PACING_PULSEWIDTH: 0.4 MS
MDC_IDC_SET_LEADCHNL_RV_SENSING_ANODE_ELECTRODE_1: NORMAL
MDC_IDC_SET_LEADCHNL_RV_SENSING_ANODE_ELECTRODE_1: NORMAL
MDC_IDC_SET_LEADCHNL_RV_SENSING_ANODE_LOCATION_1: NORMAL
MDC_IDC_SET_LEADCHNL_RV_SENSING_ANODE_LOCATION_1: NORMAL
MDC_IDC_SET_LEADCHNL_RV_SENSING_CATHODE_ELECTRODE_1: NORMAL
MDC_IDC_SET_LEADCHNL_RV_SENSING_CATHODE_ELECTRODE_1: NORMAL
MDC_IDC_SET_LEADCHNL_RV_SENSING_CATHODE_LOCATION_1: NORMAL
MDC_IDC_SET_LEADCHNL_RV_SENSING_CATHODE_LOCATION_1: NORMAL
MDC_IDC_SET_LEADCHNL_RV_SENSING_POLARITY: NORMAL
MDC_IDC_SET_LEADCHNL_RV_SENSING_POLARITY: NORMAL
MDC_IDC_SET_LEADCHNL_RV_SENSING_SENSITIVITY: 0.9 MV
MDC_IDC_SET_LEADCHNL_RV_SENSING_SENSITIVITY: 0.9 MV
MDC_IDC_SET_ZONE_DETECTION_INTERVAL: 200 MS
MDC_IDC_SET_ZONE_DETECTION_INTERVAL: 200 MS
MDC_IDC_SET_ZONE_DETECTION_INTERVAL: 350 MS
MDC_IDC_SET_ZONE_DETECTION_INTERVAL: 350 MS
MDC_IDC_SET_ZONE_DETECTION_INTERVAL: 400 MS
MDC_IDC_SET_ZONE_DETECTION_INTERVAL: 400 MS
MDC_IDC_SET_ZONE_STATUS: NORMAL
MDC_IDC_SET_ZONE_TYPE: NORMAL
MDC_IDC_SET_ZONE_VENDOR_TYPE: NORMAL
MDC_IDC_STAT_AT_BURDEN_PERCENT: 0 %
MDC_IDC_STAT_AT_BURDEN_PERCENT: 0 %
MDC_IDC_STAT_AT_DTM_END: NORMAL
MDC_IDC_STAT_AT_DTM_END: NORMAL
MDC_IDC_STAT_AT_DTM_START: NORMAL
MDC_IDC_STAT_AT_DTM_START: NORMAL
MDC_IDC_STAT_BRADY_AP_VP_PERCENT: 37.59 %
MDC_IDC_STAT_BRADY_AP_VP_PERCENT: 51.24 %
MDC_IDC_STAT_BRADY_AP_VS_PERCENT: 0 %
MDC_IDC_STAT_BRADY_AP_VS_PERCENT: 0 %
MDC_IDC_STAT_BRADY_AS_VP_PERCENT: 48.75 %
MDC_IDC_STAT_BRADY_AS_VP_PERCENT: 62.36 %
MDC_IDC_STAT_BRADY_AS_VS_PERCENT: 0.01 %
MDC_IDC_STAT_BRADY_AS_VS_PERCENT: 0.05 %
MDC_IDC_STAT_BRADY_DTM_END: NORMAL
MDC_IDC_STAT_BRADY_DTM_END: NORMAL
MDC_IDC_STAT_BRADY_DTM_START: NORMAL
MDC_IDC_STAT_BRADY_DTM_START: NORMAL
MDC_IDC_STAT_BRADY_RA_PERCENT_PACED: 37.57 %
MDC_IDC_STAT_BRADY_RA_PERCENT_PACED: 51.16 %
MDC_IDC_STAT_BRADY_RV_PERCENT_PACED: 99.94 %
MDC_IDC_STAT_BRADY_RV_PERCENT_PACED: 99.99 %
MDC_IDC_STAT_EPISODE_RECENT_COUNT: 0
MDC_IDC_STAT_EPISODE_RECENT_COUNT_DTM_END: NORMAL
MDC_IDC_STAT_EPISODE_RECENT_COUNT_DTM_START: NORMAL
MDC_IDC_STAT_EPISODE_TOTAL_COUNT: 0
MDC_IDC_STAT_EPISODE_TOTAL_COUNT: 1
MDC_IDC_STAT_EPISODE_TOTAL_COUNT: 3131
MDC_IDC_STAT_EPISODE_TOTAL_COUNT_DTM_END: NORMAL
MDC_IDC_STAT_EPISODE_TOTAL_COUNT_DTM_START: NORMAL
MDC_IDC_STAT_EPISODE_TYPE: NORMAL
MDC_IDC_STAT_TACHYTHERAPY_RECENT_DTM_END: NORMAL
MDC_IDC_STAT_TACHYTHERAPY_RECENT_DTM_END: NORMAL
MDC_IDC_STAT_TACHYTHERAPY_RECENT_DTM_START: NORMAL
MDC_IDC_STAT_TACHYTHERAPY_RECENT_DTM_START: NORMAL
MDC_IDC_STAT_TACHYTHERAPY_TOTAL_DTM_END: NORMAL
MDC_IDC_STAT_TACHYTHERAPY_TOTAL_DTM_END: NORMAL
MDC_IDC_STAT_TACHYTHERAPY_TOTAL_DTM_START: NORMAL
MDC_IDC_STAT_TACHYTHERAPY_TOTAL_DTM_START: NORMAL

## 2024-01-01 PROCEDURE — 93296 REM INTERROG EVL PM/IDS: CPT

## 2024-01-01 PROCEDURE — 93294 REM INTERROG EVL PM/LDLS PM: CPT | Performed by: INTERNAL MEDICINE

## (undated) DEVICE — CABLE PACING ALLIGATOR CLIP 12FT 5833SL

## (undated) DEVICE — ELECTRODE DEFIB CADENCE 22550R

## (undated) DEVICE — Device

## (undated) DEVICE — KIT WRENCH 5873W

## (undated) RX ORDER — CEFAZOLIN SODIUM 2 G/100ML
INJECTION, SOLUTION INTRAVENOUS
Status: DISPENSED
Start: 2023-01-01

## (undated) RX ORDER — SODIUM CHLORIDE 9 MG/ML
INJECTION, SOLUTION INTRAVENOUS
Status: DISPENSED
Start: 2023-01-01